# Patient Record
Sex: MALE | Race: WHITE | Employment: OTHER | URBAN - METROPOLITAN AREA
[De-identification: names, ages, dates, MRNs, and addresses within clinical notes are randomized per-mention and may not be internally consistent; named-entity substitution may affect disease eponyms.]

---

## 2017-03-29 ENCOUNTER — OFFICE VISIT (OUTPATIENT)
Dept: HEMATOLOGY | Age: 57
End: 2017-03-29

## 2017-03-29 VITALS
HEART RATE: 79 BPM | HEIGHT: 77 IN | BODY MASS INDEX: 37.19 KG/M2 | TEMPERATURE: 99.5 F | SYSTOLIC BLOOD PRESSURE: 147 MMHG | RESPIRATION RATE: 18 BRPM | WEIGHT: 315 LBS | OXYGEN SATURATION: 99 % | DIASTOLIC BLOOD PRESSURE: 83 MMHG

## 2017-03-29 DIAGNOSIS — K75.81 NASH (NONALCOHOLIC STEATOHEPATITIS): Primary | ICD-10-CM

## 2017-03-29 NOTE — PROGRESS NOTES
93 Cecily Altman MD, KAMI Israel PA-C Wanita Parisian, MD, MD Le Sen NP Roanne Bar, NP        [address], [address] [facility], [address]     [phone]     FAX: 651 [facility], [address] [place] [address], [address] [facility] [address]     [phone]     FAX: [phone]       Patient Care Team:  Liv Lopez MD as PCP - General (Internal Medicine)  Grecia Winslow MD (General Surgery)      Problem List  Date Reviewed: 12/31/2016          Codes Class Noted    H/O laparoscopic adjustable gastric banding ICD-10-CM: Z98.84  ICD-9-CM: V45.86  8/10/2016        H/O lumbosacral spine surgery ICD-10-CM: Z98.890  ICD-9-CM: V15.29  8/10/2016        S/P appendectomy ICD-10-CM: Z90.49  ICD-9-CM: V45.89  8/10/2016        SHAW (nonalcoholic steatohepatitis) ICD-10-CM: K75.81  ICD-9-CM: 571.8  11/7/2013        History of atrial fibrillation ICD-10-CM: Z86.79  ICD-9-CM: V12.59  Unknown    Overview Signed 8/5/2013  9:52 AM by NORMAN Cobian     cardioverted / no recurrance             Depression ICD-10-CM: F32.9  ICD-9-CM: 311  Unknown        Atrial fibrillation ([facility] ICD-10-CM: I48.91  ICD-9-CM: 427.31  11/19/2012    Overview Signed 8/10/2016 12:06 PM by Shaunna Wright MD     In [address],C with medication             Morbid obesity ([facility] ICD-10-CM: E66.01  ICD-9-CM: 278.01  11/19/2012        High triglycerides ICD-10-CM: E78.1  ICD-9-CM: 272.1  8/1/2012        Sleep apnea ICD-10-CM: G47.30  ICD-9-CM: 780.57  8/1/2012    Overview Signed 8/1/2012 11:21 AM by Nandini Benavides     c-pap machine               Chronic back pain ICD-10-CM: M54.9, G89.29  ICD-9-CM: 724.5, 338.29  8/1/2012        Hypertension ICD-10-CM: Z31  ICD-9-CM: 401.9  Unknown              Romario Mckinley returns to the [facility] for management of fatty liver. The active problem list, all pertinent past medical history, medications,   liver histology, radiologic findings and laboratory findings related to the liver disorder were reviewed with the patient. The patient is a 56 y.o.  male who was found to have fatty liver disease on liver biopsy at the time of gastric banding in 10/2013. The baseline weight was 360 pounds and this declined to 320 pounds. He then gained to his current weight. Serologic evaluation for causes of chronic liver disease was negative. Imaging of the liver was ordered at the last office visit but e never scheduled this. A liver biopsy was performed in 10/2013 at the time of obesity surgery. This demonstrated SHAW with bridging fibrosis. The most recent laboratory studies indicate that the liver transaminases are elevated, ALP is normal, tests of hepatic synthetic and metabolic function are normal, and the platelet count is normal.    The patient has no symptoms which could be attributed to the liver disorder. The patient completes all daily activities without any functional limitations. The patient has not experienced fatigue, pain in the right side over the liver, problems concentrating, swelling of the abdomen, swelling of the lower extremities, hematemesis, hematochezia. ALLERGIES  No Known Allergies    MEDICATIONS  Current Outpatient Prescriptions   Medication Sig    METFORMIN HCL (METFORMIN PO) Take  by mouth.  TESTOSTERONE (FORTESTA TD) by TransDERmal route.  DOFETILIDE (TIKOSYN PO) Take  by mouth.  multivitamin (ONE A DAY) tablet Take 1 Tab by mouth daily.  cetirizine (ZYRTEC) 10 mg tablet Take  by mouth daily.  buPROPion SR (WELLBUTRIN SR) 150 mg SR tablet Take 300 mg by mouth two (2) times a day. 300 XR    zolpidem (AMBIEN) 10 mg tablet Take 10 mg by mouth nightly as needed.     venlafaxine 225 mg TR24 Take 225 mg by mouth daily. No current facility-administered medications for this visit. SYSTEM REVIEW NOT RELATED TO LIVER DISEASE OR REVIEWED ABOVE:  Constitution systems: Negative for fever, chills, weight gain, weight loss. Eyes: Negative for visual changes. ENT: Negative for sore throat, painful swallowing. Respiratory: Negative for cough, hemoptysis, SOB. Cardiology: Negative for chest pain, palpitations. GI:  Negative for constipation or diarrhea. : Negative for urinary frequency, dysuria, hematuria, nocturia. Skin: Negative for rash. Hematology: Negative for easy bruising, blood clots. Musculo-skelatal: Negative for back pain, muscle pain, weakness. Neurologic: Negative for headaches, dizziness, vertigo, memory problems not related to HE. Psychology: Negative for anxiety, depression. FAMILY HISTORY:  The father  of MI. The mother is alive and healthy. There is no family history of liver disease. SOCIAL HISTORY:  The patient is . The patient has 3 children,   The patient has never used tobacco products. The patient had consumed 5-6 alcoholic beverages per day. Sicne my initial appointment with me in 2016 he now only consumes 0-2 alcoholic beverages per day. The patient used to work as a . The patient retired in . PHYSICAL EXAMINATION:  Visit Vitals    /83 (BP 1 Location: Left arm, BP Patient Position: Sitting)    Pulse 79    Temp 99.5 °F (37.5 °C) (Tympanic)    Resp 18    Ht 6' 5\" (1.956 m)    Wt 331 lb 3.2 oz (150.2 kg)    SpO2 99%    BMI 39.27 kg/m2     General: No acute distress. Eyes: Sclera anicteric. ENT: No oral lesions. Thyroid normal.  Nodes: No adenopathy. Skin: No spider angiomata. No jaundice. No palmar erythema. Respiratory: Lungs clear to auscultation. Cardiovascular: Regular heart rate. No murmurs. No JVD. Abdomen: Soft non-tender. Liver size normal to percussion/palpation.   Spleen not palpable. No obvious ascites. Extremities: No edema. No muscle wasting. No gross arthritic changes. Neurologic: Alert and oriented. Cranial nerves grossly intact. No asterixis. LABORATORY STUDIES:  [facility] Ref Rng 8/10/2016 9/30/2013   WBC 4.6 - 13.2 K/uL 6.0 5.5   ANC 1.8 - 8.0 K/UL 2.6 2.2   HGB 13.0 - 16.0 g/dL 13.6 16.6 (H)    - 420 K/uL 241 173   INR 0.8 - 1.2   1.2    AST 15 - 37 U/L 91 (H) 96 (H)   ALT 16 - 61 U/L 170 (H) 186 (H)   Alk Phos 45 - 117 U/L 69 92   Bili, Total 0.2 - 1.0 MG/DL 1.0 1.3 (H)   Bili, Direct 0.0 - 0.2 MG/DL 0.2    Albumin 3.4 - 5.0 g/dL 4.1 3.8   BUN 7.0 - 18 MG/DL 11 14   Creat 0.6 - 1.3 MG/DL 1.07 1.12   Na 136 - 145 mmol/L 140 141   K 3.5 - 5.5 mmol/L 4.1 4.1   Cl 100 - 108 mmol/L 103 105   CO2 21 - 32 mmol/L 28 28   Glucose 74 - 99 mg/dL 97 108 (H)     SEROLOGIES:  Serologies Latest Ref Rng 8/10/2016   Hep A Ab, Total NEGATIVE   NEGATIVE   Hep B Surface Ag <1.00 Index <0.10   Hep B Surface Ag Interp NEG   NEGATIVE   Hep B Core Ab, Total NEGATIVE   NEGATIVE   Hep B Surface Ab >10.0 mIU/mL 223.44   Hep B Surface Ab Interp POS   POSITIVE   Hep C Ab 0.0 - 0.9 s/co ratio <0.1   Ferritin 8 - 388 NG/ML 17   Iron % Saturation % 8   C-ANCA Neg:<1:20 titer <1:20   P-ANCA Neg:<1:20 titer <1:20   ANCA Neg:<1:20 titer <1:20   ASMCA 0 - 19 Units 15   M2 Ab 0.0 - 20.0 Units 9.0   Alpha-1 antitrypsin level 90 - 200 mg/dL 140     LIVER HISTOLOGY:  10/2013. SHAW. 40% macro and micovesicular steatosis. Moderate ballooning. Moderate inflammation. Bridging fibrosis. Biopsy slides not reviewed by MLS. ENDOSCOPIC PROCEDURES:  Not available or performed    RADIOLOGY:  Not available or performed    OTHER TESTING:  Not available or performed    ASSESSMENT AND PLAN:  Steatohepatitis with bridging fibrosis by liver biopsy in 2013 at the time of obesity surgery.       This may have been caused by a combination of alcohol and non-alcoholic fatty liver at that time.  It is uncler how much alcohol he was consuming at the time of the biopsy which was 3 years before I saw him. When I did see him initially last year he was consuming too much alcohol. Since then he has reduced alcohol to a \"normal\" amount. He has not consumed excessive amounts of alcohol for more than 6 months. Some weight loss following the adjustable gastric banding procedure in 2013. The weight has since increased. Liver transaminases are elevated. Alkaline phosphate is normal.  Liver function is normal.  The platelet count is normal.      Will perform imaging of the liver with ultrasound. The need to perform an assessment of liver fibrosis was discussed with the patient. The Fibroscan can assess liver fibrosis and determine if a patient has advanced fibrosis or cirrhosis without the need for liver biopsy. The Fibroscan is currently available at liver [place]. This will be scheduled. Will retrieve the liver biopsy slides from pathology for my personal review. The patient remains severely obese despite the adjustable gastric banding. His weight has increased and since remained stable the brittany a few months after the procedure which was now 3 yeas ago. The patient was counseled regarding diet and exercise to achieve weight loss. The best diet for patients with fatty liver is one very low in carbohydrates and enriched with protein such as an Atkins program.      The patient may be able to be a candidate for enrollment in a clinical trial for treatment of SHAW despite the gastric banding. The patient was counseled regarding alcohol consumption and that this could contribute to fatty liver disease. Vaccination for viral hepatitis A is recommended since the patient has no serologic evidence of previous exposure or vaccination with immunity. Vaccination for viral hepatitis B is not needed.   The patient has serologic evidence of prior exposure or vaccination with immunity. All of the above issues were discussed with the patient. All questions were answered. The patient expressed a clear understanding of the above. [facility] in 4 months. We will contact the patient soner if he is able to enroll into a clinical trial for treatment of SHAW.     Diogenes Pedraza MD  [facility] [place] [address], [address] [place] [address] [facility], [address] [place] [address] Street - [address]  [phone]

## 2017-03-29 NOTE — MR AVS SNAPSHOT
Visit Information Date & Time Provider Department Dept. Phone Encounter #  
 3/29/2017 11:00 AM Ramón Tavarez MD [facility] [address] [id number] Upcoming Health Maintenance Date Due DTaP/Tdap/Td series (1 - Tdap) 2/21/1983 FOBT Q 1 YEAR AGE 50-75 2/21/2012 INFLUENZA AGE 9 TO ADULT 8/1/2016 Allergies as of 3/29/2017  Review Complete On: 3/29/2017 By: Hernandez Yarbrough No Known Allergies Current Immunizations  Never Reviewed No immunizations on file. Not reviewed this visit Vitals BP Pulse Temp Resp Height(growth percentile) 147/83 (BP 1 Location: Left arm, BP Patient Position: Sitting) 79 99.5 °F (37.5 °C) (Tympanic) 18 6' 5\" (1.956 m) Weight(growth percentile) SpO2 BMI Smoking Status 331 lb 3.2 oz (150.2 kg) 99% 39.27 kg/m2 Never Smoker BMI and BSA Data Body Mass Index Body Surface Area  
 39.27 kg/m 2 2.86 m 2 Preferred Pharmacy Pharmacy Name Phone [facility] [address] [place] [phone] Your Updated Medication List  
  
   
This list is accurate as of: 3/29/17 11:51 AM.  Always use your most recent med list.  
  
  
  
  
 AMBIEN 10 mg tablet Generic drug:  zolpidem Take 10 mg by mouth nightly as needed. buPROPion  mg SR tablet Commonly known as:  Maria Elena Cera Take 300 mg by mouth two (2) times a day. 300 XR FORTESTA TD  
by TransDERmal route. METFORMIN PO Take  by mouth.  
  
 multivitamin tablet Commonly known as:  ONE A DAY Take 1 Tab by mouth daily. TIKOSYN PO Take  by mouth. venlafaxine 225 mg Tr24 Take 225 mg by mouth daily. ZyrTEC 10 mg tablet Generic drug:  cetirizine Take  by mouth daily. Introducing [facility]!    
 Awilda Moore introduces [employer] patient portal. Now you can access parts of your medical record, email your doctor's office, and request medication refills online. 1. In your internet browser, go to [email or web address] 2. Click on the First Time User? Click Here link in the Sign In box. You will see the New Member Sign Up page. 3. Enter your "[employer] Access Code exactly as it appears below. You will not need to use this code after youve completed the sign-up process. If you do not sign up before the expiration date, you must request a new code. · "[employer] Access Code: [email or web address] Expires: 6/27/2017 11:51 AM 
 
4. Enter the last four digits of your Social Security Number (xxxx) and Date of Birth (mm/dd/yyyy) as indicated and click Submit. You will be taken to the next sign-up page. 5. Create a "[employer] ID. This will be your "[employer] login ID and cannot be changed, so think of one that is secure and easy to remember. 6. Create a "[employer] password. You can change your password at any time. 7. Enter your Password Reset Question and Answer. This can be used at a later time if you forget your password. 8. Enter your e-mail address. You will receive e-mail notification when new information is available in [address] 9. Click Sign Up. You can now view and download portions of your medical record. 10. Click the Download Summary menu link to download a portable copy of your medical information. If you have questions, please visit the Frequently Asked Questions section of the "[employer] website. Remember, "[employer] is NOT to be used for urgent needs. For medical emergencies, dial 911. Now available from your iPhone and Android! Please provide this summary of care documentation to your next provider. Your primary care clinician is listed as [address] [place] [address] Street. If you have any questions after today's visit, please call [phone].

## 2017-06-07 ENCOUNTER — APPOINTMENT (OUTPATIENT)
Dept: GENERAL RADIOLOGY | Age: 57
End: 2017-06-07
Attending: SPECIALIST
Payer: OTHER GOVERNMENT

## 2017-06-07 ENCOUNTER — HOSPITAL ENCOUNTER (OUTPATIENT)
Age: 57
Setting detail: OUTPATIENT SURGERY
Discharge: HOME | End: 2017-06-07
Attending: SPECIALIST | Admitting: SPECIALIST
Payer: OTHER GOVERNMENT

## 2017-06-07 VITALS
BODY MASS INDEX: 37.19 KG/M2 | HEIGHT: 77 IN | DIASTOLIC BLOOD PRESSURE: 92 MMHG | WEIGHT: 315 LBS | TEMPERATURE: 98.6 F | OXYGEN SATURATION: 96 % | RESPIRATION RATE: 18 BRPM | SYSTOLIC BLOOD PRESSURE: 154 MMHG | HEART RATE: 71 BPM

## 2017-06-07 DIAGNOSIS — Z46.51 FITTING AND ADJUSTMENT OF GASTRIC LAP BAND: ICD-10-CM

## 2017-06-07 PROCEDURE — 74011000255 HC RX REV CODE- 255: Performed by: SPECIALIST

## 2017-06-07 PROCEDURE — 76000 FLUOROSCOPY <1 HR PHYS/QHP: CPT

## 2017-06-07 PROCEDURE — 74011000250 HC RX REV CODE- 250: Performed by: SPECIALIST

## 2017-06-07 PROCEDURE — 43999 UNLISTED PROCEDURE STOMACH: CPT | Performed by: SPECIALIST

## 2017-06-07 RX ORDER — LIDOCAINE HYDROCHLORIDE 10 MG/ML
INJECTION INFILTRATION; PERINEURAL AS NEEDED
Status: DISCONTINUED | OUTPATIENT
Start: 2017-06-07 | End: 2017-06-07 | Stop reason: HOSPADM

## 2017-06-07 NOTE — IP AVS SNAPSHOT
Tessa Wilson 
 
 
 [address] [address] 
[phone] Patient: Aracelis De Leon MRN: [id number] WYO:0[birth year]86[birth year]93 You are allergic to the following No active allergies Recent Documentation Height Weight BMI Smoking Status 1.956 m 154 kg 40.26 kg/m2 Never Smoker Emergency Contacts Name Discharge Info Relation Home Work Mobile Neftaly Carrasco  Spouse [3] [phone] Amanda Carrasco  Spouse [3] [phone] About your hospitalization You were admitted on:  June 7, 2017 You last received care in the:  [facility] ENDOSCOPY You were discharged on:  June 7, 2017 Unit phone number:  [phone] Why you were hospitalized Your primary diagnosis was:  Not on File Providers Seen During Your Hospitalizations Provider Role Specialty Primary office phone Flori Dallas MD Attending Provider General Surgery [phone] Your Primary Care Physician (PCP) Primary Care Physician Office Phone Office Fax Isamar Moffett [phone] Follow-up Information None Your Appointments Wednesday June 07, 2017 GASTRIC BAND ADJUSTMENT with Flori Dallas MD  
[facility] ENDOSCOPY [facility] [place]) [address] [address]  
[phone] Current Discharge Medication List  
  
ASK your doctor about these medications Dose & Instructions Dispensing Information Comments Morning Noon Evening Bedtime AMBIEN 10 mg tablet Generic drug:  zolpidem Your last dose was: Your next dose is:    
   
   
 Dose:  10 mg Take 10 mg by mouth nightly as needed. Refills:  0  
     
   
   
   
  
 buPROPion  mg SR tablet Commonly known as:  Earma Landau Your last dose was:     
   
Your next dose is:    
   
   
 Dose:  300 mg  
 Take 300 mg by mouth two (2) times a day. 300 XR Refills:  0 FORTESTA TD Your last dose was: Your next dose is:    
   
   
 by TransDERmal route. Refills:  0 METFORMIN PO Your last dose was: Your next dose is: Take  by mouth. Refills:  0  
     
   
   
   
  
 multivitamin tablet Commonly known as:  ONE A DAY Your last dose was: Your next dose is:    
   
   
 Dose:  1 Tab Take 1 Tab by mouth daily. Refills:  0 TIKOSYN PO Your last dose was: Your next dose is: Take  by mouth. Refills:  0  
     
   
   
   
  
 venlafaxine 225 mg Tr24 Your last dose was: Your next dose is:    
   
   
 Dose:  225 mg Take 225 mg by mouth daily. Refills:  0 ZyrTEC 10 mg tablet Generic drug:  cetirizine Your last dose was: Your next dose is: Take  by mouth daily. Refills:  0 Discharge Instructions Verbal and written post adjustment / UGI instructions given. Patient acknowledges understanding. Discussed diet, activities, and s/s that should be reported. Encouraged to call to schedule next appointment and to call with any questions or concerns. Discharge Orders None Introducing [facility]! Catalina Giraldo introduces [employer] patient portal. Now you can access parts of your medical record, email your doctor's office, and request medication refills online. 1. In your internet browser, go to [email or web address] 2. Click on the First Time User? Click Here link in the Sign In box. You will see the New Member Sign Up page. 3. Enter your [employer] Access Code exactly as it appears below. You will not need to use this code after youve completed the sign-up process.  If you do not sign up before the expiration date, you must request a new code. · [employer] Access Code: [email or web address] Expires: 6/27/2017 11:51 AM 
 
4. Enter the last four digits of your Social Security Number (xxxx) and Date of Birth (mm/dd/yyyy) as indicated and click Submit. You will be taken to the next sign-up page. 5. Create a [employer] ID. This will be your [employer] login ID and cannot be changed, so think of one that is secure and easy to remember. 6. Create a [employer] password. You can change your password at any time. 7. Enter your Password Reset Question and Answer. This can be used at a later time if you forget your password. 8. Enter your e-mail address. You will receive e-mail notification when new information is available in [address] 9. Click Sign Up. You can now view and download portions of your medical record. 10. Click the Download Summary menu link to download a portable copy of your medical information. If you have questions, please visit the Frequently Asked Questions section of the [employer] website. Remember, [employer] is NOT to be used for urgent needs. For medical emergencies, dial 911. Now available from your iPhone and Android! General Information Please provide this summary of care documentation to your next provider. Patient Signature:  ____________________________________________________________ Date:  ____________________________________________________________  
  
Nadine Pedraza Provider Signature:  ____________________________________________________________ Date:  ____________________________________________________________

## 2017-06-07 NOTE — IP AVS SNAPSHOT
Current Discharge Medication List  
  
ASK your doctor about these medications Dose & Instructions Dispensing Information Comments Morning Noon Evening Bedtime AMBIEN 10 mg tablet Generic drug:  zolpidem Your last dose was: Your next dose is:    
   
   
 Dose:  10 mg Take 10 mg by mouth nightly as needed. Refills:  0  
     
   
   
   
  
 buPROPion  mg SR tablet Commonly known as:   Asp Your last dose was: Your next dose is:    
   
   
 Dose:  300 mg Take 300 mg by mouth two (2) times a day. 300 XR Refills:  0 FORTESTA TD Your last dose was: Your next dose is:    
   
   
 by TransDERmal route. Refills:  0 METFORMIN PO Your last dose was: Your next dose is: Take  by mouth. Refills:  0  
     
   
   
   
  
 multivitamin tablet Commonly known as:  ONE A DAY Your last dose was: Your next dose is:    
   
   
 Dose:  1 Tab Take 1 Tab by mouth daily. Refills:  0 TIKOSYN PO Your last dose was: Your next dose is: Take  by mouth. Refills:  0  
     
   
   
   
  
 venlafaxine 225 mg Tr24 Your last dose was: Your next dose is:    
   
   
 Dose:  225 mg Take 225 mg by mouth daily. Refills:  0 ZyrTEC 10 mg tablet Generic drug:  cetirizine Your last dose was: Your next dose is: Take  by mouth daily. Refills:  0

## 2017-06-07 NOTE — PROCEDURES
Lap Band Encounter (fluroscopy clinic)    Blair Franco is gastric banding patient who had his procedure on 09/30/13. his weight today is 154 kg (339 lb 8 oz), which correlates to  % EBW loss. he is here today for Lap Band Adjustment / Fill with Fluoroscopy Guidance. he notes the following issues related to the banding procedure; -  Here lost to F/U X 3.25 years with dysphagia and reflux issues.       Surgery related complications; NA    Visit Vitals    BP (!) 154/92    Pulse 71    Temp 98.6 °F (37 °C)    Resp 18    Ht 6' 5\" (1.956 m)    Wt 154 kg (339 lb 8 oz)    SpO2 96%    BMI 40.26 kg/m2       Past Medical History:   Diagnosis Date    Arrhythmia     h/o afib    Depression     Essential hypertension, benign 8/1/2012    High triglycerides 8/1/2012    History of atrial fibrillation     cardioverted / no recurrance    Hypercholesterolemia     PT denies this at triage 11/19/12    Hypertension 2012    Morbid [facility] ([facility]     Nausea & vomiting     Other ill-defined conditions(799.89)     concussions during football games through [employer]    Other ill-defined conditions(799.89)     numbness down right leg    Psychiatric disorder     depression/anxiety    Sleep apnea     instructed to bring CPAP day of Surgery; rate 12     Past Surgical History:   Procedure Laterality Date    CARDIAC CATHETERIZATION  1/2013    HX APPENDECTOMY      HX HEENT      UPP    HX ORTHOPAEDIC      L1-S5 diskectomy    LAP, PLACE ADJUST GASTR BAND       Current Facility-Administered Medications   Medication Dose Route Frequency Provider Last Rate Last Dose    barium sulfate (EZ PAQUE) 96% (w/w) contrast suspension    PRN Linda Reyna MD   30 mL at 06/07/17 1351    lidocaine (XYLOCAINE) 10 mg/mL (1 %) injection    PRMAMTA Reyna MD   1.5 mL at 06/07/17 1351          Review of Symptoms:     General - No history or complaints of unexpected fever or chills  Cardiac - No history or complaints of chest pain, palpitations, or shortness of breath  Pulmonary - No history or complaints of shortness of breath or productive cough  Gastrointestinal - as noted above        Physical Exam:    General:  alert, cooperative, no distress, appears stated age   Abdomen:   abdomen is soft without significant tenderness, masses, organomegaly or guarding; port in place   Incisions: healing well, no significant drainage       Assessment:     1. History of Morbid obesity, status post gastric banding, given the fluro findings of esophageal dilation and moderate / severe reflux we will proceed with the following adjustment.       Plan:     Previous Fill Volume: 8.7 ml   Removed: 1 ml    Total fill volume after today's adjustment: 7.7  Added:           plan for possible band to sleeve conversion due to reflux       Follow-up in PRN for possible conversion to sleeve

## 2017-06-26 ENCOUNTER — CLINICAL SUPPORT (OUTPATIENT)
Dept: SURGERY | Age: 57
End: 2017-06-26

## 2017-06-26 VITALS — WEIGHT: 315 LBS | HEIGHT: 77 IN | BODY MASS INDEX: 37.19 KG/M2

## 2017-06-26 DIAGNOSIS — E66.01 MORBID OBESITY WITH BMI OF 40.0-44.9, ADULT (HCC): Primary | ICD-10-CM

## 2017-06-26 NOTE — PROGRESS NOTES
Medical Weight Loss Multi-Disciplinary Program    Name: Cyril Bryant   : 1960    Session# 1  Date: 2017     Height: 6' 5\" (195.6 cm)    Weight: 156.5 kg (345 lb) lbs. Body mass index is 40.91 kg/(m^2). Dietary Instructions    Reviewed intake  Understanding low carbohydrates, low sugar, higher protein meals  Understanding proper portions  Instruction given for personal dietary changes  Comments: Pt given brief pre/post-op diet ed and diet hx reviewed. Physical Activity/Exercise    Discussed Perceived Compliance  Reasonable Goals Set  Motivation  Comments: Pt is active in daily life but has no formal routine. Goal to walk daily in the morning- about 7 am- for about 30 minutes starting out- in his neighborhood. Do more yardwork. Behavior Modification    Positive attitude  Comments: Pt is working on the following goals:    1. Walk daily in the morning- about 7 am- for about 30 minutes starting out- in his neighborhood. Do more yardwork. 2. Stop buying less healthy food that you like. Stock up on healthy protein snacks- use shopping list to help with this. 3. Continue all vitamins prior to surgery. Stop krill oil 10 days prior and resume 1 month after. Candidate for surgery (per RD): pending    Dietitian: Klaudia Rodríguez RD     Cyril Bryant is a 56 y.o. male who present for a pre-op evaluation.     Visit Vitals    Ht 6' 5\" (1.956 m)    Wt 156.5 kg (345 lb)    BMI 40.91 kg/m2     Past Medical History:   Diagnosis Date    Arrhythmia     h/o afib    Depression     Essential hypertension, benign 2012    High triglycerides 2012    History of atrial fibrillation     cardioverted / no recurrance    Hypercholesterolemia     PT denies this at triage 12    Hypertension 2012    Morbid obesity ([facility] [address]     Nausea & vomiting     Other ill-defined conditions(799.89)     concussions during football games through college    Other ill-defined conditions(799.89) numbness down right leg    Psychiatric disorder     depression/anxiety    Sleep apnea     instructed to bring CPAP day of Surgery; rate 12      Procedure:  Revision gastric band to gastric sleeve procedure      Reasons for Surgery:  BMI > 40 with one or more medically significant comorbidities    Summary:  Pt given brief pre/post-op diet ed and diet hx reviewed. Pt set several goals. See below. Current Vitamins: krill oil, multivitamin, cinnamon, chromium, Vitamin D, electrolyte supplement including potassium     What have you done in the past to try to lose weight? Tons of exercise, diet- low carbohydrate, decreased calories, hypnosis, band    Why didn't you lose weight or keep the weight off?: Could not keep up the changes, hit a plateau with the band     Why do you think having weight loss surgery will make it possible for you to lose weight and keep it off? Because of the metabolic change with sleeve, hunger not changed with the band,       Patient Education and Materials Provided:  Supplement [facility], B Vitamin Information, MVI Recommendations, Calcium Citrate Information, Bariatric Supplement Companies, Protein Supplement Information, Fluid Requirements, No Caffeine or Carbonation, No Alcohol for One Year Post Op, 3 Balanced Meals a Day, Food Group Guide, Exercising and Addressed Current Habits / Changes to make    Nutritional Hx: What is the number of meals you eat per day? 3    Do you eat between meals / snack? yes  Typical snack: baked goods, salami, cheese, nuts, peanuts, cashews, no chips, pork rinds     How fast do you eat your meals? Slowly than used too, but still eats quick     How many sodas/sugared beverages do you drink per day? none    How many caffeinated drinks do you have per day? Tea for a while- but none now because does not like diet tea     How much water do you drink per day? 64 ounces+     How often do you eat fast food?  Chic meena a daily- scrambled eggs, sausage, and cheese How often do you consume alcohol? 2-3 drinks per day     Diet History:  Breakfast  What are you eating and how much? [place] turnover- at 6:30 am, Cheese and mushroom omelette, 3 slices of juares   When? 10 am (woke up at 6 am)   Where? iii   Snacks  What are you eating and how much? i   When? ii   Where? iii   Hydration  What are you eating and how much? i   When? ii   Where? ii   Lunch  What are you eating and how much? Snacks throughout the afternoon- peanuts, pork rinds, a couple baked goods like strudle strips at [employer]    When? ii   Where? iii   Snacks  What are you eating and how much? i   When? ii   Where? iii   Hydration  What are you eating and how much? Brooks water and diet Pepsi    When? ii   Where? iii   Dinner  What are you eating and how much? 6 oz of smoked tenderloin and a cup and a half of juares, green, beans, and onions    When? 6 pm   Where? iii   Snacks  What are you eating and how much? Macadamia white chocolate cookies- large cookie   When? 8 pm   Where? iii   Hydration  What are you eating and how much? Rum and diet coke    When? ii   Where? iii     Exercise:  Do you currently have an exercise routine? no, active in daily life but no routine     Goals:   1. Walk daily in the morning- about 7 am- for about 30 minutes starting out- in his neighborhood. Do more yardwork. 2. Stop buying less healthy food that you like. Stock up on healthy protein snacks- use shopping list to help with this. 3. Continue all vitamins prior to surgery. Stop krill oil 10 days prior and resume 1 month after.

## 2017-06-26 NOTE — LETTER
NOTIFICATION OF RETURN TO WORK / SCHOOL 
 
6/26/2017 Mr. Saloni Garza [place] [address] [address] [facility] [address] To Whom It May Concern: 
 
Saloni Garza was seen in our office today 06/26/2017. He will return to {work/school:20096} on 06/27/2017 with no restrictions. Patient also has a future apt schedule for 07/26/2017 at 1:00PM. If there are questions or concerns please contact our office.  
 
 
 
Sincerely, 
 
 
TSS NUTRI VISIT PEN

## 2017-06-26 NOTE — PATIENT INSTRUCTIONS
Goals: 1. Walk daily in the morning- about 7 am- for about 30 minutes starting out- in his neighborhood. Do more yardwork. 2. Stop buying less healthy food that you like. Stock up on healthy protein snacks- use shopping list to help with this. 3. Continue all vitamins prior to surgery. Stop krill oil 10 days prior and resume 1 month after.

## 2017-06-26 NOTE — MR AVS SNAPSHOT
Visit Information Date & Time Provider Department Dept. Phone Encounter #  
 6/26/2017  1:00 PM [facility] Lamonte Leyva [phone] [id number] Upcoming Health Maintenance Date Due DTaP/Tdap/Td series (1 - Tdap) 2/21/1983 FOBT Q 1 YEAR AGE 50-75 2/21/2012 INFLUENZA AGE 9 TO ADULT 8/1/2017 Allergies as of 6/26/2017  Review Complete On: 6/7/2017 By: Marcia Herrera No Known Allergies Current Immunizations  Never Reviewed No immunizations on file. Not reviewed this visit Vitals Height(growth percentile) Weight(growth percentile) BMI Smoking Status 6' 5\" (1.956 m) 345 lb (156.5 kg) 40.91 kg/m2 Never Smoker BMI and BSA Data Body Mass Index Body Surface Area 40.91 kg/m 2 2.92 m 2 Preferred Pharmacy Pharmacy Name Phone [facility] [address] [place] [phone] Your Updated Medication List  
  
   
This list is accurate as of: 6/26/17  1:27 PM.  Always use your most recent med list.  
  
  
  
  
 AMBIEN 10 mg tablet Generic drug:  zolpidem Take 10 mg by mouth nightly as needed. buPROPion  mg SR tablet Commonly known as:  [place] Manuel Take 300 mg by mouth two (2) times a day. 300 XR FORTESTA TD  
by TransDERmal route. METFORMIN PO Take  by mouth.  
  
 multivitamin tablet Commonly known as:  ONE A DAY Take 1 Tab by mouth daily. TIKOSYN PO Take  by mouth. venlafaxine 225 mg Tr24 Take 225 mg by mouth daily. ZyrTEC 10 mg tablet Generic drug:  cetirizine Take  by mouth daily. Patient Instructions Goals:  
1. Walk daily in the morning- about 7 am- for about 30 minutes starting out- in his neighborhood. Do more yardwork. 2. Stop buying less healthy food that you like. Stock up on healthy protein snacks- use shopping list to help with this. 3. Continue all vitamins prior to surgery. Stop krill oil 10 days prior and resume 1 month after. Introducing [facility]! Tyler Swanson introduces [employer] patient portal. Now you can access parts of your medical record, email your doctor's office, and request medication refills online. 1. In your internet browser, go to [email or web address] 2. Click on the First Time User? Click Here link in the Sign In box. You will see the New Member Sign Up page. 3. Enter your [employer] Access Code exactly as it appears below. You will not need to use this code after youve completed the sign-up process. If you do not sign up before the expiration date, you must request a new code. · [employer] Access Code: [email or web address] Expires: 6/27/2017 11:51 AM 
 
4. Enter the last four digits of your Social Security Number (xxxx) and Date of Birth (mm/dd/yyyy) as indicated and click Submit. You will be taken to the next sign-up page. 5. Create a [employer] ID. This will be your [employer] login ID and cannot be changed, so think of one that is secure and easy to remember. 6. Create a [employer] password. You can change your password at any time. 7. Enter your Password Reset Question and Answer. This can be used at a later time if you forget your password. 8. Enter your e-mail address. You will receive e-mail notification when new information is available in [address]. 9. Click Sign Up. You can now view and download portions of your medical record. 10. Click the Download Summary menu link to download a portable copy of your medical information. If you have questions, please visit the Frequently Asked Questions section of the [employer] website. Remember, [employer] is NOT to be used for urgent needs. For medical emergencies, dial 911. Now available from your iPhone and Android! Please provide this summary of care documentation to your next provider. Your primary care clinician is listed as [address] [place] [address]. If you have any questions after today's visit, please call [phone].

## 2017-07-27 ENCOUNTER — OFFICE VISIT (OUTPATIENT)
Dept: SURGERY | Age: 57
End: 2017-07-27

## 2017-07-27 VITALS — BODY MASS INDEX: 37.19 KG/M2 | WEIGHT: 315 LBS | HEIGHT: 77 IN

## 2017-07-27 DIAGNOSIS — E66.01 MORBID OBESITY WITH BMI OF 40.0-44.9, ADULT (HCC): Primary | ICD-10-CM

## 2017-07-27 NOTE — PROGRESS NOTES
Medical Weight Loss Multi-Disciplinary Program    Name: Treasure Stewatr   : 1960    Session# 2  Date: 2017     Height: 6' 5\" (195.6 cm)    Weight: (!) 159.7 kg (352 lb) lbs. Body mass index is 41.74 kg/(m^2). Pounds Gained: 7  Dietary Instructions    Reviewed intake  Understanding low carbohydrates, low sugar, higher protein meals  Understanding proper portions  Instruction given for personal dietary changes  Discussed perceived compliance  Comments: Diet hx reviewed and personal dietary changes discussed. Pt received a healthy meal planning diet ed. Physical Activity/Exercise    Discussed Perceived Compliance  Reasonable Goals Set  Motivation  Comments: Pt is walking for 30 minutes, 3 times per week. Plans to increase activity by doing more yard work. He is considering a [employer] membership. Behavior Modification    Achieving/Rewarding goals met  Positive attitude  Discussed perceived compliance  Comments: Pt has been exercising and plans to increase. He has decreased snacking, decreased portions (using a smaller plate), and is drinking Premier protein shake 3 days per week.  He continues to work to decrease portions, choose protein snacks only up to twice daily, and make better dessert choices such as portioned dark chocolate covered bananas and Halotop ice cream.     Candidate for surgery (per RD): pending    Dietitian: Donya Yanez RD

## 2017-08-17 ENCOUNTER — CLINICAL SUPPORT (OUTPATIENT)
Dept: SURGERY | Age: 57
End: 2017-08-17

## 2017-08-17 VITALS — WEIGHT: 315 LBS | HEIGHT: 77 IN | BODY MASS INDEX: 37.19 KG/M2

## 2017-08-17 DIAGNOSIS — E66.01 MORBID OBESITY WITH BMI OF 40.0-44.9, ADULT (HCC): Primary | ICD-10-CM

## 2017-08-17 NOTE — PROGRESS NOTES
Medical Weight Loss Multi-Disciplinary Program    Name: Saranya Chadwick   : 1960    Session# 3  Date: 2017     Height: 6' 5\" (195.6 cm)    Weight: (!) 161 kg (355 lb) lbs. Body mass index is 42.1 kg/(m^2). Pounds Gained: 3    Dietary Instructions    Reviewed intake  Understanding low carbohydrates, low sugar, higher protein meals  Understanding proper portions  Instruction given for personal dietary changes  Discussed perceived compliance  Comments: Diet hx reviewed and personal dietary changes discussed. Physical Activity/Exercise    Discussed Perceived Compliance  Reasonable Goals Set  Motivation  Comments: Pt has continued walking for 30 minutes 3 times per week. He intends to increase activity by exercising at the [facility] between 1-3 pm, doing elliptical and weights, for a total of an hour. 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 Pt agreed. Behavior Modification    Achieving/Rewarding goals met  Positive attitude  Discussed perceived compliance  Comments: Pt is exercising and plans to increase. He has been following a ketogenic diet for about a week. Goal to continue this eating pattern. Also to continue no alcohol intake.      Candidate for surgery (per RD): YES    Dietitian: Jose Yadav RD

## 2017-09-08 DIAGNOSIS — Z01.812 BLOOD TESTS PRIOR TO TREATMENT OR PROCEDURE: ICD-10-CM

## 2017-09-08 DIAGNOSIS — I10 ESSENTIAL HYPERTENSION: Primary | ICD-10-CM

## 2017-09-08 DIAGNOSIS — K21.9 GASTROESOPHAGEAL REFLUX DISEASE WITHOUT ESOPHAGITIS: ICD-10-CM

## 2017-09-08 DIAGNOSIS — Z98.84 BARIATRIC SURGERY STATUS: ICD-10-CM

## 2017-09-08 DIAGNOSIS — K31.0 ACUTE DILATATION OF STOMACH: ICD-10-CM

## 2017-09-18 ENCOUNTER — HOSPITAL ENCOUNTER (OUTPATIENT)
Dept: PREADMISSION TESTING | Age: 57
Discharge: HOME | End: 2017-09-18
Payer: OTHER GOVERNMENT

## 2017-09-18 DIAGNOSIS — Z98.84 BARIATRIC SURGERY STATUS: ICD-10-CM

## 2017-09-18 DIAGNOSIS — I10 ESSENTIAL HYPERTENSION: ICD-10-CM

## 2017-09-18 DIAGNOSIS — K21.9 GASTROESOPHAGEAL REFLUX DISEASE WITHOUT ESOPHAGITIS: ICD-10-CM

## 2017-09-18 DIAGNOSIS — Z01.812 BLOOD TESTS PRIOR TO TREATMENT OR PROCEDURE: ICD-10-CM

## 2017-09-18 DIAGNOSIS — K31.0 ACUTE DILATATION OF STOMACH: ICD-10-CM

## 2017-09-18 LAB
ALBUMIN SERPL-MCNC: 3.9 G/DL (ref 3.4–5)
ALBUMIN/GLOB SERPL: 1.1 {RATIO} (ref 0.8–1.7)
ALP SERPL-CCNC: 68 U/L (ref 45–117)
ALT SERPL-CCNC: 105 U/L (ref 16–61)
ANION GAP SERPL CALC-SCNC: 9 MMOL/L (ref 3–18)
AST SERPL-CCNC: 50 U/L (ref 15–37)
ATRIAL RATE: 61 BPM
BASOPHILS # BLD: 0 K/UL (ref 0–0.06)
BASOPHILS NFR BLD: 0 % (ref 0–2)
BILIRUB SERPL-MCNC: 1.8 MG/DL (ref 0.2–1)
BUN SERPL-MCNC: 14 MG/DL (ref 7–18)
BUN/CREAT SERPL: 15 (ref 12–20)
CALCIUM SERPL-MCNC: 9.3 MG/DL (ref 8.5–10.1)
CALCULATED P AXIS, ECG09: 59 DEGREES
CALCULATED R AXIS, ECG10: 57 DEGREES
CALCULATED T AXIS, ECG11: -12 DEGREES
CHLORIDE SERPL-SCNC: 105 MMOL/L (ref 100–108)
CO2 SERPL-SCNC: 26 MMOL/L (ref 21–32)
CREAT SERPL-MCNC: 0.95 MG/DL (ref 0.6–1.3)
DIAGNOSIS, 93000: NORMAL
DIFFERENTIAL METHOD BLD: ABNORMAL
EOSINOPHIL # BLD: 0.2 K/UL (ref 0–0.4)
EOSINOPHIL NFR BLD: 4 % (ref 0–5)
ERYTHROCYTE [DISTWIDTH] IN BLOOD BY AUTOMATED COUNT: 14.7 % (ref 11.6–14.5)
GLOBULIN SER CALC-MCNC: 3.4 G/DL (ref 2–4)
GLUCOSE SERPL-MCNC: 130 MG/DL (ref 74–99)
HCT VFR BLD AUTO: 44.8 % (ref 36–48)
HGB BLD-MCNC: 15.8 G/DL (ref 13–16)
LYMPHOCYTES # BLD: 1.6 K/UL (ref 0.9–3.6)
LYMPHOCYTES NFR BLD: 34 % (ref 21–52)
MCH RBC QN AUTO: 31.4 PG (ref 24–34)
MCHC RBC AUTO-ENTMCNC: 35.3 G/DL (ref 31–37)
MCV RBC AUTO: 89.1 FL (ref 74–97)
MONOCYTES # BLD: 0.5 K/UL (ref 0.05–1.2)
MONOCYTES NFR BLD: 10 % (ref 3–10)
NEUTS SEG # BLD: 2.4 K/UL (ref 1.8–8)
NEUTS SEG NFR BLD: 52 % (ref 40–73)
P-R INTERVAL, ECG05: 176 MS
PLATELET # BLD AUTO: 157 K/UL (ref 135–420)
PMV BLD AUTO: 10.2 FL (ref 9.2–11.8)
POTASSIUM SERPL-SCNC: 4 MMOL/L (ref 3.5–5.5)
PROT SERPL-MCNC: 7.3 G/DL (ref 6.4–8.2)
Q-T INTERVAL, ECG07: 416 MS
QRS DURATION, ECG06: 86 MS
QTC CALCULATION (BEZET), ECG08: 418 MS
RBC # BLD AUTO: 5.03 M/UL (ref 4.7–5.5)
SODIUM SERPL-SCNC: 140 MMOL/L (ref 136–145)
VENTRICULAR RATE, ECG03: 61 BPM
WBC # BLD AUTO: 4.6 K/UL (ref 4.6–13.2)

## 2017-09-18 PROCEDURE — 85025 COMPLETE CBC W/AUTO DIFF WBC: CPT | Performed by: SPECIALIST

## 2017-09-18 PROCEDURE — 36415 COLL VENOUS BLD VENIPUNCTURE: CPT | Performed by: SPECIALIST

## 2017-09-18 PROCEDURE — 80053 COMPREHEN METABOLIC PANEL: CPT | Performed by: SPECIALIST

## 2017-09-18 PROCEDURE — 93005 ELECTROCARDIOGRAM TRACING: CPT

## 2017-09-27 ENCOUNTER — ANESTHESIA EVENT (OUTPATIENT)
Dept: SURGERY | Age: 57
End: 2017-09-27
Payer: OTHER GOVERNMENT

## 2017-09-28 ENCOUNTER — HOSPITAL ENCOUNTER (OUTPATIENT)
Age: 57
Setting detail: OUTPATIENT SURGERY
Discharge: HOME | End: 2017-09-28
Attending: SPECIALIST | Admitting: SPECIALIST
Payer: OTHER GOVERNMENT

## 2017-09-28 ENCOUNTER — ANESTHESIA (OUTPATIENT)
Dept: SURGERY | Age: 57
End: 2017-09-28
Payer: OTHER GOVERNMENT

## 2017-09-28 VITALS
OXYGEN SATURATION: 98 % | BODY MASS INDEX: 37.19 KG/M2 | RESPIRATION RATE: 16 BRPM | HEART RATE: 68 BPM | DIASTOLIC BLOOD PRESSURE: 90 MMHG | SYSTOLIC BLOOD PRESSURE: 139 MMHG | WEIGHT: 315 LBS | TEMPERATURE: 98.1 F | HEIGHT: 77 IN

## 2017-09-28 PROCEDURE — 77030008603 HC TRCR ENDOSC EPATH J&J -C: Performed by: SPECIALIST

## 2017-09-28 PROCEDURE — 77030032490 HC SLV COMPR SCD KNE COVD -B: Performed by: SPECIALIST

## 2017-09-28 PROCEDURE — 76210000006 HC OR PH I REC 0.5 TO 1 HR: Performed by: SPECIALIST

## 2017-09-28 PROCEDURE — 77030014008 HC SPNG HEMSTAT J&J -C: Performed by: SPECIALIST

## 2017-09-28 PROCEDURE — 76060000033 HC ANESTHESIA 1 TO 1.5 HR: Performed by: SPECIALIST

## 2017-09-28 PROCEDURE — 76010000149 HC OR TIME 1 TO 1.5 HR: Performed by: SPECIALIST

## 2017-09-28 PROCEDURE — 77030018836 HC SOL IRR NACL ICUM -A: Performed by: SPECIALIST

## 2017-09-28 PROCEDURE — 77030016151 HC PROTCTR LNS DFOG COVD -B: Performed by: SPECIALIST

## 2017-09-28 PROCEDURE — 74011000250 HC RX REV CODE- 250: Performed by: SPECIALIST

## 2017-09-28 PROCEDURE — 88300 SURGICAL PATH GROSS: CPT | Performed by: SPECIALIST

## 2017-09-28 PROCEDURE — 77030009403 HC ELECTRD ENDO MEGA -B: Performed by: SPECIALIST

## 2017-09-28 PROCEDURE — 76210000020 HC REC RM PH II FIRST 0.5 HR: Performed by: SPECIALIST

## 2017-09-28 PROCEDURE — 77030002935 HC SUT MCRYL J&J -C: Performed by: SPECIALIST

## 2017-09-28 PROCEDURE — 77030008683 HC TU ET CUF COVD -A: Performed by: NURSE ANESTHETIST, CERTIFIED REGISTERED

## 2017-09-28 PROCEDURE — 77030010507 HC ADH SKN DERMBND J&J -B: Performed by: SPECIALIST

## 2017-09-28 PROCEDURE — 74011250636 HC RX REV CODE- 250/636: Performed by: SPECIALIST

## 2017-09-28 PROCEDURE — 74011250636 HC RX REV CODE- 250/636

## 2017-09-28 PROCEDURE — 77030008477 HC STYL SATN SLP COVD -A: Performed by: NURSE ANESTHETIST, CERTIFIED REGISTERED

## 2017-09-28 PROCEDURE — 77030011640 HC PAD GRND REM COVD -A: Performed by: SPECIALIST

## 2017-09-28 PROCEDURE — 77030020782 HC GWN BAIR PAWS FLX 3M -B: Performed by: SPECIALIST

## 2017-09-28 PROCEDURE — 74011000250 HC RX REV CODE- 250

## 2017-09-28 PROCEDURE — 77030006643: Performed by: NURSE ANESTHETIST, CERTIFIED REGISTERED

## 2017-09-28 RX ORDER — MAGNESIUM SULFATE 100 %
4 CRYSTALS MISCELLANEOUS AS NEEDED
Status: DISCONTINUED | OUTPATIENT
Start: 2017-09-28 | End: 2017-09-28 | Stop reason: HOSPADM

## 2017-09-28 RX ORDER — DEXTROSE 50 % IN WATER (D50W) INTRAVENOUS SYRINGE
25-50 AS NEEDED
Status: DISCONTINUED | OUTPATIENT
Start: 2017-09-28 | End: 2017-09-28 | Stop reason: HOSPADM

## 2017-09-28 RX ORDER — ACETAMINOPHEN 10 MG/ML
1000 INJECTION, SOLUTION INTRAVENOUS ONCE
Status: COMPLETED | OUTPATIENT
Start: 2017-09-28 | End: 2017-09-28

## 2017-09-28 RX ORDER — ENOXAPARIN SODIUM 100 MG/ML
40 INJECTION SUBCUTANEOUS ONCE
Status: COMPLETED | OUTPATIENT
Start: 2017-09-28 | End: 2017-09-28

## 2017-09-28 RX ORDER — OXYCODONE AND ACETAMINOPHEN 5; 325 MG/1; MG/1
1 TABLET ORAL
Qty: 30 TAB | Refills: 0 | Status: SHIPPED | OUTPATIENT
Start: 2017-09-28 | End: 2017-10-12

## 2017-09-28 RX ORDER — SODIUM CHLORIDE, SODIUM LACTATE, POTASSIUM CHLORIDE, CALCIUM CHLORIDE 600; 310; 30; 20 MG/100ML; MG/100ML; MG/100ML; MG/100ML
125 INJECTION, SOLUTION INTRAVENOUS CONTINUOUS
Status: DISCONTINUED | OUTPATIENT
Start: 2017-09-28 | End: 2017-09-28 | Stop reason: HOSPADM

## 2017-09-28 RX ORDER — FLUMAZENIL 0.1 MG/ML
0.2 INJECTION INTRAVENOUS
Status: DISCONTINUED | OUTPATIENT
Start: 2017-09-28 | End: 2017-09-28 | Stop reason: HOSPADM

## 2017-09-28 RX ORDER — AMOXICILLIN AND CLAVULANATE POTASSIUM 875; 125 MG/1; MG/1
1 TABLET, FILM COATED ORAL 2 TIMES DAILY
Qty: 20 TAB | Refills: 0 | Status: SHIPPED | OUTPATIENT
Start: 2017-09-28 | End: 2017-10-08

## 2017-09-28 RX ORDER — BUPIVACAINE HYDROCHLORIDE AND EPINEPHRINE 5; 5 MG/ML; UG/ML
INJECTION, SOLUTION EPIDURAL; INTRACAUDAL; PERINEURAL AS NEEDED
Status: DISCONTINUED | OUTPATIENT
Start: 2017-09-28 | End: 2017-09-28 | Stop reason: HOSPADM

## 2017-09-28 RX ORDER — HYDROMORPHONE HYDROCHLORIDE 2 MG/ML
0.5 INJECTION, SOLUTION INTRAMUSCULAR; INTRAVENOUS; SUBCUTANEOUS
Status: DISCONTINUED | OUTPATIENT
Start: 2017-09-28 | End: 2017-09-28 | Stop reason: HOSPADM

## 2017-09-28 RX ORDER — FENTANYL CITRATE 50 UG/ML
INJECTION, SOLUTION INTRAMUSCULAR; INTRAVENOUS AS NEEDED
Status: DISCONTINUED | OUTPATIENT
Start: 2017-09-28 | End: 2017-09-28 | Stop reason: HOSPADM

## 2017-09-28 RX ORDER — ROCURONIUM BROMIDE 10 MG/ML
INJECTION, SOLUTION INTRAVENOUS AS NEEDED
Status: DISCONTINUED | OUTPATIENT
Start: 2017-09-28 | End: 2017-09-28 | Stop reason: HOSPADM

## 2017-09-28 RX ORDER — SODIUM CHLORIDE, SODIUM LACTATE, POTASSIUM CHLORIDE, CALCIUM CHLORIDE 600; 310; 30; 20 MG/100ML; MG/100ML; MG/100ML; MG/100ML
1000 INJECTION, SOLUTION INTRAVENOUS CONTINUOUS
Status: DISCONTINUED | OUTPATIENT
Start: 2017-09-28 | End: 2017-09-28 | Stop reason: HOSPADM

## 2017-09-28 RX ORDER — MIDAZOLAM HYDROCHLORIDE 1 MG/ML
INJECTION, SOLUTION INTRAMUSCULAR; INTRAVENOUS AS NEEDED
Status: DISCONTINUED | OUTPATIENT
Start: 2017-09-28 | End: 2017-09-28 | Stop reason: HOSPADM

## 2017-09-28 RX ORDER — PROPOFOL 10 MG/ML
INJECTION, EMULSION INTRAVENOUS AS NEEDED
Status: DISCONTINUED | OUTPATIENT
Start: 2017-09-28 | End: 2017-09-28 | Stop reason: HOSPADM

## 2017-09-28 RX ORDER — NALOXONE HYDROCHLORIDE 0.4 MG/ML
0.1 INJECTION, SOLUTION INTRAMUSCULAR; INTRAVENOUS; SUBCUTANEOUS AS NEEDED
Status: DISCONTINUED | OUTPATIENT
Start: 2017-09-28 | End: 2017-09-28 | Stop reason: HOSPADM

## 2017-09-28 RX ORDER — SODIUM CHLORIDE 0.9 % (FLUSH) 0.9 %
5-10 SYRINGE (ML) INJECTION AS NEEDED
Status: DISCONTINUED | OUTPATIENT
Start: 2017-09-28 | End: 2017-09-28 | Stop reason: HOSPADM

## 2017-09-28 RX ORDER — INSULIN LISPRO 100 [IU]/ML
INJECTION, SOLUTION INTRAVENOUS; SUBCUTANEOUS ONCE
Status: DISCONTINUED | OUTPATIENT
Start: 2017-09-28 | End: 2017-09-28 | Stop reason: HOSPADM

## 2017-09-28 RX ORDER — LIDOCAINE HYDROCHLORIDE 20 MG/ML
INJECTION, SOLUTION EPIDURAL; INFILTRATION; INTRACAUDAL; PERINEURAL AS NEEDED
Status: DISCONTINUED | OUTPATIENT
Start: 2017-09-28 | End: 2017-09-28 | Stop reason: HOSPADM

## 2017-09-28 RX ORDER — ONDANSETRON 2 MG/ML
INJECTION INTRAMUSCULAR; INTRAVENOUS AS NEEDED
Status: DISCONTINUED | OUTPATIENT
Start: 2017-09-28 | End: 2017-09-28 | Stop reason: HOSPADM

## 2017-09-28 RX ADMIN — LIDOCAINE HYDROCHLORIDE 100 MG: 20 INJECTION, SOLUTION EPIDURAL; INFILTRATION; INTRACAUDAL; PERINEURAL at 14:40

## 2017-09-28 RX ADMIN — MIDAZOLAM HYDROCHLORIDE 2 MG: 1 INJECTION, SOLUTION INTRAMUSCULAR; INTRAVENOUS at 14:32

## 2017-09-28 RX ADMIN — FENTANYL CITRATE 50 MCG: 50 INJECTION, SOLUTION INTRAMUSCULAR; INTRAVENOUS at 15:18

## 2017-09-28 RX ADMIN — FENTANYL CITRATE 100 MCG: 50 INJECTION, SOLUTION INTRAMUSCULAR; INTRAVENOUS at 14:32

## 2017-09-28 RX ADMIN — ENOXAPARIN SODIUM 40 MG: 40 INJECTION SUBCUTANEOUS at 13:07

## 2017-09-28 RX ADMIN — FENTANYL CITRATE 50 MCG: 50 INJECTION, SOLUTION INTRAMUSCULAR; INTRAVENOUS at 15:27

## 2017-09-28 RX ADMIN — SODIUM CHLORIDE, SODIUM LACTATE, POTASSIUM CHLORIDE, AND CALCIUM CHLORIDE: 600; 310; 30; 20 INJECTION, SOLUTION INTRAVENOUS at 14:59

## 2017-09-28 RX ADMIN — ACETAMINOPHEN 1000 MG: 10 INJECTION, SOLUTION INTRAVENOUS at 14:53

## 2017-09-28 RX ADMIN — SODIUM CHLORIDE, SODIUM LACTATE, POTASSIUM CHLORIDE, AND CALCIUM CHLORIDE 125 ML/HR: 600; 310; 30; 20 INJECTION, SOLUTION INTRAVENOUS at 13:07

## 2017-09-28 RX ADMIN — ONDANSETRON 4 MG: 2 INJECTION INTRAMUSCULAR; INTRAVENOUS at 15:23

## 2017-09-28 RX ADMIN — ROCURONIUM BROMIDE 50 MG: 10 INJECTION, SOLUTION INTRAVENOUS at 14:40

## 2017-09-28 RX ADMIN — FENTANYL CITRATE 50 MCG: 50 INJECTION, SOLUTION INTRAMUSCULAR; INTRAVENOUS at 15:04

## 2017-09-28 RX ADMIN — PROPOFOL 200 MG: 10 INJECTION, EMULSION INTRAVENOUS at 14:40

## 2017-09-28 RX ADMIN — FENTANYL CITRATE 100 MCG: 50 INJECTION, SOLUTION INTRAMUSCULAR; INTRAVENOUS at 14:40

## 2017-09-28 RX ADMIN — ROCURONIUM BROMIDE 20 MG: 10 INJECTION, SOLUTION INTRAVENOUS at 15:06

## 2017-09-28 RX ADMIN — CEFAZOLIN 3 G: 1 INJECTION, POWDER, FOR SOLUTION INTRAMUSCULAR; INTRAVENOUS; PARENTERAL at 14:44

## 2017-09-28 NOTE — ANESTHESIA POSTPROCEDURE EVALUATION
Post-Anesthesia Evaluation & Assessment    Visit Vitals    /67    Pulse (!) 56    Temp 37 °C (98.6 °F)    Resp 16    Ht 6' 5\" (1.956 m)    Wt (!) 158.8 kg (350 lb 3 oz)    SpO2 98%    BMI 41.53 kg/m2       No untreated/active PONV    Post-operative hydration adequate. Adequate post-operative analgesia per PACU discharge criteria    Mental status & level of consciousness: alert and oriented x 3    Respiratory status: patent unassisted airway     No apparent anesthetic complications requiring additional post-anesthetic care    Patient has met all discharge requirements.             Noemy Hou, CRNA

## 2017-09-28 NOTE — ANESTHESIA PREPROCEDURE EVALUATION
Anesthetic History     PONV          Review of Systems / Medical History  Patient summary reviewed, nursing notes reviewed and pertinent labs reviewed    Pulmonary        Sleep apnea: CPAP           Neuro/Psych         Psychiatric history     Cardiovascular    Hypertension: well controlled        Dysrhythmias : atrial fibrillation      Exercise tolerance: >4 METS     GI/Hepatic/Renal           Liver disease     Endo/Other    Diabetes    Morbid obesity  Pertinent negatives: No hypothyroidism   Other Findings            Physical Exam    Airway  Mallampati: III  TM Distance: < 4 cm  Neck ROM: normal range of motion   Mouth opening: Normal     Cardiovascular    Rhythm: regular  Rate: normal         Dental  No notable dental hx       Pulmonary  Breath sounds clear to auscultation               Abdominal  GI exam deferred       Other Findings            Anesthetic Plan    ASA: 3  Anesthesia type: general          Induction: Intravenous  Anesthetic plan and risks discussed with: Patient

## 2017-09-28 NOTE — OP NOTES
[address] [place] [address]  OPERATIVE REPORT    Name:  Roshni Cain  MR#:  [id number]  :  1960  Account #:  [de-identified]  Date of Adm:  2017  Date of Surgery:  2017      PREOPERATIVE DIAGNOSIS: Chronic dysphagia and chronic  esophageal dilation, status post gastric band procedure. POSTOPERATIVE DIAGNOSIS: Chronic dysphagia and chronic  esophageal dilation, status post gastric band. PROCEDURES PERFORMED:  1. Laparoscopic lysis of adhesions. 2. Removal of lap band and port. SURGEON: Domingo Carrillo MD.    ASSISTANT: Davie Jones PA-C. ANESTHESIA: General endotracheal.    COMPLICATIONS: None. ESTIMATED BLOOD LOSS: Scant. SPECIMENS REMOVED: Lap-Band and port specimen. STATEMENT OF MEDICAL NECESSITY: The patient is a 82-year-old  male patient of mine who had a gastric band placed 4 years ago or  more. He initially did somewhat well with weight loss, but was lost to  followup and when he presented, he had significant dysphagia  symptoms. We evaluated him and he had a significant esophageal  dilation and chronic dysphagia. We removed the fluid from his band  and then the symptoms persisted such that at this time period he  would like to have the lap band removed. He is considering a more  aggressive procedure due to his progressive health issues and the  diagnosis of diabetes. DESCRIPTION OF PROCEDURE: The patient was brought to the  operating room, placed on the table in supine position. General  anesthesia was administered without any difficulty. His abdomen was  then prepped and draped in the usual sterile fashion. Using a 15 blade,  a 1 cm incision was made just left of midline about 4 fingerbreadths  below the costal margin. The Visi-Port was then used to gain access to  the peritoneal cavity, which was then insufflated.  The port was  exposed in the right upper quadrant region and the port itself was freed  up off of the fascia, which was somewhat difficult and it was removed  from the operative field. The remainder of trocars were then placed  and I began to dissect along the Lap-Band tubing. I dissected all the  way to the level of the subhepatic space. I then entered the capsule of  the band where I unbuckled the band itself. I then removed the band  from its location and removed it from the operative field in total. At this  time period I  Took down the additional adhesions along the plication. I was  able to release one of the plication sutures, but there was dense  adhesion right at the diaphragm level, and I elected not to pursue this  any further as I felt like if he pursues a sleeve gastrectomy or gastric  bypass at a later date we could dissect further in that area and remove  the distal suture if one was present. All areas were hemostatic. I did  place Surgicel in the upper abdomen. I then checked all areas for  hemostasis and all trocars were then removed under direct  visualization. All skin incisions were then closed using 4-0 subcuticular  Monocryl and Steri-Strips. Sterile dressings were applied. The patient  tolerated the procedure well.         Kathy Senior MD    AT / Raghu  D:  09/28/2017   16:17  T:  09/28/2017   16:52  Job #:  [id number]

## 2017-09-28 NOTE — BRIEF OP NOTE
BRIEF OPERATIVE NOTE    Date of Procedure: 9/28/2017   Preoperative Diagnosis: ESOPHAGEAL DILATION,REFLUX,STATUS POST BARIATRIC SURGERY/COMPLICATION  Postoperative Diagnosis: ESOPHAGEAL DILATION,REFLUX,STATUS POST BARIATRIC SURGERY/COMPLICATION    Procedure(s):  LAPAROSCOPIC LYSIS OF ADHESIONS  LAPAROSCOPIC ADJUSTABLE BAND AND PORT REMOVAL  Surgeon(s) and Role:     * Stephen Ortiz MD - Primary         Assistant Staff:  Physician Assistant: NORMAN Aguilar    Surgical Staff:  Circ-1: Vu Hou RN  Circ-Relief: Semaj Osborne RN  Physician Assistant: NORMAN Aguilar  Scrub Tech-1: Awilda Zaragoza  Scrub Tech-2: Mark Wong  Scrub RN-1: Seymour Frausto RN  Event Time In   Incision Start 1459   Incision Close      Anesthesia: General   Estimated Blood Loss: scant  Specimens:   ID Type Source Tests Collected by Time Destination   1 : LAP BAND AND PORT Fresh Other                  Stephen Ortiz MD 9/28/2017 1505 Pathology      Findings: chronic dilated pouch   Complications: none  Implants: * No implants in log *

## 2017-09-28 NOTE — IP AVS SNAPSHOT
Dinorah Munoz 
 
 
 [address] [place] [address] Patient: Cristy Cronin MRN: [id number] NJP:[id number] You are allergic to the following No active allergies Recent Documentation Height Weight BMI Smoking Status 1.956 m (!) 158.8 kg 41.53 kg/m2 Never Smoker Emergency Contacts Name Discharge Info Relation Home Work Mobile Carrasco,Joleen DISCHARGE CAREGIVER [3] Daughter [21]   [phone] CarrascoNeftaly rasmussen DISCHARGE CAREGIVER [3] Spouse [3] [phone] About your hospitalization You were admitted on:  September 28, 2017 You last received care in the:  [facility] PHASE 2 RECOVERY You were discharged on:  September 28, 2017 Unit phone number:    
  
Why you were hospitalized Your primary diagnosis was:  Not on File Providers Seen During Your Hospitalizations Provider Role Specialty Primary office phone Darling Kurtz MD Attending Provider General Surgery [phone] Your Primary Care Physician (PCP) Primary Care Physician Office Phone Office Fax Luci Vergara [phone] Follow-up Information Follow up With Details Comments Contact Info Emanuel Kahn, [address] 
[phone] Darling Kurtz MD Schedule an appointment as soon as possible for a visit in 2 week(s)  [address] [place] [address] [address] [place] [address] 
[phone] Your Appointments Thursday October 12, 2017 10:00 AM EDT  
POST OP with [facility] ([facility])  
 [facility] [address] [place] [address]  
[phone] Current Discharge Medication List  
  
START taking these medications Dose & Instructions Dispensing Information Comments Morning Noon Evening Bedtime amoxicillin-clavulanate 875-125 mg per tablet Commonly known as:  AUGMENTIN Your last dose was: Your next dose is:    
   
   
 Dose:  1 Tab Take 1 Tab by mouth two (2) times a day for 10 days. Quantity:  20 Tab Refills:  0  
     
   
   
   
  
 oxyCODONE-acetaminophen 5-325 mg per tablet Commonly known as:  PERCOCET Your last dose was: Your next dose is:    
   
   
 Dose:  1 Tab Take 1 Tab by mouth every four (4) hours as needed for Pain. Max Daily Amount: 6 Tabs. Quantity:  30 Tab Refills:  0 CONTINUE these medications which have NOT CHANGED Dose & Instructions Dispensing Information Comments Morning Noon Evening Bedtime ABILIFY 5 mg tablet Generic drug:  ARIPiprazole Your last dose was: Your next dose is:    
   
   
 Dose:  5 mg Take 5 mg by mouth daily. Refills:  0  
     
   
   
   
  
 buPROPion  mg SR tablet Commonly known as:  Betha Lamer Your last dose was: Your next dose is:    
   
   
 Dose:  300 mg Take 300 mg by mouth daily. 300 XR Refills:  0  
     
   
   
   
  
 cholecalciferol 1,000 unit Cap Commonly known as:  VITAMIN D3 Your last dose was: Your next dose is:    
   
   
 Dose:  1000 Units Take 1,000 Units by mouth daily. Refills:  0 FORTESTA TD Your last dose was: Your next dose is:    
   
   
 by TransDERmal route. Refills:  0 KLOR-CON M20 20 mEq tablet Generic drug:  potassium chloride Your last dose was: Your next dose is:    
   
   
 Dose:  20 mEq Take 20 mEq by mouth daily. Refills:  0 KRILL OIL (OMEGA 3 AND 6) PO Your last dose was: Your next dose is: Take  by mouth daily. Refills:  0  
     
   
   
   
  
 multivitamin tablet Commonly known as:  ONE A DAY Your last dose was: Your next dose is:    
   
   
 Dose:  1 Tab Take 1 Tab by mouth daily. Refills:  0  
     
   
   
   
  
 temazepam 30 mg capsule Commonly known as:  RESTORIL Your last dose was: Your next dose is:    
   
   
 Dose:  30 mg Take 30 mg by mouth nightly as needed for Sleep. Refills:  0 TIKOSYN PO Your last dose was: Your next dose is:    
   
   
 Dose:  0.5 mg Take 0.5 mg by mouth two (2) times a day. A-Fib Refills:  0 ZyrTEC 10 mg tablet Generic drug:  cetirizine Your last dose was: Your next dose is:    
   
   
 Dose:  10 mg Take 10 mg by mouth daily. Refills:  0 Where to Get Your Medications Information on where to get these meds will be given to you by the nurse or doctor. ! Ask your nurse or doctor about these medications  
  amoxicillin-clavulanate 875-125 mg per tablet  
 oxyCODONE-acetaminophen 5-325 mg per tablet Discharge Instructions DISCHARGE SUMMARY from Nurse The following personal items are in your possession at time of discharge: 
 
Dental Appliances: None Home Medications: None Jewelry: None Clothing: Shorts, Shirt, Footwear, Undergarments (to daughter) Other Valuables: Cell Phone, Nicole Lou (with daughter) PATIENT INSTRUCTIONS: 
 
After general anesthesia or intravenous sedation, for 24 hours or while taking prescription Narcotics: · Limit your activities · Do not drive and operate hazardous machinery · Do not make important personal or business decisions · Do  not drink alcoholic beverages · If you have not urinated within 8 hours after discharge, please contact your surgeon on call. Report the following to your surgeon: 
· Excessive pain, swelling, redness or odor of or around the surgical area · Temperature over 100.5 · Nausea and vomiting lasting longer than 4 hours or if unable to take medications · Any signs of decreased circulation or nerve impairment to extremity: change in color, persistent  numbness, tingling, coldness or increase pain · Any questions What to do at Home: 
Recommended activity: Activity as tolerated and Ambulate in house, No heavy lifting or strenuous activity for one week. If you experience any of the following symptoms fever greater than 101.0, chills, nausea or vomiting lasting longer than 4-hours, drainage from incision sites, pain not relieved with medication, please follow up with Dr. Mcdaniel Friend. Regular diet as tolerated. Increase fluid intake at home. Dressing may be removed in 48 hours. You may shower in 48-hours, but do not submerge in water; no pools, hot tubs or baths. If taking narcotic pain medication and no bowel movement in 2-days, take a stool softener. *  Please give a list of your current medications to your Primary Care Provider. *  Please update this list whenever your medications are discontinued, doses are 
    changed, or new medications (including over-the-counter products) are added. *  Please carry medication information at all times in case of emergency situations. These are general instructions for a healthy lifestyle: No smoking/ No tobacco products/ Avoid exposure to second hand smoke Surgeon General's Warning:  Quitting smoking now greatly reduces serious risk to your health. Obesity, smoking, and sedentary lifestyle greatly increases your risk for illness A healthy diet, regular physical exercise & weight monitoring are important for maintaining a healthy lifestyle You may be retaining fluid if you have a history of heart failure or if you experience any of the following symptoms:  Weight gain of 3 pounds or more overnight or 5 pounds in a week, increased swelling in our hands or feet or shortness of breath while lying flat in bed. Please call your doctor as soon as you notice any of these symptoms; do not wait until your next office visit. Recognize signs and symptoms of STROKE: 
 
F-face looks uneven A-arms unable to move or move unevenly S-speech slurred or non-existent T-time-call 911 as soon as signs and symptoms begin-DO NOT go Back to bed or wait to see if you get better-TIME IS BRAIN. Warning Signs of HEART ATTACK Call 911 if you have these symptoms: 
? Chest discomfort. Most heart attacks involve discomfort in the center of the chest that lasts more than a few minutes, or that goes away and comes back. It can feel like uncomfortable pressure, squeezing, fullness, or pain. ? Discomfort in other areas of the upper body. Symptoms can include pain or discomfort in one or both arms, the back, neck, jaw, or stomach. ? Shortness of breath with or without chest discomfort. ? Other signs may include breaking out in a cold sweat, nausea, or lightheadedness. Don't wait more than five minutes to call [address]! Fast action can save your life. Calling 911 is almost always the fastest way to get lifesaving treatment. Emergency Medical Services staff can begin treatment when they arrive  up to an hour sooner than if someone gets to the hospital by car. The discharge information has been reviewed with the patient and caregiver. The patient and caregiver verbalized understanding. Discharge medications reviewed with the patient and caregiver and appropriate educational materials and side effects teaching were provided. Patient armband removed and shredded. Discharge Orders None Introducing [facility]! [facility] introduces [employer] patient portal. Now you can access parts of your medical record, email your doctor's office, and request medication refills online.    
 
1. In your internet browser, go to [email or web address] 2. Click on the First Time User? Click Here link in the Sign In box. You will see the New Member Sign Up page. 3. Enter your [employer] Access Code exactly as it appears below. You will not need to use this code after youve completed the sign-up process. If you do not sign up before the expiration date, you must request a new code. · [employer] Access Code: [email or web address] Expires: 10/25/2017  9:32 AM 
 
4. Enter the last four digits of your Social Security Number (xxxx) and Date of Birth (mm/dd/yyyy) as indicated and click Submit. You will be taken to the next sign-up page. 5. Create a [employer] ID. This will be your [employer] login ID and cannot be changed, so think of one that is secure and easy to remember. 6. Create a [employer] password. You can change your password at any time. 7. Enter your Password Reset Question and Answer. This can be used at a later time if you forget your password. 8. Enter your e-mail address. You will receive e-mail notification when new information is available in [address] 9. Click Sign Up. You can now view and download portions of your medical record. 10. Click the Download Summary menu link to download a portable copy of your medical information. If you have questions, please visit the Frequently Asked Questions section of the [employer] website. Remember, [employer] is NOT to be used for urgent needs. For medical emergencies, dial 911. Now available from your iPhone and Android! General Information Please provide this summary of care documentation to your next provider. Patient Signature:  ____________________________________________________________ Date:  ____________________________________________________________  
  
John Ala Provider Signature:  ____________________________________________________________ Date:  ____________________________________________________________

## 2017-09-28 NOTE — H&P
Yulia Pearson is a prior laparoscopic adjustable gastric band surgery   patient who underwent their weight loss surgical procedure procedure approximately 4 years ago. Despite adequate weight loss initially, the patient eventually developed pouch issues with dilation and chronic dysphagia and  that resulted in a significant issues with weight maintenance and subsequent weight regain . Despite several efforts to correct the issue with non-surgical means he has been unable realize his initial success. Yulia Pearson is now considering a revision procedure to the laparoscopic sleeve gastrectomy. Patient Active Problem List    Diagnosis Date Noted    H/O laparoscopic adjustable gastric banding 08/10/2016    H/O lumbosacral spine surgery 08/10/2016    S/P appendectomy 08/10/2016    SHAW (nonalcoholic steatohepatitis) 11/07/2013    History of atrial fibrillation     Depression     Atrial fibrillation ([facility] 11/19/2012    Morbid obesity ([facility] 11/19/2012    High triglycerides 08/01/2012    Sleep apnea 08/01/2012    Chronic back pain 08/01/2012    Hypertension       Past Surgical History:   Procedure Laterality Date    CARDIAC CATHETERIZATION  1/2013    HX APPENDECTOMY      HX HEENT      UPP    HX LUMBAR DISKECTOMY      HX ORTHOPAEDIC      L1-S5 diskectomy    LAP, PLACE ADJUST GASTR BAND        Social History   Substance Use Topics    Smoking status: Never Smoker    Smokeless tobacco: Never Used    Alcohol use 3.0 oz/week     6 Cans of beer per week      Family History   Problem Relation Age of Onset    Hypertension Mother     Diabetes Mother     Heart Disease Father       Current Outpatient Prescriptions   Medication Sig Dispense Refill    ARIPiprazole (ABILIFY) 5 mg tablet Take 5 mg by mouth daily.  potassium chloride (KLOR-CON M20) 20 mEq tablet Take 20 mEq by mouth daily.  temazepam (RESTORIL) 30 mg capsule Take 30 mg by mouth nightly as needed for Sleep.       KRILL/OM3/DHA/EPA/OM6/LIP/ASTX (KRILL OIL, OMEGA 3 AND 6, PO) Take  by mouth daily.  cholecalciferol (VITAMIN D3) 1,000 unit cap Take 1,000 Units by mouth daily.  TESTOSTERONE (FORTESTA TD) by TransDERmal route.  DOFETILIDE (TIKOSYN PO) Take 0.5 mg by mouth two (2) times a day. A-Fib      multivitamin (ONE A DAY) tablet Take 1 Tab by mouth daily.  cetirizine (ZYRTEC) 10 mg tablet Take 10 mg by mouth daily.  buPROPion SR (WELLBUTRIN SR) 150 mg SR tablet Take 300 mg by mouth daily. 300 XR       No Known Allergies       Review of Systems:        General - No history or complaints of unexpected fever, chills, or weight loss  Head/Neck - No history or complaints of headache, diplopia, dysphagia, hearing loss  Cardiac - No history or complaints of chest pain, palpitations, murmur, or shortness of breath  Pulmonary - No history or complaints of shortness of breath, productive cough, hemoptysis  Gastrointestinal - No history or complaints of reflux,  abdominal pain, obstipation/constipation, blood per rectum  Genitourinary - No history or complaints of hematuria/dysuria, stress urinary incontinence symptoms, or renal lithiasis  Musculoskeletal - No history or complaints of joint pain or muscular weakness  Hematologic - No history or complaints of bleeding disorders, blood transfusions, sickle cell anemia  Neurologic - No history or complaints of  migraine headaches, seizure activity, syncopal episodes, TIA or stroke  Integumentary - No history or complaints of rashes, abnormal nevi, skin cancer    Objective: There were no vitals taken for this visit.     Physical Examination: General appearance - alert, well appearing, and in no distress  Mental status - alert, oriented to person, place, and time  Eyes - pupils equal and reactive, extraocular eye movements intact  Ears - bilateral TM's and external ear canals normal  Nose - normal and patent, no erythema, discharge or polyps  Mouth - mucous membranes moist, pharynx normal without lesions  Neck - supple, no significant adenopathy  Lymphatics - no palpable lymphadenopathy, no hepatosplenomegaly  Chest - clear to auscultation, no wheezes, rales or rhonchi, symmetric air entry  Heart - normal rate, regular rhythm, normal S1, S2, no murmurs, rubs, clicks or gallops  Abdomen - soft, nontender, nondistended, no masses or organomegaly  Back exam - full range of motion, no tenderness, palpable spasm or pain on motion  Neurological - alert, oriented, normal speech, no focal findings or movement disorder noted  Musculoskeletal - no joint tenderness, deformity or swelling  Extremities - peripheral pulses normal, no pedal edema, no clubbing or cyanosis  Skin - normal coloration and turgor, no rashes, no suspicious skin lesions noted    Labs:     Recent Results (from the past 1008 hour(s))   EKG, 12 LEAD, INITIAL    Collection Time: 09/18/17 11:23 AM   Result Value Ref Range    Ventricular Rate 61 BPM    Atrial Rate 61 BPM    P-R Interval 176 ms    QRS Duration 86 ms    Q-T Interval 416 ms    QTC Calculation (Bezet) 418 ms    Calculated P Axis 59 degrees    Calculated R Axis 57 degrees    Calculated T Axis -12 degrees    Diagnosis       Normal sinus rhythm  T wave abnormality, consider inferior ischemia  Abnormal ECG  When compared with ECG of 04-OCT-2013 10:52,  No significant change was found  Confirmed by Diana Branch MD. ([id number]) on 9/18/2017 11:38:13 PM     CBC WITH AUTOMATED DIFF    Collection Time: 09/18/17 11:27 AM   Result Value Ref Range    WBC 4.6 4.6 - 13.2 K/uL    RBC 5.03 4.70 - 5.50 M/uL    HGB 15.8 13.0 - 16.0 g/dL    HCT 44.8 36.0 - 48.0 %    MCV 89.1 74.0 - 97.0 FL    MCH 31.4 24.0 - 34.0 PG    MCHC 35.3 31.0 - 37.0 g/dL    RDW 14.7 (H) 11.6 - 14.5 %    PLATELET 143 528 - 211 K/uL    MPV 10.2 9.2 - 11.8 FL    NEUTROPHILS 52 40 - 73 %    LYMPHOCYTES 34 21 - 52 %    MONOCYTES 10 3 - 10 %    EOSINOPHILS 4 0 - 5 %    BASOPHILS 0 0 - 2 %    ABS. NEUTROPHILS 2.4 1.8 - 8.0 K/UL    ABS. LYMPHOCYTES 1.6 0.9 - 3.6 K/UL    ABS. MONOCYTES 0.5 0.05 - 1.2 K/UL    ABS. EOSINOPHILS 0.2 0.0 - 0.4 K/UL    ABS. BASOPHILS 0.0 0.0 - 0.06 K/UL    DF AUTOMATED     METABOLIC PANEL, COMPREHENSIVE    Collection Time: 09/18/17 11:27 AM   Result Value Ref Range    Sodium 140 136 - 145 mmol/L    Potassium 4.0 3.5 - 5.5 mmol/L    Chloride 105 100 - 108 mmol/L    CO2 26 21 - 32 mmol/L    Anion gap 9 3.0 - 18 mmol/L    Glucose 130 (H) 74 - 99 mg/dL    BUN 14 7.0 - 18 MG/DL    Creatinine 0.95 0.6 - 1.3 MG/DL    BUN/Creatinine ratio 15 12 - 20      GFR est AA >60 >60 ml/min/1.73m2    GFR est non-AA >60 >60 ml/min/1.73m2    Calcium 9.3 8.5 - 10.1 MG/DL    Bilirubin, total 1.8 (H) 0.2 - 1.0 MG/DL    ALT (SGPT) 105 (H) 16 - 61 U/L    AST (SGOT) 50 (H) 15 - 37 U/L    Alk. phosphatase 68 45 - 117 U/L    Protein, total 7.3 6.4 - 8.2 g/dL    Albumin 3.9 3.4 - 5.0 g/dL    Globulin 3.4 2.0 - 4.0 g/dL    A-G Ratio 1.1 0.8 - 1.7             Assessment:     Morbid obesity status post  laparoscopic adjustable gastric band surgery  now with request for revision to other procedure    Plan:     1. At this time period I have spent 45 minutes discussing her anatomy and the challenges with revision of her prior procedure . She also understands that weight loss surgery is not as effective the second time around and carries a much higher risk overall. We will obtain a repeat UGI study too assess the size of her pouch  to see if she is a surgical candidate. She agrees to proceed in this fashion. At this time period Lizette Tinoco wishes to proceed with gastric band removal due to chronic dysphagia related to pouch dilation. We will proceed with gastric band removal as described to the patient. he is also considering a conversion to the sleeve resection procedure. At this time it has been explained to the patient that any conversion procedure will be carried out in a two-stage fashion.   Elizabeth Kate Roopa Hatch understands the need for this two-stage approach due to significant scar tissue associated with the gastric band. he also understands the risks, benefits, and alternatives of the gastric banding removal procedure. The risks include but not limited to; death, DVT/PE, stomach perforation, felice-operative bleeding and felice-operative infection. The patient understands all of the above and wishes to proceed. he also understands the the second phase of any conversion would take place no sooner than 8-12 weeks post band removal.  The patient understands this time is needed to allow the natural stomach anatomy to return to normal which is essential to any conversion procedure.

## 2017-09-28 NOTE — DISCHARGE INSTRUCTIONS
DISCHARGE SUMMARY from Nurse    The following personal items are in your possession at time of discharge:    Dental Appliances: None        Home Medications: None  Jewelry: None  Clothing: Shorts, Shirt, Footwear, Undergarments (to daughter)  Other Valuables: Cell Phone, Jeramie Nageotte (with daughter)             PATIENT INSTRUCTIONS:    After general anesthesia or intravenous sedation, for 24 hours or while taking prescription Narcotics:  · Limit your activities  · Do not drive and operate hazardous machinery  · Do not make important personal or business decisions  · Do  not drink alcoholic beverages  · If you have not urinated within 8 hours after discharge, please contact your surgeon on call. Report the following to your surgeon:  · Excessive pain, swelling, redness or odor of or around the surgical area  · Temperature over 100.5  · Nausea and vomiting lasting longer than 4 hours or if unable to take medications  · Any signs of decreased circulation or nerve impairment to extremity: change in color, persistent  numbness, tingling, coldness or increase pain  · Any questions        What to do at Home:  Recommended activity: Activity as tolerated and Ambulate in house, No heavy lifting or strenuous activity for one week. If you experience any of the following symptoms fever greater than 101.0, chills, nausea or vomiting lasting longer than 4-hours, drainage from incision sites, pain not relieved with medication, please follow up with Dr. Doug Wallace. Regular diet as tolerated. Increase fluid intake at home. Dressing may be removed in 48 hours. You may shower in 48-hours, but do not submerge in water; no pools, hot tubs or baths. If taking narcotic pain medication and no bowel movement in 2-days, take a stool softener. *  Please give a list of your current medications to your Primary Care Provider.     *  Please update this list whenever your medications are discontinued, doses are      changed, or new medications (including over-the-counter products) are added. *  Please carry medication information at all times in case of emergency situations. These are general instructions for a healthy lifestyle:    No smoking/ No tobacco products/ Avoid exposure to second hand smoke    Surgeon General's Warning:  Quitting smoking now greatly reduces serious risk to your health. Obesity, smoking, and sedentary lifestyle greatly increases your risk for illness    A healthy diet, regular physical exercise & weight monitoring are important for maintaining a healthy lifestyle    You may be retaining fluid if you have a history of heart failure or if you experience any of the following symptoms:  Weight gain of 3 pounds or more overnight or 5 pounds in a week, increased swelling in our hands or feet or shortness of breath while lying flat in bed. Please call your doctor as soon as you notice any of these symptoms; do not wait until your next office visit. Recognize signs and symptoms of STROKE:    F-face looks uneven    A-arms unable to move or move unevenly    S-speech slurred or non-existent    T-time-call 911 as soon as signs and symptoms begin-DO NOT go       Back to bed or wait to see if you get better-TIME IS BRAIN. Warning Signs of HEART ATTACK     Call 911 if you have these symptoms:   Chest discomfort. Most heart attacks involve discomfort in the center of the chest that lasts more than a few minutes, or that goes away and comes back. It can feel like uncomfortable pressure, squeezing, fullness, or pain.  Discomfort in other areas of the upper body. Symptoms can include pain or discomfort in one or both arms, the back, neck, jaw, or stomach.  Shortness of breath with or without chest discomfort.  Other signs may include breaking out in a cold sweat, nausea, or lightheadedness. Don't wait more than five minutes to call 911 - MINUTES MATTER! Fast action can save your life.  Calling 911 is almost always the fastest way to get lifesaving treatment. Emergency Medical Services staff can begin treatment when they arrive -- up to an hour sooner than if someone gets to the hospital by car. The discharge information has been reviewed with the patient and caregiver. The patient and caregiver verbalized understanding. Discharge medications reviewed with the patient and caregiver and appropriate educational materials and side effects teaching were provided. Patient armband removed and shredded.

## 2017-10-12 ENCOUNTER — OFFICE VISIT (OUTPATIENT)
Dept: SURGERY | Age: 57
End: 2017-10-12

## 2017-10-12 VITALS
DIASTOLIC BLOOD PRESSURE: 64 MMHG | BODY MASS INDEX: 37.19 KG/M2 | WEIGHT: 315 LBS | SYSTOLIC BLOOD PRESSURE: 147 MMHG | HEART RATE: 83 BPM | OXYGEN SATURATION: 97 % | HEIGHT: 77 IN

## 2017-10-12 DIAGNOSIS — Z98.84 HISTORY OF REMOVAL OF LAPAROSCOPIC GASTRIC BANDING DEVICE: Primary | ICD-10-CM

## 2017-10-12 DIAGNOSIS — M25.511 ACUTE PAIN OF RIGHT SHOULDER: ICD-10-CM

## 2017-10-12 RX ORDER — CYCLOBENZAPRINE HCL 10 MG
5 TABLET ORAL
Qty: 9 TAB | Refills: 0 | Status: SHIPPED | OUTPATIENT
Start: 2017-10-12 | End: 2018-03-15

## 2017-10-12 NOTE — PROGRESS NOTES
Subjective:      Miguelangel Vigil is a 56 y.o. male presents for postop care 2 weeks status post laparoscopic lap band removal.  After surgery patient had pain in neck and bilateral shoulders, all of the pain has resolved except in his right shoulder. He states that he believes it is because of the position that he was placed in with his arms stretched out to the side during surgery. Pain is musculoskeletal pain in deltoid that is 8/10 pain with movement and if he is laying on his side. Pain is not well controlled in patient right shoulder. Appetite is good. Eating a regular diet without difficulty. Bowel movements are regular. Objective: There were no vitals taken for this visit. General:  alert, cooperative, no distress, appears stated age   Abdomen: soft, bowel sounds active, non-tender   Incision:   healing well, no drainage, no erythema, no hernia, no seroma, no swelling, no dehiscence, incision well approximated       Pathology:    Assessment:     Doing well postoperatively. Right shoulder pain. Plan:     - 3 days of Flexeril given to help patient with right shoulder pain and pt is to apply heat to the area. He was warned to not operate heavy machinery or drive while taking flexeril.  - Wound care discussed  - Pt is to increase activities as tolerated. - followup with dietician for pre-operative visits to prepare for revision surgery or if patient has questions, concerns or worsening of condition. If we are not available, patient is to go to the Emergency Department.

## 2017-10-12 NOTE — PATIENT INSTRUCTIONS
Patient Instructions      1. Flexeril is for right shoulder pain and is to be taken as needed. Also try adding heat to the area. Do not operate machinery or drive while taking Flexeril. 2. Make sure you do not gain weight between now and when you have your revision surgery. 3. Remember hydration goals - minimum of 64 ounces of liquids per day (dehydration is the number one reason for hospital readmission). 4. Continue to monitor carbohydrate and protein intake you need a minimum of  Grams of protein daily- remember to keep your total carbohydrates to 50 grams or less per day for best results. 5. Continue to work towards exercise goals - 60-90 minutes, 5 times a week minimum of deliberate, aerobic exercise is the ultimate goal with strength training 2 times each week. Refer to [employer] for  information. 6. Remember to take vitamins as directed in your handbook. 7. Attend support group the 2nd Thursday of each month. 8. Use Miralax if you become constipated. 9. Call us at [phone] or email us through [email or web address]" with questions,     concerns or worsening of condition, we have someone on call 24 hours a day. If you are unable to reach our office, you are to go to your Primary Care Physician or the Emergency Department. Supplement Resource Guide    Importance of Protein:   Maintains lean body mass, produces antibodies to fight off infections, heals wounds, minimizes hair loss, helps to give you energy, helps with satiety, and keeping you full between meals. Importance of Calcium:  Needed for healthy bones and teeth, normal blood clotting, and nervous system functioning, higher risk of osteoporosis and bone disease with non-compliance. Importance of Multivitamins: Many functions. Supply you with extra nutrients that you may be missing from food.   May lead to iron deficiency anemia, weakness, fatigue, and many other symptoms with non-compliance. Importance of B Vitamins:  Important for red blood cell formation, metabolism, energy, and helps to maintain a healthy nervous system. Protein Supplement  Find one you like now. Use immediately after surgery. Look for:  35-50g protein each day from your protein supplement once you reach the progression diet. 0-3 g fat per serving  0-3 g sugar per serving    Protein drinks should be split in separate dosages. Recommend: Lifelong  1 year + Calcium Supplement:     Start taking within a month after surgery. Look for: Calcium Citrate Plus D (1500 mg per day)  Recommend: Citracal     .            Avoid chocolate chewable calcium. Can use chewable bariatric or [employer] brand or similar chewable. The body cannot absorb more than 500-600 mg of calcium at a time. Take for Life Multi-vitamin Supplement:      Start immediately after surgery: any complete chewable, such as: [place]s Complete chewables. Avoid [place] sours or gummies. They lack iron and other important nutrients and also have added sugar. Continue with chewable vitamin or change to adult complete multivitamin one month after surgery. Menstruating women can take a prenatal vitamin. Make sure has at least 18 mg iron and 290-601 mcg folic acid   Vitamin R51, B Complex Vitamin, and Biotin  Start taking within a month after surgery. Vitamin B12:  1000 mcg of Vitamin B12 three times weekly    Must take sublingually (meaning you take it under your tongue) or in a liquid drop form for easy absorption. B Complex Vitamin: Take a pill or liquid drop form once daily. Biotin: This vitamin can help prevent hair loss.     Recommend 5mg   (5000 mcg) a day  Biotin is Optional

## 2017-11-28 ENCOUNTER — TELEPHONE (OUTPATIENT)
Dept: SURGERY | Age: 57
End: 2017-11-28

## 2017-12-06 DIAGNOSIS — K76.0 FATTY LIVER DISEASE, NONALCOHOLIC: Primary | ICD-10-CM

## 2017-12-16 ENCOUNTER — HOSPITAL ENCOUNTER (OUTPATIENT)
Dept: ULTRASOUND IMAGING | Age: 57
Discharge: HOME | End: 2017-12-16
Payer: OTHER GOVERNMENT

## 2017-12-16 DIAGNOSIS — K76.0 FATTY LIVER DISEASE, NONALCOHOLIC: ICD-10-CM

## 2017-12-16 PROCEDURE — 0346T US ABD LTD W ELASTOGRAPHY: CPT

## 2017-12-23 DIAGNOSIS — K76.0 FATTY LIVER: Primary | ICD-10-CM

## 2018-01-09 ENCOUNTER — HOSPITAL ENCOUNTER (OUTPATIENT)
Dept: MRI IMAGING | Age: 58
Discharge: HOME | End: 2018-01-09
Payer: OTHER GOVERNMENT

## 2018-01-09 DIAGNOSIS — K76.0 FATTY LIVER: ICD-10-CM

## 2018-01-09 PROCEDURE — 74011250636 HC RX REV CODE- 250/636: Performed by: NURSE PRACTITIONER

## 2018-01-09 PROCEDURE — A9577 INJ MULTIHANCE: HCPCS | Performed by: NURSE PRACTITIONER

## 2018-01-09 PROCEDURE — 74183 MRI ABD W/O CNTR FLWD CNTR: CPT

## 2018-01-09 RX ADMIN — GADOBENATE DIMEGLUMINE 10 ML: 529 INJECTION, SOLUTION INTRAVENOUS at 10:08

## 2018-01-09 RX ADMIN — GADOBENATE DIMEGLUMINE 20 ML: 529 INJECTION, SOLUTION INTRAVENOUS at 10:08

## 2018-01-11 NOTE — PROGRESS NOTES
Called the patient and relayed the results of the MRI as per KAMI Martinez. Pt verbalized understanding.

## 2018-01-11 NOTE — PROGRESS NOTES
Called patient to inform patient of Yaw Duff's findings. Patient didn't answer phone. Voicemail left stating to give a call to [phone] at their earliest convenience.

## 2018-03-01 DIAGNOSIS — G47.30 SLEEP APNEA, UNSPECIFIED TYPE: ICD-10-CM

## 2018-03-01 DIAGNOSIS — E66.01 MORBID OBESITY (HCC): ICD-10-CM

## 2018-03-01 DIAGNOSIS — I10 ESSENTIAL HYPERTENSION: Primary | ICD-10-CM

## 2018-03-01 DIAGNOSIS — Z01.812 BLOOD TESTS PRIOR TO TREATMENT OR PROCEDURE: ICD-10-CM

## 2018-03-23 RX ORDER — PHENOL/SODIUM PHENOLATE
20 AEROSOL, SPRAY (ML) MUCOUS MEMBRANE DAILY
Qty: 30 TAB | Refills: 1 | Status: SHIPPED | OUTPATIENT
Start: 2018-03-23 | End: 2018-08-09

## 2018-03-23 RX ORDER — OXYCODONE AND ACETAMINOPHEN 5; 325 MG/1; MG/1
1 TABLET ORAL
Qty: 30 TAB | Refills: 0 | Status: SHIPPED | OUTPATIENT
Start: 2018-03-23 | End: 2018-04-19 | Stop reason: ALTCHOICE

## 2018-03-26 ENCOUNTER — OFFICE VISIT (OUTPATIENT)
Dept: SURGERY | Age: 58
End: 2018-03-26

## 2018-03-26 ENCOUNTER — HOSPITAL ENCOUNTER (OUTPATIENT)
Dept: PREADMISSION TESTING | Age: 58
Discharge: HOME | End: 2018-03-26
Payer: OTHER GOVERNMENT

## 2018-03-26 VITALS
BODY MASS INDEX: 37.19 KG/M2 | RESPIRATION RATE: 16 BRPM | HEIGHT: 77 IN | WEIGHT: 315 LBS | SYSTOLIC BLOOD PRESSURE: 143 MMHG | DIASTOLIC BLOOD PRESSURE: 68 MMHG | HEART RATE: 70 BPM | OXYGEN SATURATION: 99 %

## 2018-03-26 DIAGNOSIS — I10 ESSENTIAL HYPERTENSION: ICD-10-CM

## 2018-03-26 DIAGNOSIS — E66.01 MORBID OBESITY (HCC): ICD-10-CM

## 2018-03-26 DIAGNOSIS — E66.01 MORBID OBESITY WITH BMI OF 40.0-44.9, ADULT (HCC): Primary | ICD-10-CM

## 2018-03-26 DIAGNOSIS — G89.18 POST-OP PAIN: Primary | ICD-10-CM

## 2018-03-26 DIAGNOSIS — Z01.812 BLOOD TESTS PRIOR TO TREATMENT OR PROCEDURE: ICD-10-CM

## 2018-03-26 DIAGNOSIS — G47.30 SLEEP APNEA, UNSPECIFIED TYPE: ICD-10-CM

## 2018-03-26 DIAGNOSIS — I48.91 ATRIAL FIBRILLATION, UNSPECIFIED TYPE (HCC): ICD-10-CM

## 2018-03-26 DIAGNOSIS — F32.A DEPRESSION, UNSPECIFIED DEPRESSION TYPE: ICD-10-CM

## 2018-03-26 DIAGNOSIS — K21.9 GASTROESOPHAGEAL REFLUX DISEASE WITHOUT ESOPHAGITIS: ICD-10-CM

## 2018-03-26 LAB
ABO + RH BLD: NORMAL
ALBUMIN SERPL-MCNC: 4.1 G/DL (ref 3.4–5)
ALBUMIN/GLOB SERPL: 1.1 {RATIO} (ref 0.8–1.7)
ALP SERPL-CCNC: 62 U/L (ref 45–117)
ALT SERPL-CCNC: 84 U/L (ref 16–61)
ANION GAP SERPL CALC-SCNC: 5 MMOL/L (ref 3–18)
AST SERPL-CCNC: 34 U/L (ref 15–37)
BASOPHILS # BLD: 0 K/UL (ref 0–0.06)
BASOPHILS NFR BLD: 1 % (ref 0–2)
BILIRUB SERPL-MCNC: 1.8 MG/DL (ref 0.2–1)
BLOOD GROUP ANTIBODIES SERPL: NORMAL
BUN SERPL-MCNC: 12 MG/DL (ref 7–18)
BUN/CREAT SERPL: 13 (ref 12–20)
CALCIUM SERPL-MCNC: 8.9 MG/DL (ref 8.5–10.1)
CHLORIDE SERPL-SCNC: 107 MMOL/L (ref 100–108)
CO2 SERPL-SCNC: 26 MMOL/L (ref 21–32)
CREAT SERPL-MCNC: 0.9 MG/DL (ref 0.6–1.3)
DIFFERENTIAL METHOD BLD: ABNORMAL
EOSINOPHIL # BLD: 0.2 K/UL (ref 0–0.4)
EOSINOPHIL NFR BLD: 2 % (ref 0–5)
ERYTHROCYTE [DISTWIDTH] IN BLOOD BY AUTOMATED COUNT: 13.3 % (ref 11.6–14.5)
GLOBULIN SER CALC-MCNC: 3.8 G/DL (ref 2–4)
GLUCOSE SERPL-MCNC: 74 MG/DL (ref 74–99)
HCT VFR BLD AUTO: 49.7 % (ref 36–48)
HGB BLD-MCNC: 17.5 G/DL (ref 13–16)
LYMPHOCYTES # BLD: 2.3 K/UL (ref 0.9–3.6)
LYMPHOCYTES NFR BLD: 36 % (ref 21–52)
MCH RBC QN AUTO: 32.8 PG (ref 24–34)
MCHC RBC AUTO-ENTMCNC: 35.2 G/DL (ref 31–37)
MCV RBC AUTO: 93.2 FL (ref 74–97)
MONOCYTES # BLD: 0.8 K/UL (ref 0.05–1.2)
MONOCYTES NFR BLD: 12 % (ref 3–10)
NEUTS SEG # BLD: 3.1 K/UL (ref 1.8–8)
NEUTS SEG NFR BLD: 49 % (ref 40–73)
PLATELET # BLD AUTO: 190 K/UL (ref 135–420)
PMV BLD AUTO: 10.7 FL (ref 9.2–11.8)
POTASSIUM SERPL-SCNC: 3.9 MMOL/L (ref 3.5–5.5)
PROT SERPL-MCNC: 7.9 G/DL (ref 6.4–8.2)
RBC # BLD AUTO: 5.33 M/UL (ref 4.7–5.5)
SODIUM SERPL-SCNC: 138 MMOL/L (ref 136–145)
SPECIMEN EXP DATE BLD: NORMAL
WBC # BLD AUTO: 6.3 K/UL (ref 4.6–13.2)

## 2018-03-26 PROCEDURE — 80053 COMPREHEN METABOLIC PANEL: CPT | Performed by: SPECIALIST

## 2018-03-26 PROCEDURE — 85025 COMPLETE CBC W/AUTO DIFF WBC: CPT | Performed by: SPECIALIST

## 2018-03-26 PROCEDURE — 86901 BLOOD TYPING SEROLOGIC RH(D): CPT | Performed by: SPECIALIST

## 2018-03-26 PROCEDURE — 36415 COLL VENOUS BLD VENIPUNCTURE: CPT | Performed by: SPECIALIST

## 2018-03-26 PROCEDURE — 93005 ELECTROCARDIOGRAM TRACING: CPT

## 2018-03-26 NOTE — PROGRESS NOTES
Appears to have a good understanding of the diet progression, food choices, and dietary/exercise habits for successful weight loss and nourishment after surgery. The class material included: post-op diet progression, including liquid, pureed, and low fat, low sugar food recommendations; proper food group choices, and encouraging dietary and exercise habits that lead to weight loss success.      Crys Rodriguez RD

## 2018-03-26 NOTE — PATIENT INSTRUCTIONS
Body Mass Index: Care Instructions  Your Care Instructions    Body mass index (BMI) can help you see if your weight is raising your risk for health problems. It uses a formula to compare how much you weigh with how tall you are. · A BMI lower than 18.5 is considered underweight. · A BMI between 18.5 and 24.9 is considered healthy. · A BMI between 25 and 29.9 is considered overweight. A BMI of 30 or higher is considered obese. If your BMI is in the normal range, it means that you have a lower risk for weight-related health problems. If your BMI is in the overweight or obese range, you may be at increased risk for weight-related health problems, such as high blood pressure, heart disease, stroke, arthritis or joint pain, and diabetes. If your BMI is in the underweight range, you may be at increased risk for health problems such as fatigue, lower protection (immunity) against illness, muscle loss, bone loss, hair loss, and hormone problems. BMI is just one measure of your risk for weight-related health problems. You may be at higher risk for health problems if you are not active, you eat an unhealthy diet, or you drink too much alcohol or use tobacco products. Follow-up care is a key part of your treatment and safety. Be sure to make and go to all appointments, and call your doctor if you are having problems. It's also a good idea to know your test results and keep a list of the medicines you take. How can you care for yourself at home? · Practice healthy eating habits. This includes eating plenty of fruits, vegetables, whole grains, lean protein, and low-fat dairy. · If your doctor recommends it, get more exercise. Walking is a good choice. Bit by bit, increase the amount you walk every day. Try for at least 30 minutes on most days of the week. · Do not smoke. Smoking can increase your risk for health problems. If you need help quitting, talk to your doctor about stop-smoking programs and medicines. These can increase your chances of quitting for good. · Limit alcohol to 2 drinks a day for men and 1 drink a day for women. Too much alcohol can cause health problems. If you have a BMI higher than 25  · Your doctor may do other tests to check your risk for weight-related health problems. This may include measuring the distance around your waist. A waist measurement of more than 40 inches in men or 35 inches in women can increase the risk of weight-related health problems. · Talk with your doctor about steps you can take to stay healthy or improve your health. You may need to make lifestyle changes to lose weight and stay healthy, such as changing your diet and getting regular exercise. If you have a BMI lower than 18.5  · Your doctor may do other tests to check your risk for health problems. · Talk with your doctor about steps you can take to stay healthy or improve your health. You may need to make lifestyle changes to gain or maintain weight and stay healthy, such as getting more healthy foods in your diet and doing exercises to build muscle. Where can you learn more? Go to http://royer-neil.info/. Enter S176 in the search box to learn more about \"Body Mass Index: Care Instructions. \"  Current as of: October 13, 2016  Content Version: 11.4  © 1230-4431 [employer]. Care instructions adapted under license by [employer] (which disclaims liability or warranty for this information). If you have questions about a medical condition or this instruction, always ask your healthcare professional. [address] any warranty or liability for your use of this information.

## 2018-03-26 NOTE — PROGRESS NOTES
Sleeve Gastrectomy - History and Physical    Subjective: The patient is a 66 y.o. obese male with a Body mass index is 41.86 kg/(m^2). .   he presents now to review their work up to date to see if they are a candidate for surgery and whether or not to proceed with the previously requested procedure. He is a prior gastric banding pt who had his band removed late 9/2017. Bariatric comorbidities continue to include:   Patient Active Problem List   Diagnosis Code    High triglycerides E78.1    Sleep apnea G47.30    Chronic back pain M54.9, G89.29    Hypertension I10    Atrial fibrillation (HCC) I48.91    Morbid obesity (HCC) E66.01    History of atrial fibrillation Z86.79    Depression F32.9    SHAW (nonalcoholic steatohepatitis) K75.81    H/O laparoscopic adjustable gastric banding Z98.84    H/O lumbosacral spine surgery Z98.890    S/P appendectomy Z90.49    History of removal of laparoscopic gastric banding device Z98.84    BMI 40.0-44.9, adult ([facility]) Z68.41       They have been generally well prior to this visit and have had no recent significant illnesses. The patient has had no gastrointestinal issues that would preclude them from proceeding with the surgery they have chosen. Thuy Landry has recently tried a preoperative weight loss program  in addition to seeing a bariatric nutritionist preoperatively. We have discussed on at least one other occasion about the various types of surgical weight loss procedures and they have considered these options after our initial consultation. We have once again discussed these procedures in detail and they have now decided on a surgical procedure. They present today to discuss this and confirm that their evaluation pre operatively is acceptable to continue with surgery. The patient desires laparoscopic sleeve gastrectomy for surgical weight loss. The patients goal weight is 220lb.  (this represents a BMI of 27)    These goals are consistent with expected outcomes of their desired operation. his Medical goals are resolution of these health issues. Patient Active Problem List    Diagnosis Date Noted    History of removal of laparoscopic gastric banding device 10/12/2017    BMI 40.0-44.9, adult ([facility] [address] 10/12/2017    H/O laparoscopic adjustable gastric banding 08/10/2016    H/O lumbosacral spine surgery 08/10/2016    S/P appendectomy 08/10/2016    SHAW (nonalcoholic steatohepatitis) 11/07/2013    History of atrial fibrillation     Depression     Atrial fibrillation ([facility] [address] 11/19/2012    Morbid obesity ([facility] [address] 11/19/2012    High triglycerides 08/01/2012    Sleep apnea 08/01/2012    Chronic back pain 08/01/2012    Hypertension      Past Surgical History:   Procedure Laterality Date    CARDIAC CATHETERIZATION  1/2013    HX APPENDECTOMY  1990    HX HEENT      UPPP    HX LUMBAR DISKECTOMY      HX ORTHOPAEDIC      L1-S5 diskectomy    HX SHOULDER ARTHROSCOPY Left 2017    LAP, PLACE ADJUST GASTR BAND      removed 2017      Social History   Substance Use Topics    Smoking status: Never Smoker    Smokeless tobacco: Never Used    Alcohol use 3.0 oz/week     6 Cans of beer per week      Family History   Problem Relation Age of Onset    Hypertension Mother     Diabetes Mother     Heart Disease Father       Current Outpatient Prescriptions   Medication Sig Dispense Refill    oxyCODONE-acetaminophen (PERCOCET) 5-325 mg per tablet Take 1 Tab by mouth every four (4) hours as needed for Pain. Max Daily Amount: 6 Tabs. 30 Tab 0    Omeprazole delayed release (PRILOSEC D/R) 20 mg tablet Take 1 Tab by mouth daily. OTC 30 Tab 1    METFORMIN HCL (METFORMIN PO) Take 500 mg by mouth two (2) times a day. Indications: Pre DM      ARIPiprazole (ABILIFY) 5 mg tablet Take 5 mg by mouth daily. Indications: anxiety and depression      potassium chloride (KLOR-CON M20) 20 mEq tablet Take 20 mEq by mouth daily.       temazepam (RESTORIL) 30 mg capsule Take 30 mg by mouth nightly as needed for Sleep.  KRILL/OM3/DHA/EPA/OM6/LIP/ASTX (KRILL OIL, OMEGA 3 AND 6, PO) Take  by mouth daily.  cholecalciferol (VITAMIN D3) 1,000 unit cap Take 1,000 Units by mouth daily.  TESTOSTERONE (FORTESTA TD) 4 Pump(s) by TransDERmal route daily. Indications: Low T      DOFETILIDE (TIKOSYN PO) Take 0.5 mg by mouth two (2) times a day. A-Fib      multivitamin (ONE A DAY) tablet Take 1 Tab by mouth daily.  cetirizine (ZYRTEC) 10 mg tablet Take 10 mg by mouth daily. Indications: Allergic Rhinitis      buPROPion SR (WELLBUTRIN SR) 150 mg SR tablet Take 300 mg by mouth daily.  300 XR  Indications: ANXIETY WITH DEPRESSION       No Known Allergies     Review of Systems:     General - No history or complaints of unexpected fever, chills, or weight loss  Head/Neck - No history or complaints of headache, diplopia, dysphagia, hearing loss  Cardiac - No history or complaints of chest pain, palpitations, murmur, or shortness of breath  Pulmonary - No history or complaints of shortness of breath, productive cough, hemoptysis  Gastrointestinal - No history or complaints of reflux,  abdominal pain, obstipation/constipation, blood per rectum  Genitourinary - No history or complaints of hematuria/dysuria, stress urinary incontinence symptoms, or renal lithiasis  Musculoskeletal - No history or complaints of joint pain or muscular weakness  Hematologic - No history or complaints of bleeding disorders, blood transfusions, sickle cell anemia  Neurologic - No history or complaints of  migraine headaches, seizure activity, syncopal episodes, TIA or stroke  Integumentary - No history or complaints of rashes, abnormal nevi, skin cancer    Objective:     Visit Vitals    /68 (BP 1 Location: Left arm, BP Patient Position: Sitting)    Pulse 70    Resp 16    Ht 6' 5\" (1.956 m)    Wt (!) 160.1 kg (353 lb)    SpO2 99%    BMI 41.86 kg/m2       Physical Examination: General appearance - alert, well appearing, and in no distress  Mental status - alert, oriented to person, place, and time  Eyes - pupils equal and reactive, extraocular eye movements intact  Ears - bilateral TM's and external ear canals normal  Nose - normal and patent, no erythema, discharge or polyps  Mouth - mucous membranes moist, pharynx normal without lesions  Neck - supple, no significant adenopathy  Lymphatics - no palpable lymphadenopathy, no hepatosplenomegaly  Chest - clear to auscultation, no wheezes, rales or rhonchi, symmetric air entry  Heart - normal rate, regular rhythm, normal S1, S2, no murmurs, rubs, clicks or gallops  Abdomen - soft, nontender, nondistended, no masses or organomegaly  Back exam - full range of motion, no tenderness, palpable spasm or pain on motion  Neurological - alert, oriented, normal speech, no focal findings or movement disorder noted  Musculoskeletal - no joint tenderness, deformity or swelling  Extremities - peripheral pulses normal, no pedal edema, no clubbing or cyanosis  Skin - normal coloration and turgor, no rashes, no suspicious skin lesions noted    Labs / Preoperative Evaluation:        Recent Results (from the past 1008 hour(s))   EKG, 12 LEAD, INITIAL    Collection Time: 03/26/18 12:04 PM   Result Value Ref Range    Ventricular Rate 56 BPM    Atrial Rate 56 BPM    P-R Interval 182 ms    QRS Duration 88 ms    Q-T Interval 420 ms    QTC Calculation (Bezet) 405 ms    Calculated P Axis 53 degrees    Calculated R Axis 58 degrees    Calculated T Axis 8 degrees    Diagnosis       Sinus bradycardia  Otherwise normal ECG  When compared with ECG of 18-SEP-2017 11:23,  No significant change was found     CBC WITH AUTOMATED DIFF    Collection Time: 03/26/18 12:16 PM   Result Value Ref Range    WBC 6.3 4.6 - 13.2 K/uL    RBC 5.33 4.70 - 5.50 M/uL    HGB 17.5 (H) 13.0 - 16.0 g/dL    HCT 49.7 (H) 36.0 - 48.0 %    MCV 93.2 74.0 - 97.0 FL    MCH 32.8 24.0 - 34.0 PG    MCHC 35.2 31.0 - 37.0 g/dL RDW 13.3 11.6 - 14.5 %    PLATELET 412 889 - 423 K/uL    MPV 10.7 9.2 - 11.8 FL    NEUTROPHILS 49 40 - 73 %    LYMPHOCYTES 36 21 - 52 %    MONOCYTES 12 (H) 3 - 10 %    EOSINOPHILS 2 0 - 5 %    BASOPHILS 1 0 - 2 %    ABS. NEUTROPHILS 3.1 1.8 - 8.0 K/UL    ABS. LYMPHOCYTES 2.3 0.9 - 3.6 K/UL    ABS. MONOCYTES 0.8 0.05 - 1.2 K/UL    ABS. EOSINOPHILS 0.2 0.0 - 0.4 K/UL    ABS. BASOPHILS 0.0 0.0 - 0.06 K/UL    DF AUTOMATED         Assessment:     Morbid obesity with comorbidity    Plan:     laparoscopic sleeve gastrectomy    This is a 66 y.o. male with a BMI of Body mass index is 41.86 kg/(m^2). and the weight-related co-morbidties as noted above. Ajay Stokes meets the NIH criteria for bariatric surgery based upon the BMI of Body mass index is 41.86 kg/(m^2). and multiple weight-related co-morbidties. Ajay Stokes has elected laparoscopic sleeve gastrectomy as his intervention of choice for treatment of morbid obestiy through surgical means secondary to its safety profile, rapid return to work  and decreases in operative risks over gastric bypass. In the office today, following Placido's history and physical examination, a 40 minute discussion regarding the anatomic alterations for the laparoscopic sleeve gastrectomy was undertaken. The dietary expectations and the patient  dependent factors for success were thoroughly discussed, to include the need for interval follow-up and long-term dietary changes associated with success. The possible complications of the sleeve gastrectomy  were also discussed, to include;death, DVT/PE, staple line leak, bleeding, stricture formation, infection, nutritional deficiencies and sleeve dilation. Specific weight related outcomes for success were also discussed with an emphasis on careful and close follow-up with the first year and eating behavior modification as the baseline and cyclical hunger return.   The patient expressed an understanding of the above factors, and his questions were answered in their entirety. In addition, the patient attended a 1.5 hour power point seminar regarding obesity, surgical weight loss including, adjustable gastric band, gastric bypass, and sleeve gastrectomy. This discussion contrasted the different surgical techniques, mechanisms of actions and expected outcomes, and surgical and medical risks associated with each procedure. During this seminar, there was a long question and answer session where each questions was answered until there were no additional questions. Today, the patient had all of his questions answered and the decision was made today that the patient's preoperative evaluation is acceptable for them  to proceed with bariatric surgery  choosing the sleeve gastrectomy as his surgical option. The patient understands the plan of action    He understands this is considered revision surgery and as such the above mentioned risks are elevated    Known polycythemia in pt with testosterone supplements    Secondary Diagnoses:     DVT / Pulmonary Embolus Risk - The patient is at a higher risk for post operative DVT / pulmonary embolus secondary to their morbid obese status, relative sedentary lifestyle, and impending general anesthetic. We will plan to use anticoagulation therapy pre and post operative as well as  pneumatic compression devices and encourage ambulation once on the hospital nursing floor. The need for possible at home anticoagulation therapy has also been discussed and any decision on this matter will be made during post operative evaluations. The patient understands that their efforts at ambulation are of vital importance to reduce the risk of this complication thus placing significant burden on them as to the prevention of such issues. Signs and symptoms of DVT / PE have been discussed with the patient and they have been instructed to call the office if any these occur in the \"at home\" post op phase.     Obstructive Sleep Apnea -The patient understands the association of sleep apnea and obesity and the additional risk that it caries related to post surgical complications. We will have the patient bring their CPAP machine to the hospital for use both postoperatively in the PACU and on the floor at its appropriate setting.  We will have them continue using it while at home after surgery and follow up with their pulmonologist 6 months after to be retested to see if it can be discontinued at that time period. Hypertension - The patient has a clear understanding of how weight loss improves hypertension as a whole, but also they understand that there is a significant genetic component to this disease process. We will monitor the patients blood pressure while in the hospital and the plan would be to continue those medications postoperatively.  If a diuretic is being used we will stop them on discharge to prevent dehydration particularly with the sleeve gastrectomy and the gastric bypass procedures.  They will be instructed to monitor their blood pressure postoperatively while at home and notify their primary care physician in the event of any significantly high or uncharacteristic readings. Hyperlipidemia - The patient understands that studies show that almost all patient will realize an improvement in their lipid profile with weight loss that occurs with these procedures. They however also understand that hyperlipidemia is a multifactorial disease particularly as it pertains to their genetic background and that there is no guarantee toward cure  of this issue. We will resume their medications immediately postoperatively as this tends to decrease any post operative cardiac events.  The patient will follow up with their family physician in the postoperative period with plans to repeat their lipid panel 2-3 month postoperative for potential adjustment or removal of these medications.     Signed By: Michelle Lutz MD     March 26, 2018

## 2018-03-28 LAB
ATRIAL RATE: 56 BPM
CALCULATED P AXIS, ECG09: 53 DEGREES
CALCULATED R AXIS, ECG10: 58 DEGREES
CALCULATED T AXIS, ECG11: 8 DEGREES
DIAGNOSIS, 93000: NORMAL
P-R INTERVAL, ECG05: 182 MS
Q-T INTERVAL, ECG07: 420 MS
QRS DURATION, ECG06: 88 MS
QTC CALCULATION (BEZET), ECG08: 405 MS
VENTRICULAR RATE, ECG03: 56 BPM

## 2018-04-05 ENCOUNTER — ANESTHESIA EVENT (OUTPATIENT)
Dept: SURGERY | Age: 58
DRG: 621 | End: 2018-04-05
Payer: OTHER GOVERNMENT

## 2018-04-05 ENCOUNTER — ANESTHESIA (OUTPATIENT)
Dept: SURGERY | Age: 58
DRG: 621 | End: 2018-04-05
Payer: OTHER GOVERNMENT

## 2018-04-05 ENCOUNTER — HOSPITAL ENCOUNTER (INPATIENT)
Age: 58
LOS: 1 days | Discharge: HOME | DRG: 621 | End: 2018-04-06
Attending: SPECIALIST | Admitting: SPECIALIST
Payer: OTHER GOVERNMENT

## 2018-04-05 PROBLEM — E66.01 MORBID OBESITY WITH BMI OF 45.0-49.9, ADULT (HCC): Status: ACTIVE | Noted: 2018-04-05

## 2018-04-05 LAB
GLUCOSE BLD STRIP.AUTO-MCNC: 103 MG/DL (ref 70–110)
GLUCOSE BLD STRIP.AUTO-MCNC: 126 MG/DL (ref 70–110)

## 2018-04-05 PROCEDURE — 74011000258 HC RX REV CODE- 258: Performed by: SPECIALIST

## 2018-04-05 PROCEDURE — 77030033200 HC PRT CLSR CRTR THOMP COOP -C: Performed by: SPECIALIST

## 2018-04-05 PROCEDURE — 77030008683 HC TU ET CUF COVD -A: Performed by: ANESTHESIOLOGY

## 2018-04-05 PROCEDURE — 77030008517 HC TBNG INSUF ENDO STOR -B: Performed by: SPECIALIST

## 2018-04-05 PROCEDURE — 0DB78ZX EXCISION OF STOMACH, PYLORUS, VIA NATURAL OR ARTIFICIAL OPENING ENDOSCOPIC, DIAGNOSTIC: ICD-10-PCS | Performed by: SPECIALIST

## 2018-04-05 PROCEDURE — 77030012893: Performed by: SPECIALIST

## 2018-04-05 PROCEDURE — 77030008477 HC STYL SATN SLP COVD -A: Performed by: ANESTHESIOLOGY

## 2018-04-05 PROCEDURE — 74011250636 HC RX REV CODE- 250/636: Performed by: SPECIALIST

## 2018-04-05 PROCEDURE — 77030012407 HC DRN WND BARD -B: Performed by: SPECIALIST

## 2018-04-05 PROCEDURE — 76210000016 HC OR PH I REC 1 TO 1.5 HR: Performed by: SPECIALIST

## 2018-04-05 PROCEDURE — 77030020255 HC SOL INJ LR 1000ML BG: Performed by: SPECIALIST

## 2018-04-05 PROCEDURE — 77030006643: Performed by: ANESTHESIOLOGY

## 2018-04-05 PROCEDURE — 88342 IMHCHEM/IMCYTCHM 1ST ANTB: CPT | Performed by: SPECIALIST

## 2018-04-05 PROCEDURE — 88305 TISSUE EXAM BY PATHOLOGIST: CPT | Performed by: SPECIALIST

## 2018-04-05 PROCEDURE — 77030008439 HC STPL REINF BAXT -C: Performed by: SPECIALIST

## 2018-04-05 PROCEDURE — 77030034154 HC SHR COAG HARM ACE J&J -F: Performed by: SPECIALIST

## 2018-04-05 PROCEDURE — 77030002916 HC SUT ETHLN J&J -A: Performed by: SPECIALIST

## 2018-04-05 PROCEDURE — 74011250637 HC RX REV CODE- 250/637: Performed by: SPECIALIST

## 2018-04-05 PROCEDURE — 88307 TISSUE EXAM BY PATHOLOGIST: CPT | Performed by: SPECIALIST

## 2018-04-05 PROCEDURE — 77030008603 HC TRCR ENDOSC EPATH J&J -C: Performed by: SPECIALIST

## 2018-04-05 PROCEDURE — 77030036598 HC CARTDRG STPL RELD ECHELON FLX J&J -D: Performed by: SPECIALIST

## 2018-04-05 PROCEDURE — 77030020782 HC GWN BAIR PAWS FLX 3M -B: Performed by: SPECIALIST

## 2018-04-05 PROCEDURE — 77030003580 HC NDL INSUF VERES J&J -B: Performed by: SPECIALIST

## 2018-04-05 PROCEDURE — 74011250636 HC RX REV CODE- 250/636

## 2018-04-05 PROCEDURE — 77030009426 HC FCPS BIOP ENDOSC BSC -B: Performed by: SPECIALIST

## 2018-04-05 PROCEDURE — 77030002966 HC SUT PDS J&J -A: Performed by: SPECIALIST

## 2018-04-05 PROCEDURE — 77030002933 HC SUT MCRYL J&J -A: Performed by: SPECIALIST

## 2018-04-05 PROCEDURE — 0DNW4ZZ RELEASE PERITONEUM, PERCUTANEOUS ENDOSCOPIC APPROACH: ICD-10-PCS | Performed by: SPECIALIST

## 2018-04-05 PROCEDURE — 0DB64Z3 EXCISION OF STOMACH, PERCUTANEOUS ENDOSCOPIC APPROACH, VERTICAL: ICD-10-PCS | Performed by: SPECIALIST

## 2018-04-05 PROCEDURE — 77030034029 HC SLV GASTRCTMY CAL SYS DISP BOEH -C: Performed by: SPECIALIST

## 2018-04-05 PROCEDURE — 0FB24ZX EXCISION OF LEFT LOBE LIVER, PERCUTANEOUS ENDOSCOPIC APPROACH, DIAGNOSTIC: ICD-10-PCS | Performed by: SPECIALIST

## 2018-04-05 PROCEDURE — 82962 GLUCOSE BLOOD TEST: CPT

## 2018-04-05 PROCEDURE — 77030018836 HC SOL IRR NACL ICUM -A: Performed by: SPECIALIST

## 2018-04-05 PROCEDURE — 74011000250 HC RX REV CODE- 250: Performed by: SPECIALIST

## 2018-04-05 PROCEDURE — 77030027876 HC STPLR ENDOSC FLX PWR J&J -G1: Performed by: SPECIALIST

## 2018-04-05 PROCEDURE — 74011250636 HC RX REV CODE- 250/636: Performed by: ANESTHESIOLOGY

## 2018-04-05 PROCEDURE — 74011000250 HC RX REV CODE- 250

## 2018-04-05 PROCEDURE — 65270000029 HC RM PRIVATE

## 2018-04-05 PROCEDURE — 77030022585 HC SEAL FBRN EVICEL J&J -F: Performed by: SPECIALIST

## 2018-04-05 PROCEDURE — 76010000131 HC OR TIME 2 TO 2.5 HR: Performed by: SPECIALIST

## 2018-04-05 PROCEDURE — 77030002912 HC SUT ETHBND J&J -A: Performed by: SPECIALIST

## 2018-04-05 PROCEDURE — 76060000035 HC ANESTHESIA 2 TO 2.5 HR: Performed by: SPECIALIST

## 2018-04-05 PROCEDURE — 77030013567 HC DRN WND RESERV BARD -A: Performed by: SPECIALIST

## 2018-04-05 PROCEDURE — 77010033678 HC OXYGEN DAILY

## 2018-04-05 PROCEDURE — 77030010515 HC APPL ENDOCLP LIG J&J -B: Performed by: SPECIALIST

## 2018-04-05 PROCEDURE — 77030032490 HC SLV COMPR SCD KNE COVD -B: Performed by: SPECIALIST

## 2018-04-05 PROCEDURE — 88313 SPECIAL STAINS GROUP 2: CPT | Performed by: SPECIALIST

## 2018-04-05 DEVICE — IMPLANTABLE DEVICE: Type: IMPLANTABLE DEVICE | Site: STOMACH | Status: FUNCTIONAL

## 2018-04-05 RX ORDER — ONDANSETRON 2 MG/ML
4 INJECTION INTRAMUSCULAR; INTRAVENOUS ONCE
Status: COMPLETED | OUTPATIENT
Start: 2018-04-05 | End: 2018-04-05

## 2018-04-05 RX ORDER — FLUMAZENIL 0.1 MG/ML
0.2 INJECTION INTRAVENOUS
Status: DISCONTINUED | OUTPATIENT
Start: 2018-04-05 | End: 2018-04-05 | Stop reason: SDUPTHER

## 2018-04-05 RX ORDER — ONDANSETRON 2 MG/ML
4 INJECTION INTRAMUSCULAR; INTRAVENOUS
Status: DISCONTINUED | OUTPATIENT
Start: 2018-04-05 | End: 2018-04-06 | Stop reason: HOSPADM

## 2018-04-05 RX ORDER — LIDOCAINE HYDROCHLORIDE 20 MG/ML
INJECTION, SOLUTION EPIDURAL; INFILTRATION; INTRACAUDAL; PERINEURAL AS NEEDED
Status: DISCONTINUED | OUTPATIENT
Start: 2018-04-05 | End: 2018-04-05 | Stop reason: HOSPADM

## 2018-04-05 RX ORDER — CEFAZOLIN SODIUM 2 G/50ML
2 SOLUTION INTRAVENOUS EVERY 8 HOURS
Status: DISCONTINUED | OUTPATIENT
Start: 2018-04-05 | End: 2018-04-05 | Stop reason: RX

## 2018-04-05 RX ORDER — ONDANSETRON 2 MG/ML
INJECTION INTRAMUSCULAR; INTRAVENOUS AS NEEDED
Status: DISCONTINUED | OUTPATIENT
Start: 2018-04-05 | End: 2018-04-05 | Stop reason: HOSPADM

## 2018-04-05 RX ORDER — ENOXAPARIN SODIUM 100 MG/ML
40 INJECTION SUBCUTANEOUS ONCE
Status: COMPLETED | OUTPATIENT
Start: 2018-04-05 | End: 2018-04-05

## 2018-04-05 RX ORDER — NALOXONE HYDROCHLORIDE 0.4 MG/ML
0.1 INJECTION, SOLUTION INTRAMUSCULAR; INTRAVENOUS; SUBCUTANEOUS AS NEEDED
Status: DISCONTINUED | OUTPATIENT
Start: 2018-04-05 | End: 2018-04-05 | Stop reason: SDUPTHER

## 2018-04-05 RX ORDER — PROPOFOL 10 MG/ML
INJECTION, EMULSION INTRAVENOUS AS NEEDED
Status: DISCONTINUED | OUTPATIENT
Start: 2018-04-05 | End: 2018-04-05 | Stop reason: HOSPADM

## 2018-04-05 RX ORDER — MAGNESIUM SULFATE 100 %
4 CRYSTALS MISCELLANEOUS AS NEEDED
Status: DISCONTINUED | OUTPATIENT
Start: 2018-04-05 | End: 2018-04-05 | Stop reason: HOSPADM

## 2018-04-05 RX ORDER — MORPHINE SULFATE 4 MG/ML
8 INJECTION INTRAVENOUS
Status: DISCONTINUED | OUTPATIENT
Start: 2018-04-05 | End: 2018-04-06

## 2018-04-05 RX ORDER — SODIUM CHLORIDE, SODIUM LACTATE, POTASSIUM CHLORIDE, CALCIUM CHLORIDE 600; 310; 30; 20 MG/100ML; MG/100ML; MG/100ML; MG/100ML
125 INJECTION, SOLUTION INTRAVENOUS CONTINUOUS
Status: DISCONTINUED | OUTPATIENT
Start: 2018-04-05 | End: 2018-04-05

## 2018-04-05 RX ORDER — ACETAMINOPHEN 10 MG/ML
1000 INJECTION, SOLUTION INTRAVENOUS EVERY 6 HOURS
Status: COMPLETED | OUTPATIENT
Start: 2018-04-05 | End: 2018-04-06

## 2018-04-05 RX ORDER — GLYCOPYRROLATE 0.2 MG/ML
INJECTION INTRAMUSCULAR; INTRAVENOUS AS NEEDED
Status: DISCONTINUED | OUTPATIENT
Start: 2018-04-05 | End: 2018-04-05 | Stop reason: HOSPADM

## 2018-04-05 RX ORDER — SODIUM CHLORIDE 0.9 % (FLUSH) 0.9 %
5-10 SYRINGE (ML) INJECTION AS NEEDED
Status: DISCONTINUED | OUTPATIENT
Start: 2018-04-05 | End: 2018-04-05 | Stop reason: HOSPADM

## 2018-04-05 RX ORDER — DEXTROSE 50 % IN WATER (D50W) INTRAVENOUS SYRINGE
25-50 AS NEEDED
Status: DISCONTINUED | OUTPATIENT
Start: 2018-04-05 | End: 2018-04-05 | Stop reason: SDUPTHER

## 2018-04-05 RX ORDER — ENOXAPARIN SODIUM 100 MG/ML
40 INJECTION SUBCUTANEOUS EVERY 12 HOURS
Status: DISCONTINUED | OUTPATIENT
Start: 2018-04-06 | End: 2018-04-06 | Stop reason: HOSPADM

## 2018-04-05 RX ORDER — FAMOTIDINE 20 MG/50ML
20 INJECTION, SOLUTION INTRAVENOUS ONCE
Status: COMPLETED | OUTPATIENT
Start: 2018-04-05 | End: 2018-04-05

## 2018-04-05 RX ORDER — FENTANYL CITRATE 50 UG/ML
50 INJECTION, SOLUTION INTRAMUSCULAR; INTRAVENOUS AS NEEDED
Status: DISCONTINUED | OUTPATIENT
Start: 2018-04-05 | End: 2018-04-05 | Stop reason: SDUPTHER

## 2018-04-05 RX ORDER — FENTANYL CITRATE 50 UG/ML
INJECTION, SOLUTION INTRAMUSCULAR; INTRAVENOUS AS NEEDED
Status: DISCONTINUED | OUTPATIENT
Start: 2018-04-05 | End: 2018-04-05 | Stop reason: HOSPADM

## 2018-04-05 RX ORDER — ACETAMINOPHEN 10 MG/ML
1000 INJECTION, SOLUTION INTRAVENOUS ONCE
Status: COMPLETED | OUTPATIENT
Start: 2018-04-05 | End: 2018-04-05

## 2018-04-05 RX ORDER — NYSTATIN 100000 [USP'U]/ML
500000 SUSPENSION ORAL
Status: COMPLETED | OUTPATIENT
Start: 2018-04-05 | End: 2018-04-05

## 2018-04-05 RX ORDER — HYDROMORPHONE HYDROCHLORIDE 2 MG/ML
0.5 INJECTION, SOLUTION INTRAMUSCULAR; INTRAVENOUS; SUBCUTANEOUS
Status: DISCONTINUED | OUTPATIENT
Start: 2018-04-05 | End: 2018-04-05 | Stop reason: RX

## 2018-04-05 RX ORDER — ROCURONIUM BROMIDE 10 MG/ML
INJECTION, SOLUTION INTRAVENOUS AS NEEDED
Status: DISCONTINUED | OUTPATIENT
Start: 2018-04-05 | End: 2018-04-05 | Stop reason: HOSPADM

## 2018-04-05 RX ORDER — METOCLOPRAMIDE HYDROCHLORIDE 5 MG/ML
INJECTION INTRAMUSCULAR; INTRAVENOUS AS NEEDED
Status: DISCONTINUED | OUTPATIENT
Start: 2018-04-05 | End: 2018-04-05 | Stop reason: HOSPADM

## 2018-04-05 RX ORDER — KETOROLAC TROMETHAMINE 30 MG/ML
30 INJECTION, SOLUTION INTRAMUSCULAR; INTRAVENOUS EVERY 6 HOURS
Status: DISCONTINUED | OUTPATIENT
Start: 2018-04-05 | End: 2018-04-06 | Stop reason: HOSPADM

## 2018-04-05 RX ORDER — KETAMINE HYDROCHLORIDE 10 MG/ML
INJECTION, SOLUTION INTRAMUSCULAR; INTRAVENOUS AS NEEDED
Status: DISCONTINUED | OUTPATIENT
Start: 2018-04-05 | End: 2018-04-05 | Stop reason: HOSPADM

## 2018-04-05 RX ORDER — DEXMEDETOMIDINE HYDROCHLORIDE 4 UG/ML
INJECTION, SOLUTION INTRAVENOUS AS NEEDED
Status: DISCONTINUED | OUTPATIENT
Start: 2018-04-05 | End: 2018-04-05 | Stop reason: HOSPADM

## 2018-04-05 RX ORDER — MAGNESIUM SULFATE 100 %
4 CRYSTALS MISCELLANEOUS AS NEEDED
Status: DISCONTINUED | OUTPATIENT
Start: 2018-04-05 | End: 2018-04-05 | Stop reason: SDUPTHER

## 2018-04-05 RX ORDER — BUPIVACAINE HYDROCHLORIDE AND EPINEPHRINE 2.5; 5 MG/ML; UG/ML
INJECTION, SOLUTION EPIDURAL; INFILTRATION; INTRACAUDAL; PERINEURAL AS NEEDED
Status: DISCONTINUED | OUTPATIENT
Start: 2018-04-05 | End: 2018-04-05 | Stop reason: HOSPADM

## 2018-04-05 RX ORDER — DIPHENHYDRAMINE HYDROCHLORIDE 50 MG/ML
25 INJECTION, SOLUTION INTRAMUSCULAR; INTRAVENOUS
Status: DISCONTINUED | OUTPATIENT
Start: 2018-04-05 | End: 2018-04-06 | Stop reason: HOSPADM

## 2018-04-05 RX ORDER — MIDAZOLAM HYDROCHLORIDE 1 MG/ML
INJECTION, SOLUTION INTRAMUSCULAR; INTRAVENOUS AS NEEDED
Status: DISCONTINUED | OUTPATIENT
Start: 2018-04-05 | End: 2018-04-05 | Stop reason: HOSPADM

## 2018-04-05 RX ORDER — HYDROMORPHONE HYDROCHLORIDE 2 MG/ML
0.5 INJECTION, SOLUTION INTRAMUSCULAR; INTRAVENOUS; SUBCUTANEOUS
Status: DISCONTINUED | OUTPATIENT
Start: 2018-04-05 | End: 2018-04-05 | Stop reason: HOSPADM

## 2018-04-05 RX ORDER — NEOSTIGMINE METHYLSULFATE 5 MG/5 ML
SYRINGE (ML) INTRAVENOUS AS NEEDED
Status: DISCONTINUED | OUTPATIENT
Start: 2018-04-05 | End: 2018-04-05 | Stop reason: HOSPADM

## 2018-04-05 RX ORDER — FENTANYL CITRATE 50 UG/ML
50 INJECTION, SOLUTION INTRAMUSCULAR; INTRAVENOUS AS NEEDED
Status: DISCONTINUED | OUTPATIENT
Start: 2018-04-05 | End: 2018-04-05 | Stop reason: HOSPADM

## 2018-04-05 RX ORDER — DEXTROSE 50 % IN WATER (D50W) INTRAVENOUS SYRINGE
25-50 AS NEEDED
Status: DISCONTINUED | OUTPATIENT
Start: 2018-04-05 | End: 2018-04-05 | Stop reason: HOSPADM

## 2018-04-05 RX ORDER — NALOXONE HYDROCHLORIDE 0.4 MG/ML
0.1 INJECTION, SOLUTION INTRAMUSCULAR; INTRAVENOUS; SUBCUTANEOUS AS NEEDED
Status: DISCONTINUED | OUTPATIENT
Start: 2018-04-05 | End: 2018-04-05 | Stop reason: HOSPADM

## 2018-04-05 RX ORDER — SODIUM CHLORIDE, SODIUM LACTATE, POTASSIUM CHLORIDE, CALCIUM CHLORIDE 600; 310; 30; 20 MG/100ML; MG/100ML; MG/100ML; MG/100ML
150 INJECTION, SOLUTION INTRAVENOUS CONTINUOUS
Status: DISCONTINUED | OUTPATIENT
Start: 2018-04-05 | End: 2018-04-06 | Stop reason: HOSPADM

## 2018-04-05 RX ORDER — FLUMAZENIL 0.1 MG/ML
0.2 INJECTION INTRAVENOUS
Status: DISCONTINUED | OUTPATIENT
Start: 2018-04-05 | End: 2018-04-05 | Stop reason: HOSPADM

## 2018-04-05 RX ADMIN — LIDOCAINE HYDROCHLORIDE 100 MG: 20 INJECTION, SOLUTION EPIDURAL; INFILTRATION; INTRACAUDAL; PERINEURAL at 12:31

## 2018-04-05 RX ADMIN — METOCLOPRAMIDE HYDROCHLORIDE 10 MG: 5 INJECTION INTRAMUSCULAR; INTRAVENOUS at 14:17

## 2018-04-05 RX ADMIN — FENTANYL CITRATE 100 MCG: 50 INJECTION, SOLUTION INTRAMUSCULAR; INTRAVENOUS at 12:28

## 2018-04-05 RX ADMIN — ROCURONIUM BROMIDE 50 MG: 10 INJECTION, SOLUTION INTRAVENOUS at 12:31

## 2018-04-05 RX ADMIN — DEXMEDETOMIDINE HYDROCHLORIDE 8 MCG: 4 INJECTION, SOLUTION INTRAVENOUS at 13:10

## 2018-04-05 RX ADMIN — ONDANSETRON 4 MG: 2 INJECTION INTRAMUSCULAR; INTRAVENOUS at 21:00

## 2018-04-05 RX ADMIN — DEXMEDETOMIDINE HYDROCHLORIDE 12 MCG: 4 INJECTION, SOLUTION INTRAVENOUS at 12:49

## 2018-04-05 RX ADMIN — ONDANSETRON 4 MG: 2 INJECTION INTRAMUSCULAR; INTRAVENOUS at 15:21

## 2018-04-05 RX ADMIN — SODIUM CHLORIDE, SODIUM LACTATE, POTASSIUM CHLORIDE, AND CALCIUM CHLORIDE 125 ML/HR: 600; 310; 30; 20 INJECTION, SOLUTION INTRAVENOUS at 11:46

## 2018-04-05 RX ADMIN — MIDAZOLAM HYDROCHLORIDE 2 MG: 1 INJECTION, SOLUTION INTRAMUSCULAR; INTRAVENOUS at 12:21

## 2018-04-05 RX ADMIN — SODIUM CHLORIDE, SODIUM LACTATE, POTASSIUM CHLORIDE, AND CALCIUM CHLORIDE 150 ML/HR: 600; 310; 30; 20 INJECTION, SOLUTION INTRAVENOUS at 18:16

## 2018-04-05 RX ADMIN — SODIUM CHLORIDE, SODIUM LACTATE, POTASSIUM CHLORIDE, AND CALCIUM CHLORIDE: 600; 310; 30; 20 INJECTION, SOLUTION INTRAVENOUS at 14:04

## 2018-04-05 RX ADMIN — KETAMINE HYDROCHLORIDE 30 MG: 10 INJECTION, SOLUTION INTRAMUSCULAR; INTRAVENOUS at 13:10

## 2018-04-05 RX ADMIN — ROCURONIUM BROMIDE 20 MG: 10 INJECTION, SOLUTION INTRAVENOUS at 13:19

## 2018-04-05 RX ADMIN — ENOXAPARIN SODIUM 40 MG: 40 INJECTION SUBCUTANEOUS at 11:47

## 2018-04-05 RX ADMIN — PROPOFOL 200 MG: 10 INJECTION, EMULSION INTRAVENOUS at 12:36

## 2018-04-05 RX ADMIN — PROMETHAZINE HYDROCHLORIDE 12.5 MG: 25 INJECTION, SOLUTION INTRAMUSCULAR; INTRAVENOUS at 18:16

## 2018-04-05 RX ADMIN — GLYCOPYRROLATE 0.2 MG: 0.2 INJECTION INTRAMUSCULAR; INTRAVENOUS at 12:21

## 2018-04-05 RX ADMIN — DEXMEDETOMIDINE HYDROCHLORIDE 12 MCG: 4 INJECTION, SOLUTION INTRAVENOUS at 12:57

## 2018-04-05 RX ADMIN — ACETAMINOPHEN 1000 MG: 10 INJECTION, SOLUTION INTRAVENOUS at 19:00

## 2018-04-05 RX ADMIN — FENTANYL CITRATE 50 MCG: 50 INJECTION, SOLUTION INTRAMUSCULAR; INTRAVENOUS at 15:21

## 2018-04-05 RX ADMIN — Medication 3 G: at 12:43

## 2018-04-05 RX ADMIN — KETAMINE HYDROCHLORIDE 10 MG: 10 INJECTION, SOLUTION INTRAMUSCULAR; INTRAVENOUS at 14:00

## 2018-04-05 RX ADMIN — FENTANYL CITRATE 50 MCG: 50 INJECTION, SOLUTION INTRAMUSCULAR; INTRAVENOUS at 15:04

## 2018-04-05 RX ADMIN — SODIUM CHLORIDE, SODIUM LACTATE, POTASSIUM CHLORIDE, AND CALCIUM CHLORIDE 125 ML/HR: 600; 310; 30; 20 INJECTION, SOLUTION INTRAVENOUS at 15:40

## 2018-04-05 RX ADMIN — Medication 3 G: at 20:53

## 2018-04-05 RX ADMIN — MORPHINE SULFATE 8 MG: 4 INJECTION INTRAVENOUS at 20:53

## 2018-04-05 RX ADMIN — ACETAMINOPHEN 1000 MG: 10 INJECTION, SOLUTION INTRAVENOUS at 12:21

## 2018-04-05 RX ADMIN — PROPOFOL 300 MG: 10 INJECTION, EMULSION INTRAVENOUS at 12:31

## 2018-04-05 RX ADMIN — FAMOTIDINE 20 MG: 20 INJECTION, SOLUTION INTRAVENOUS at 11:54

## 2018-04-05 RX ADMIN — SODIUM CHLORIDE, SODIUM LACTATE, POTASSIUM CHLORIDE, AND CALCIUM CHLORIDE 150 ML/HR: 600; 310; 30; 20 INJECTION, SOLUTION INTRAVENOUS at 17:35

## 2018-04-05 RX ADMIN — NYSTATIN 500000 UNITS: 100000 SUSPENSION ORAL at 11:46

## 2018-04-05 RX ADMIN — GLYCOPYRROLATE 0.7 MG: 0.2 INJECTION INTRAMUSCULAR; INTRAVENOUS at 14:23

## 2018-04-05 RX ADMIN — KETOROLAC TROMETHAMINE 30 MG: 30 INJECTION, SOLUTION INTRAMUSCULAR at 17:34

## 2018-04-05 RX ADMIN — Medication 4 MG: at 14:23

## 2018-04-05 RX ADMIN — SODIUM CHLORIDE, SODIUM LACTATE, POTASSIUM CHLORIDE, AND CALCIUM CHLORIDE 125 ML/HR: 600; 310; 30; 20 INJECTION, SOLUTION INTRAVENOUS at 11:54

## 2018-04-05 RX ADMIN — SODIUM CHLORIDE, SODIUM LACTATE, POTASSIUM CHLORIDE, AND CALCIUM CHLORIDE: 600; 310; 30; 20 INJECTION, SOLUTION INTRAVENOUS at 12:59

## 2018-04-05 RX ADMIN — ROCURONIUM BROMIDE 10 MG: 10 INJECTION, SOLUTION INTRAVENOUS at 13:43

## 2018-04-05 RX ADMIN — KETAMINE HYDROCHLORIDE 20 MG: 10 INJECTION, SOLUTION INTRAMUSCULAR; INTRAVENOUS at 13:43

## 2018-04-05 RX ADMIN — ONDANSETRON 4 MG: 2 INJECTION INTRAMUSCULAR; INTRAVENOUS at 12:29

## 2018-04-05 NOTE — PERIOP NOTES
TRANSFER - OUT REPORT:    Verbal report given to Michelle Bergman RN(name) on Yulia Pearson  being transferred to [address] [place] [address](unit) for routine progression of care       Report consisted of patients Situation, Background, Assessment and   Recommendations(SBAR). Information from the following report(s) SBAR, Kardex, OR Summary, Procedure Summary, MAR and Recent Results was reviewed with the receiving nurse. Lines:   Peripheral IV 04/05/18 Left Hand (Active)   Site Assessment Clean, dry, & intact 4/5/2018  3:30 PM   Phlebitis Assessment 0 4/5/2018  3:30 PM   Infiltration Assessment 0 4/5/2018  3:30 PM   Dressing Status Clean, dry, & intact 4/5/2018  3:30 PM   Dressing Type Tape;Transparent 4/5/2018  3:30 PM   Hub Color/Line Status Green; Infusing 4/5/2018  3:30 PM   Alcohol Cap Used No 4/5/2018 11:48 AM        Opportunity for questions and clarification was provided.       Patient transported with:   O2 @ 2 liters  Registered Nurse  [facility]

## 2018-04-05 NOTE — IP AVS SNAPSHOT
[facility] 
 
 
 [address] [facility] [address] 
[phone] Patient: Sammy Moe MRN: [id number] ANJEL:0/44/3699 A check ld indicates which time of day the medication should be taken. My Medications CONTINUE taking these medications Instructions Each Dose to Equal  
 Morning Noon Evening Bedtime ABILIFY 5 mg tablet Generic drug:  ARIPiprazole Your last dose was: Your next dose is: Take 5 mg by mouth daily. Indications: anxiety and depression 5 mg  
    
   
   
   
  
 buPROPion  mg SR tablet Commonly known as:  Kyra Fiona Your last dose was: Your next dose is: Take 300 mg by mouth daily. 300 XR  Indications: ANXIETY WITH DEPRESSION  
 300 mg FORTESTA TD Your last dose was: Your next dose is:    
   
   
 4 Pump(s) by TransDERmal route daily. Indications: Low T  
 4 Pump(s)  
    
   
   
   
  
 multivitamin tablet Commonly known as:  ONE A DAY Your last dose was: Your next dose is: Take 1 Tab by mouth daily. 1 Tab Omeprazole delayed release 20 mg tablet Commonly known as:  PRILOSEC D/R Your last dose was: Your next dose is: Take 1 Tab by mouth daily. OTC  
 20 mg  
    
   
   
   
  
 oxyCODONE-acetaminophen 5-325 mg per tablet Commonly known as:  PERCOCET Your last dose was: Your next dose is: Take 1 Tab by mouth every four (4) hours as needed for Pain. Max Daily Amount: 6 Tabs. 1 Tab  
    
   
   
   
  
 temazepam 30 mg capsule Commonly known as:  RESTORIL Your last dose was: Your next dose is: Take 30 mg by mouth nightly as needed for Sleep. 30 mg  
    
   
   
   
  
 TIKOSYN PO Your last dose was: Your next dose is: Take 0.5 mg by mouth two (2) times a day. A-Fib  
 0.5 mg  
    
   
   
   
  
 ZyrTEC 10 mg tablet Generic drug:  cetirizine Your last dose was: Your next dose is: Take 10 mg by mouth daily. Indications: Allergic Rhinitis 10 mg  
    
   
   
   
  
  
STOP taking these medications   
 cholecalciferol 1,000 unit Cap Commonly known as:  VITAMIN D3  
   
  
 KLOR-CON M20 20 mEq tablet Generic drug:  potassium chloride  KRILL OIL (OMEGA 3 AND 6) PO  
   
  
 METFORMIN PO

## 2018-04-05 NOTE — PROGRESS NOTES
Chart reviewed. Pt admitted for an elective surgical procedure. Pt is independent. Please encourage ambulation. No plan of care needs identified. Anticipate pt will be medically stable for discharge within the next 24-48 hours. CM available to assist as needed. Discharge Reassessment Plan:  Low Risk    RRAT Score:  1 - 12     Low Risk Care Transition Interventions:  1. Discharge transition plan:  Physician follow up   2. Involved patient/caregiver in assessment, planning, education and implement of intervention. 3. CM daily patient care huddles/interdisciplinary rounds were completed. 4. PCP/Specialist appointment within 5 days made prior to discharge. Date/Time  5. Facilitated transportation and logistics for follow-up appointments. 6. Handoff to [address] [place] [address] Nurse Navigator or PCP practice. Care Management Interventions  PCP Verified by CM: Yes  Mode of Transport at Discharge:  Other (see comment) (Spouse)  Transition of Care Consult (CM Consult): Discharge Planning  Health Maintenance Reviewed: Yes  Current Support Network: Lives with Spouse  Confirm Follow Up Transport: Self  Discharge Location  Discharge Placement: Home with family assistance

## 2018-04-05 NOTE — IP AVS SNAPSHOT
[address] [address] 
[phone] Patient: Parveen Santos MRN: [id number] ZSS:8/12/[birth year]31 About your hospitalization You were admitted on:  April 5, 2018 You last received care in the:  [address] [place] [address] You were discharged on:  April 6, 2018 Why you were hospitalized Your primary diagnosis was: Morbid Obesity With Bmi Of 45.0-49.9, Adult (Hcc) Follow-up Information Follow up With Details Comments Contact Info Garrison Hannah MD On 4/19/2018 Follow up appointment scheduled for April 19, 2018 at 11:30 a.m. [address] [facility] [address] [facility] 
[phone] [address] 
[phone] Your Scheduled Appointments Thursday April 19, 2018 11:30 AM EDT  
POST OP with Melo Alvarez NP [facility] Olga Lidia Gauthier ([address])  
 [address] [facility]  
[phone] Thursday May 03, 2018 11:00 AM EDT Office Visit with NORMAN Calhoun [facility] Olga Lidia Gauthier ([address])  
 [address] [place] [address]  
[phone] Thursday May 03, 2018 11:30 AM EDT Office Visit with ALYSHA NUTRI VISIT PEN [facility] Tracysa Gauthier ([address] [place] [address] Road)  
 [address] [place] [address]  
[phone] Discharge Orders None A check ld indicates which time of day the medication should be taken. My Medications CONTINUE taking these medications Instructions Each Dose to Equal  
 Morning Noon Evening Bedtime ABILIFY 5 mg tablet Generic drug:  ARIPiprazole Your last dose was: Your next dose is: Take 5 mg by mouth daily. Indications: anxiety and depression 5 mg buPROPion  mg SR tablet Commonly known as:  Karol Osborne Your last dose was: Your next dose is: Take 300 mg by mouth daily. 300 XR  Indications: ANXIETY WITH DEPRESSION  
 300 mg FORTESTA TD Your last dose was: Your next dose is:    
   
   
 4 Pump(s) by TransDERmal route daily. Indications: Low T  
 4 Pump(s)  
    
   
   
   
  
 multivitamin tablet Commonly known as:  ONE A DAY Your last dose was: Your next dose is: Take 1 Tab by mouth daily. 1 Tab Omeprazole delayed release 20 mg tablet Commonly known as:  PRILOSEC D/R Your last dose was: Your next dose is: Take 1 Tab by mouth daily. OTC  
 20 mg  
    
   
   
   
  
 oxyCODONE-acetaminophen 5-325 mg per tablet Commonly known as:  PERCOCET Your last dose was: Your next dose is: Take 1 Tab by mouth every four (4) hours as needed for Pain. Max Daily Amount: 6 Tabs. 1 Tab  
    
   
   
   
  
 temazepam 30 mg capsule Commonly known as:  RESTORIL Your last dose was: Your next dose is: Take 30 mg by mouth nightly as needed for Sleep. 30 mg  
    
   
   
   
  
 TIKOSYN PO Your last dose was: Your next dose is: Take 0.5 mg by mouth two (2) times a day. A-Fib  
 0.5 mg  
    
   
   
   
  
 ZyrTEC 10 mg tablet Generic drug:  cetirizine Your last dose was: Your next dose is: Take 10 mg by mouth daily. Indications: Allergic Rhinitis 10 mg  
    
   
   
   
  
  
STOP taking these medications   
 cholecalciferol 1,000 unit Cap Commonly known as:  VITAMIN D3  
   
  
 KLOR-CON M20 20 mEq tablet Generic drug:  potassium chloride KRILL OIL (OMEGA 3 AND 6) PO  
   
  
 METFORMIN PO Opioid Education Prescription Opioids: What You Need to Know: Prescription opioids can be used to help relieve moderate-to-severe pain and are often prescribed following a surgery or injury, or for certain health conditions. These medications can be an important part of treatment but also come with serious risks. Opioids are strong pain medicines. Examples include hydrocodone, oxycodone, fentanyl, and morphine. Heroin is an example of an illegal opioid. It is important to work with your health care provider to make sure you are getting the safest, most effective care. WHAT ARE THE RISKS AND SIDE EFFECTS OF OPIOID USE? Prescription opioids carry serious risks of addiction and overdose, especially with prolonged use. An opioid overdose, often marked by slow breathing, can cause sudden death. The use of prescription opioids can have a number of side effects as well, even when taken as directed. · Tolerance-meaning you might need to take more of a medication for the same pain relief · Physical dependence-meaning you have symptoms of withdrawal when the medication is stopped. Withdrawal symptoms can include nausea, sweating, chills, diarrhea, stomach cramps, and muscle aches. Withdrawal can last up to several weeks, depending on which drug you took and how long you took it. · Increased sensitivity to pain · Constipation · Nausea, vomiting, and dry mouth · Sleepiness and dizziness · Confusion · Depression · Low levels of testosterone that can result in lower sex drive, energy, and strength · Itching and sweating RISKS ARE GREATER WITH:      
· History of drug misuse, substance use disorder, or overdose · Mental health conditions (such as depression or anxiety) · Sleep apnea · Older age (72 years or older) · Pregnancy Avoid alcohol while taking prescription opioids. Also, unless specifically advised by your health care provider, medications to avoid include: · Benzodiazepines (such as Xanax or Valium) · Muscle relaxants (such as Soma or Flexeril) · Hypnotics (such as Ambien or Lunesta) · Other prescription opioids KNOW YOUR OPTIONS Talk to your health care provider about ways to manage your pain that don't involve prescription opioids. Some of these options may actually work better and have fewer risks and side effects. Options may include: 
· Pain relievers such as acetaminophen, ibuprofen, and naproxen · Some medications that are also used for depression or seizures · Physical therapy and exercise · Counseling to help patients learn how to cope better with triggers of pain and stress. · Application of heat or cold compress · Massage therapy · Relaxation techniques Be Informed Make sure you know the name of your medication, how much and how often to take it, and its potential risks & side effects. IF YOU ARE PRESCRIBED OPIOIDS FOR PAIN: 
· Never take opioids in greater amounts or more often than prescribed. Remember the goal is not to be pain-free but to manage your pain at a tolerable level. · Follow up with your primary care provider to: · Work together to create a plan on how to manage your pain. · Talk about ways to help manage your pain that don't involve prescription opioids. · Talk about any and all concerns and side effects. · Help prevent misuse and abuse. · Never sell or share prescription opioids · Help prevent misuse and abuse. · Store prescription opioids in a secure place and out of reach of others (this may include visitors, children, friends, and family). · Safely dispose of unused/unwanted prescription opioids: Find your community drug take-back program or your pharmacy mail-back program, or flush them down the toilet, following guidance from the Food and Drug Administration (www.fda.gov/Drugs/ResourcesForYou). · Visit www.cdc.gov/drugoverdose to learn about the risks of opioid abuse and overdose.  
· If you believe you may be struggling with addiction, tell your health care provider and ask for guidance or call [address] [place] [address] at [phone]. Discharge Instructions [facility] Jeffry Cordova M.D., F.A.C.S. 
[address] [place] [address]., [address] Ros[address], [address] [facility] Office: [phone]    Fax:  [phone] Discharge Instruction for Sleeve Gastrectomy Patients Diet: 
? Continue with the liquid diet until you are seen in the office. Make sure you sip fluids all day. Goal for total fluid intake is a minimum of 64 ounces per day. ? Aim for  Grams of protein every day. Occasionally protein shakes with whey protein will cause diarrhea after surgery due to newly developed lactose intolerance. If you experience this, there are other protein drinks on the market that do not use milk proteins such as whey. Activity: 
? Get up and walk at least once an hour during normal waking hours. ? Walk a minimum of 30 minutes every day for exercise. Rest when you are tired. ? Use your Incentive Spirometry (plastic breathing machine) 10 times every hour for two weeks. Take a slow steady breath in until you can not inhale anymore. ? You may shower. No baths, hot tubs or swimming. ? You may climb stairs. Take your time. ? No lifting more than 10-15 lbs. ? If you feel discomfort during an activity, rest. 
? Do not drive for 1 week or until you are off of all narcotics. Wound Care: ? Clean incisions with soap and water when in the shower. Pat dry. ? Leave steri-strips on until they fall off. ? A small amount of drainage may be present from the incisions. Contact the office if you notice an increase in drainage, an odor, increased redness, or fever > 100.5. Medications: 
?  You should have received a prescription for pain medication and Prilosec prior to surgery, if not, notify Dr. Jacqueline Renae team. 
 ? For upset stomach you may take over the counter medications such as Maalox, Mylanta, or Pepto Bismol. ? Gas X works very well for bloating when eating or drinking. ? You may take Tylenol. Do not exceed 4,000mg of Tylenol in one day. Percocet has Tylenol in it, you will need to consider this when taking Tylenol and Percocet together. ? Non-aspirin based arthritis medications may be resumed. ? Take a childrens or adult chewable multivitamin. ? Milk of Magnesia will provide immediate relief of constipation. Daily use of Miralax or Benefiber may be needed if you develop chronic constipation. ? It is fine to take your usual home medications. Blood pressure medications should be continued after surgery, unless it is a diuretic. Diabetic medications can frequently be reduced very quickly after surgery. Diabetics should continue to monitor blood sugars frequently after surgery and contact the prescribing physician for any questions. Follow-Up Appointment: 
? If you do not already have a follow-up appointment scheduled, contact the office in the next few days to obtain one. It is usually scheduled for 10-14 days after your surgery date. Office phone number:  [phone].  
? Call our office if you have questions, concerns or any worsening of condition. If you are not able to reach us, go to your primary care provider or the Emergency Department. [facility]ada Announcement We are excited to announce that we are making your provider's discharge notes available to you in Mirada. You will see these notes when they are completed and signed by the physician that discharged you from your recent hospital stay. If you have any questions or concerns about any information you see in Mirada, please call the Health Information Department where you were seen or reach out to your Primary Care Provider for more information about your plan of care. Introducing [facility]! [facility] introduces Wander (f. YongoPal)t patient portal. Now you can access parts of your medical record, email your doctor's office, and request medication refills online. 1. In your internet browser, go to [email or web address] 2. Click on the First Time User? Click Here link in the Sign In box. You will see the New Member Sign Up page. 3. Enter your Perfect Memory Access Code exactly as it appears below. You will not need to use this code after youve completed the sign-up process. If you do not sign up before the expiration date, you must request a new code. · Perfect Memory Access Code: [email or web address] Expires: 7/3/2018 11:06 AM 
 
4. Enter the last four digits of your Social Security Number (xxxx) and Date of Birth (mm/dd/yyyy) as indicated and click Submit. You will be taken to the next sign-up page. 5. Create a Perfect Memory ID. This will be your Perfect Memory login ID and cannot be changed, so think of one that is secure and easy to remember. 6. Create a Perfect Memory password. You can change your password at any time. 7. Enter your Password Reset Question and Answer. This can be used at a later time if you forget your password. 8. Enter your e-mail address. You will receive e-mail notification when new information is available in [address] 9. Click Sign Up. You can now view and download portions of your medical record. 10. Click the Download Summary menu link to download a portable copy of your medical information. If you have questions, please visit the Frequently Asked Questions section of the Perfect Memory website. Remember, Perfect Memory is NOT to be used for urgent needs. For medical emergencies, dial 911. Now available from your iPhone and Android! Introducing Patrick Tillman As a [facility] patient, I wanted to make you aware of our electronic visit tool called Patrick Tillman. [facility] 24/7 allows you to connect within minutes with a medical provider 24 hours a day, seven days a week via a mobile device or tablet or logging into a secure website from your computer. You can access Geekatoo from anywhere in the [place]. A virtual visit might be right for you when you have a simple condition and feel like you just dont want to get out of bed, or cant get away from work for an appointment, when your regular [address] [place] [address] provider is not available (evenings, weekends or holidays), or when youre out of town and need minor care. Electronic visits cost only $49 and if the [address] [place] [address] 24/7 provider determines a prescription is needed to treat your condition, one can be electronically transmitted to a nearby pharmacy*. Please take a moment to enroll today if you have not already done so. The enrollment process is free and takes just a few minutes. To enroll, please download the [employer] [facility] 24/7 zeyad to your tablet or phone, or visit [email or web address] to enroll on your computer. And, as an [address] [place] [address] patient with a [employer] account, the results of your visits will be scanned into your electronic medical record and your primary care provider will be able to view the scanned results. We urge you to continue to see your regular [address] [place] [address] provider for your ongoing medical care. And while your primary care provider may not be the one available when you seek a Patrick Victoralmafin virtual visit, the peace of mind you get from getting a real diagnosis real time can be priceless. For more information on Helidynealmafin, view our Frequently Asked Questions (FAQs) at [email or web address]. Sincerely, 
 
Joyce Carbajal MD 
Chief Medical Officer Mario Boyle *:  certain medications cannot be prescribed via Patrick Sanguineaubrey Providers Seen During Your Hospitalization Provider Specialty Primary office phone Annie Ferraro MD General Surgery [phone] Your Primary Care Physician (PCP) Primary Care Physician Office Phone Office Fax Sebastian Wilkes [phone] You are allergic to the following No active allergies Recent Documentation Height Weight BMI Smoking Status 1.956 m (!) 158.8 kg 41.53 kg/m2 Never Smoker Emergency Contacts Name Discharge Info Relation Home Work Mobile Joleen Carrasco DISCHARGE CAREGIVER [3] Daughter [21]   [phone] Neftaly Carrasco DISCHARGE CAREGIVER [3] Spouse [3] [phone] Patient Belongings The following personal items are in your possession at time of discharge: 
  Dental Appliances: Uppers, At home  Visual Aid: None      Home Medications: None   Jewelry: None  Clothing: Undergarments, Footwear, Socks, Shirt, Pants (with Meche Tomahawk)    Other Valuables: Wallet, Cell Phone (with Meche Tomahawk) Please provide this summary of care documentation to your next provider. Signatures-by signing, you are acknowledging that this After Visit Summary has been reviewed with you and you have received a copy. Patient Signature:  ____________________________________________________________ Date:  ____________________________________________________________  
  
Romaineriana cristina [facility] Provider Signature:  ____________________________________________________________ Date:  ____________________________________________________________

## 2018-04-05 NOTE — ANESTHESIA POSTPROCEDURE EVALUATION
Post-Anesthesia Evaluation & Assessment    Visit Vitals    /69    Pulse 63    Temp 36.7 °C (98 °F)    Resp 17    Ht 6' 5\" (1.956 m)    Wt (!) 158.8 kg (350 lb 3 oz)    SpO2 96%    BMI 41.53 kg/m2       No untreated/active PONV    Post-operative hydration adequate. Adequate post-operative analgesia per PACU discharge criteria    Mental status & level of consciousness: alert and oriented x 3    Respiratory status: patent unassisted airway     No apparent anesthetic complications requiring additional post-anesthetic care    Patient has met all discharge requirements.             Elzbieta Dahl CRNA

## 2018-04-05 NOTE — PROGRESS NOTES
1400-received report from [facility] included SBAR MAR and Kardex. 1415-arrived on unit  TRANSFER - IN REPORT:    Verbal report received from AKSHAT Grimes RN(name) on Augustina Levering  being received from [employer]) for routine post - op      Report consisted of patients Situation, Background, Assessment and   Recommendations(SBAR). Information from the following report(s) SBAR, Kardex and MAR was reviewed with the receiving nurse. Opportunity for questions and clarification was provided. Assessment completed upon patients arrival to unit and care assumed. Bedside and Verbal shift change report given to Reynold Samuel RN (oncoming nurse) by Tommy Valadez RN (offgoing nurse). Report included the following information SBAR, Kardex and MAR.

## 2018-04-05 NOTE — ANESTHESIA PREPROCEDURE EVALUATION
Anesthetic History     PONV          Review of Systems / Medical History  Patient summary reviewed, nursing notes reviewed and pertinent labs reviewed    Pulmonary        Sleep apnea           Neuro/Psych         Psychiatric history     Cardiovascular    Hypertension: well controlled        Dysrhythmias       Exercise tolerance: >4 METS     GI/Hepatic/Renal           Liver disease     Endo/Other        Morbid obesity     Other Findings              Physical Exam    Airway  Mallampati: III  TM Distance: 4 - 6 cm  Neck ROM: normal range of motion, short neck        Cardiovascular    Rhythm: regular  Rate: normal         Dental  No notable dental hx       Pulmonary  Breath sounds clear to auscultation               Abdominal  GI exam deferred       Other Findings            Anesthetic Plan    ASA: 3  Anesthesia type: general          Induction: Intravenous  Anesthetic plan and risks discussed with: Patient

## 2018-04-05 NOTE — PERIOP NOTES
TRANSFER - IN REPORT:    Verbal report received from ORN and CRNA(name) on Sammy Moe  being received from OR(unit) for routine progression of care      Report consisted of patients Situation, Background, Assessment and   Recommendations(SBAR). Information from the following report(s) SBAR, Kardex, OR Summary, Procedure Summary, MAR and Recent Results was reviewed with the receiving nurse. Opportunity for questions and clarification was provided. Assessment completed upon patients arrival to unit and care assumed.

## 2018-04-05 NOTE — H&P (VIEW-ONLY)
Sleeve Gastrectomy - History and Physical    Subjective: The patient is a 66 y.o. obese male with a Body mass index is 41.86 kg/(m^2). .   he presents now to review their work up to date to see if they are a candidate for surgery and whether or not to proceed with the previously requested procedure. He is a prior gastric banding pt who had his band removed late 9/2017. Bariatric comorbidities continue to include:   Patient Active Problem List   Diagnosis Code    High triglycerides E78.1    Sleep apnea G47.30    Chronic back pain M54.9, G89.29    Hypertension I10    Atrial fibrillation (HCC) I48.91    Morbid obesity (HCC) E66.01    History of atrial fibrillation Z86.79    Depression F32.9    SHAW (nonalcoholic steatohepatitis) K75.81    H/O laparoscopic adjustable gastric banding Z98.84    H/O lumbosacral spine surgery Z98.890    S/P appendectomy Z90.49    History of removal of laparoscopic gastric banding device Z98.84    BMI 40.0-44.9, adult (HCC) Z68.41       They have been generally well prior to this visit and have had no recent significant illnesses. The patient has had no gastrointestinal issues that would preclude them from proceeding with the surgery they have chosen. Zach Benson has recently tried a preoperative weight loss program  in addition to seeing a bariatric nutritionist preoperatively. We have discussed on at least one other occasion about the various types of surgical weight loss procedures and they have considered these options after our initial consultation. We have once again discussed these procedures in detail and they have now decided on a surgical procedure. They present today to discuss this and confirm that their evaluation pre operatively is acceptable to continue with surgery. The patient desires laparoscopic sleeve gastrectomy for surgical weight loss. The patients goal weight is 220lb.  (this represents a BMI of 27)    These goals are consistent with expected outcomes of their desired operation. his Medical goals are resolution of these health issues. Patient Active Problem List    Diagnosis Date Noted    History of removal of laparoscopic gastric banding device 10/12/2017    BMI 40.0-44.9, adult ([facility] [address] 10/12/2017    H/O laparoscopic adjustable gastric banding 08/10/2016    H/O lumbosacral spine surgery 08/10/2016    S/P appendectomy 08/10/2016    SHAW (nonalcoholic steatohepatitis) 11/07/2013    History of atrial fibrillation     Depression     Atrial fibrillation ([facility] [address] 11/19/2012    Morbid obesity ([facility] [address] 11/19/2012    High triglycerides 08/01/2012    Sleep apnea 08/01/2012    Chronic back pain 08/01/2012    Hypertension      Past Surgical History:   Procedure Laterality Date    CARDIAC CATHETERIZATION  1/2013    HX APPENDECTOMY  1990    HX HEENT      UPPP    HX LUMBAR DISKECTOMY      HX ORTHOPAEDIC      L1-S5 diskectomy    HX SHOULDER ARTHROSCOPY Left 2017    LAP, PLACE ADJUST GASTR BAND      removed 2017      Social History   Substance Use Topics    Smoking status: Never Smoker    Smokeless tobacco: Never Used    Alcohol use 3.0 oz/week     6 Cans of beer per week      Family History   Problem Relation Age of Onset    Hypertension Mother     Diabetes Mother     Heart Disease Father       Current Outpatient Prescriptions   Medication Sig Dispense Refill    oxyCODONE-acetaminophen (PERCOCET) 5-325 mg per tablet Take 1 Tab by mouth every four (4) hours as needed for Pain. Max Daily Amount: 6 Tabs. 30 Tab 0    Omeprazole delayed release (PRILOSEC D/R) 20 mg tablet Take 1 Tab by mouth daily. OTC 30 Tab 1    METFORMIN HCL (METFORMIN PO) Take 500 mg by mouth two (2) times a day. Indications: Pre DM      ARIPiprazole (ABILIFY) 5 mg tablet Take 5 mg by mouth daily. Indications: anxiety and depression      potassium chloride (KLOR-CON M20) 20 mEq tablet Take 20 mEq by mouth daily.       temazepam (RESTORIL) 30 mg capsule Take 30 mg by mouth nightly as needed for Sleep.  KRILL/OM3/DHA/EPA/OM6/LIP/ASTX (KRILL OIL, OMEGA 3 AND 6, PO) Take  by mouth daily.  cholecalciferol (VITAMIN D3) 1,000 unit cap Take 1,000 Units by mouth daily.  TESTOSTERONE (FORTESTA TD) 4 Pump(s) by TransDERmal route daily. Indications: Low T      DOFETILIDE (TIKOSYN PO) Take 0.5 mg by mouth two (2) times a day. A-Fib      multivitamin (ONE A DAY) tablet Take 1 Tab by mouth daily.  cetirizine (ZYRTEC) 10 mg tablet Take 10 mg by mouth daily. Indications: Allergic Rhinitis      buPROPion SR (WELLBUTRIN SR) 150 mg SR tablet Take 300 mg by mouth daily.  300 XR  Indications: ANXIETY WITH DEPRESSION       No Known Allergies     Review of Systems:     General - No history or complaints of unexpected fever, chills, or weight loss  Head/Neck - No history or complaints of headache, diplopia, dysphagia, hearing loss  Cardiac - No history or complaints of chest pain, palpitations, murmur, or shortness of breath  Pulmonary - No history or complaints of shortness of breath, productive cough, hemoptysis  Gastrointestinal - No history or complaints of reflux,  abdominal pain, obstipation/constipation, blood per rectum  Genitourinary - No history or complaints of hematuria/dysuria, stress urinary incontinence symptoms, or renal lithiasis  Musculoskeletal - No history or complaints of joint pain or muscular weakness  Hematologic - No history or complaints of bleeding disorders, blood transfusions, sickle cell anemia  Neurologic - No history or complaints of  migraine headaches, seizure activity, syncopal episodes, TIA or stroke  Integumentary - No history or complaints of rashes, abnormal nevi, skin cancer    Objective:     Visit Vitals    /68 (BP 1 Location: Left arm, BP Patient Position: Sitting)    Pulse 70    Resp 16    Ht 6' 5\" (1.956 m)    Wt (!) 160.1 kg (353 lb)    SpO2 99%    BMI 41.86 kg/m2       Physical Examination: General appearance - alert, well appearing, and in no distress  Mental status - alert, oriented to person, place, and time  Eyes - pupils equal and reactive, extraocular eye movements intact  Ears - bilateral TM's and external ear canals normal  Nose - normal and patent, no erythema, discharge or polyps  Mouth - mucous membranes moist, pharynx normal without lesions  Neck - supple, no significant adenopathy  Lymphatics - no palpable lymphadenopathy, no hepatosplenomegaly  Chest - clear to auscultation, no wheezes, rales or rhonchi, symmetric air entry  Heart - normal rate, regular rhythm, normal S1, S2, no murmurs, rubs, clicks or gallops  Abdomen - soft, nontender, nondistended, no masses or organomegaly  Back exam - full range of motion, no tenderness, palpable spasm or pain on motion  Neurological - alert, oriented, normal speech, no focal findings or movement disorder noted  Musculoskeletal - no joint tenderness, deformity or swelling  Extremities - peripheral pulses normal, no pedal edema, no clubbing or cyanosis  Skin - normal coloration and turgor, no rashes, no suspicious skin lesions noted    Labs / Preoperative Evaluation:        Recent Results (from the past 1008 hour(s))   EKG, 12 LEAD, INITIAL    Collection Time: 03/26/18 12:04 PM   Result Value Ref Range    Ventricular Rate 56 BPM    Atrial Rate 56 BPM    P-R Interval 182 ms    QRS Duration 88 ms    Q-T Interval 420 ms    QTC Calculation (Bezet) 405 ms    Calculated P Axis 53 degrees    Calculated R Axis 58 degrees    Calculated T Axis 8 degrees    Diagnosis       Sinus bradycardia  Otherwise normal ECG  When compared with ECG of 18-SEP-2017 11:23,  No significant change was found     CBC WITH AUTOMATED DIFF    Collection Time: 03/26/18 12:16 PM   Result Value Ref Range    WBC 6.3 4.6 - 13.2 K/uL    RBC 5.33 4.70 - 5.50 M/uL    HGB 17.5 (H) 13.0 - 16.0 g/dL    HCT 49.7 (H) 36.0 - 48.0 %    MCV 93.2 74.0 - 97.0 FL    MCH 32.8 24.0 - 34.0 PG    MCHC 35.2 31.0 - 37.0 g/dL RDW 13.3 11.6 - 14.5 %    PLATELET 454 608 - 464 K/uL    MPV 10.7 9.2 - 11.8 FL    NEUTROPHILS 49 40 - 73 %    LYMPHOCYTES 36 21 - 52 %    MONOCYTES 12 (H) 3 - 10 %    EOSINOPHILS 2 0 - 5 %    BASOPHILS 1 0 - 2 %    ABS. NEUTROPHILS 3.1 1.8 - 8.0 K/UL    ABS. LYMPHOCYTES 2.3 0.9 - 3.6 K/UL    ABS. MONOCYTES 0.8 0.05 - 1.2 K/UL    ABS. EOSINOPHILS 0.2 0.0 - 0.4 K/UL    ABS. BASOPHILS 0.0 0.0 - 0.06 K/UL    DF AUTOMATED         Assessment:     Morbid obesity with comorbidity    Plan:     laparoscopic sleeve gastrectomy    This is a 66 y.o. male with a BMI of Body mass index is 41.86 kg/(m^2). and the weight-related co-morbidties as noted above. Teresita Ludwig meets the NIH criteria for bariatric surgery based upon the BMI of Body mass index is 41.86 kg/(m^2). and multiple weight-related co-morbidties. Teresita Ludwig has elected laparoscopic sleeve gastrectomy as his intervention of choice for treatment of morbid obestiy through surgical means secondary to its safety profile, rapid return to work  and decreases in operative risks over gastric bypass. In the office today, following Placido's history and physical examination, a 40 minute discussion regarding the anatomic alterations for the laparoscopic sleeve gastrectomy was undertaken. The dietary expectations and the patient  dependent factors for success were thoroughly discussed, to include the need for interval follow-up and long-term dietary changes associated with success. The possible complications of the sleeve gastrectomy  were also discussed, to include;death, DVT/PE, staple line leak, bleeding, stricture formation, infection, nutritional deficiencies and sleeve dilation. Specific weight related outcomes for success were also discussed with an emphasis on careful and close follow-up with the first year and eating behavior modification as the baseline and cyclical hunger return.   The patient expressed an understanding of the above factors, and his questions were answered in their entirety. In addition, the patient attended a 1.5 hour power point seminar regarding obesity, surgical weight loss including, adjustable gastric band, gastric bypass, and sleeve gastrectomy. This discussion contrasted the different surgical techniques, mechanisms of actions and expected outcomes, and surgical and medical risks associated with each procedure. During this seminar, there was a long question and answer session where each questions was answered until there were no additional questions. Today, the patient had all of his questions answered and the decision was made today that the patient's preoperative evaluation is acceptable for them  to proceed with bariatric surgery  choosing the sleeve gastrectomy as his surgical option. The patient understands the plan of action    He understands this is considered revision surgery and as such the above mentioned risks are elevated    Known polycythemia in pt with testosterone supplements    Secondary Diagnoses:     DVT / Pulmonary Embolus Risk - The patient is at a higher risk for post operative DVT / pulmonary embolus secondary to their morbid obese status, relative sedentary lifestyle, and impending general anesthetic. We will plan to use anticoagulation therapy pre and post operative as well as  pneumatic compression devices and encourage ambulation once on the hospital nursing floor. The need for possible at home anticoagulation therapy has also been discussed and any decision on this matter will be made during post operative evaluations. The patient understands that their efforts at ambulation are of vital importance to reduce the risk of this complication thus placing significant burden on them as to the prevention of such issues. Signs and symptoms of DVT / PE have been discussed with the patient and they have been instructed to call the office if any these occur in the \"at home\" post op phase.     Obstructive Sleep Apnea -The patient understands the association of sleep apnea and obesity and the additional risk that it caries related to post surgical complications. We will have the patient bring their CPAP machine to the hospital for use both postoperatively in the PACU and on the floor at its appropriate setting.  We will have them continue using it while at home after surgery and follow up with their pulmonologist 6 months after to be retested to see if it can be discontinued at that time period. Hypertension - The patient has a clear understanding of how weight loss improves hypertension as a whole, but also they understand that there is a significant genetic component to this disease process. We will monitor the patients blood pressure while in the hospital and the plan would be to continue those medications postoperatively.  If a diuretic is being used we will stop them on discharge to prevent dehydration particularly with the sleeve gastrectomy and the gastric bypass procedures.  They will be instructed to monitor their blood pressure postoperatively while at home and notify their primary care physician in the event of any significantly high or uncharacteristic readings. Hyperlipidemia - The patient understands that studies show that almost all patient will realize an improvement in their lipid profile with weight loss that occurs with these procedures. They however also understand that hyperlipidemia is a multifactorial disease particularly as it pertains to their genetic background and that there is no guarantee toward cure  of this issue. We will resume their medications immediately postoperatively as this tends to decrease any post operative cardiac events.  The patient will follow up with their family physician in the postoperative period with plans to repeat their lipid panel 2-3 month postoperative for potential adjustment or removal of these medications.     Signed By: Stephen Ortiz MD     March 26, 2018

## 2018-04-05 NOTE — BRIEF OP NOTE
BRIEF OPERATIVE NOTE    Date of Procedure: 4/5/2018   Preoperative Diagnosis: HYPERTENSION, SLEEP APNEA, MORBID OBESITY, BMI 41, STATUS POST BARIATRIC SURGERY,ELEVATED LIVER ENZYMES  Postoperative Diagnosis: HYPERTENSION, SLEEP APNEA, MORBID OBESITY, BMI 41, STATUS POST BARIATRIC SURGERY,ELEVATED LIVER ENZYMES    Procedure(s): 1. LAPAROSCOPIC LYSIS OF ADHESIONS GREATER THAN 30 MINUTES  2. LAPAROSCOPIC CONVERSION LAP BAND TO GASTRIC SLEEVE  3. WEDGE LIVER BIOPSY   4. INTRAOPERATIVE ENDOSCOPY WITH BIOPSY  Surgeon(s) and Role:     * Gilda Shah MD - Primary         Assistant Staff: Physician Assistant: NORMAN Clayton      Surgical Staff:  Circ-1: Madi Hinds RN  Circ-Relief: Stevphen Gaucher, RN  Physician Assistant: NORMAN Clayton  Scrub Tech-1: Allen Remedies  Surg Asst-1: Chiara Monreal  Event Time In   Incision Start 1253   Incision Close 1419     Anesthesia: General   Estimated Blood Loss: 10cc  Specimens:   ID Type Source Tests Collected by Time Destination   1 : [address], MD 4/5/2018 1302 Pathology   2 : PRE-PYLORIC BIOPSY Preservative Stomach, Antrum  Gilda Shah MD 4/5/2018 1302 Pathology   3 : [facility] LIVER BIOPSY Preservative Liver  Gilda Shah MD 4/5/2018 1302 Pathology      Findings: dense adhesive disease of upper abdomen   Complications: none  Implants:   Implant Name Type Inv.  Item Serial No.  Lot No. LRB No. Used Action   CARTRIDGE STPL VERITAS 60ECH -- KERLINE-STRIPS DRY - [id number]  CARTRIDGE STPL VERITAS 60ECH -- KERLINE-STRIPS DRY  [employer] [id number]/A 5 Implanted   CARTRIDGE STPL VERITAS 60ECH -- KERLINE-STRIPS DRY - [id number]  CARTRIDGE STPL VERITAS 60ECH -- KERLINE-STRIPS DRY  [employer] [id number]/A 1 Implanted   CARTRIDGE STPL VERITAS 60ECH -- KERLINE-STRIPS DRY - [id number]   CARTRIDGE STPL VERITAS 60ECH -- KERLINE-STRIPS DRY   [employer] [id number] N/A 1 Implanted

## 2018-04-05 NOTE — NURSE NAVIGATOR
Doing well. Denies nausea. Pain = \"tolerable\". Tolerating ice chips. Dressings dry and intact. Adequate urinary output. HOLLY output WNL. Encouraged to cough, deep breathe, and use spirometer every hour while awake. Encouraged to be out of bed ambulating this evening. Discussed diet and activity progression tomorrow after UGI.

## 2018-04-05 NOTE — INTERVAL H&P NOTE
H&P Update:  Zach Benson was seen and examined. History and physical has been reviewed. The patient has been examined.  There have been no significant clinical changes since the completion of the originally dated History and Physical.    Signed By: Sol Barnes MD     April 5, 2018 9:25 AM

## 2018-04-06 ENCOUNTER — APPOINTMENT (OUTPATIENT)
Dept: GENERAL RADIOLOGY | Age: 58
DRG: 621 | End: 2018-04-06
Attending: SPECIALIST
Payer: OTHER GOVERNMENT

## 2018-04-06 VITALS
OXYGEN SATURATION: 100 % | BODY MASS INDEX: 37.19 KG/M2 | SYSTOLIC BLOOD PRESSURE: 148 MMHG | HEIGHT: 77 IN | RESPIRATION RATE: 18 BRPM | TEMPERATURE: 97.5 F | DIASTOLIC BLOOD PRESSURE: 84 MMHG | WEIGHT: 315 LBS | HEART RATE: 60 BPM

## 2018-04-06 LAB
GLUCOSE BLD STRIP.AUTO-MCNC: 112 MG/DL (ref 70–110)
GLUCOSE BLD STRIP.AUTO-MCNC: 116 MG/DL (ref 70–110)
GLUCOSE BLD STRIP.AUTO-MCNC: 91 MG/DL (ref 70–110)

## 2018-04-06 PROCEDURE — 82962 GLUCOSE BLOOD TEST: CPT

## 2018-04-06 PROCEDURE — 74011636320 HC RX REV CODE- 636/320: Performed by: SPECIALIST

## 2018-04-06 PROCEDURE — 74240 X-RAY XM UPR GI TRC 1CNTRST: CPT

## 2018-04-06 PROCEDURE — 74011250636 HC RX REV CODE- 250/636: Performed by: SPECIALIST

## 2018-04-06 RX ORDER — OXYCODONE AND ACETAMINOPHEN 5; 325 MG/1; MG/1
1 TABLET ORAL
Status: DISCONTINUED | OUTPATIENT
Start: 2018-04-06 | End: 2018-04-06 | Stop reason: HOSPADM

## 2018-04-06 RX ADMIN — IOHEXOL 50 ML: 240 INJECTION, SOLUTION INTRATHECAL; INTRAVASCULAR; INTRAVENOUS; ORAL at 08:24

## 2018-04-06 RX ADMIN — ACETAMINOPHEN 1000 MG: 10 INJECTION, SOLUTION INTRAVENOUS at 01:33

## 2018-04-06 RX ADMIN — KETOROLAC TROMETHAMINE 30 MG: 30 INJECTION, SOLUTION INTRAMUSCULAR at 06:30

## 2018-04-06 RX ADMIN — SODIUM CHLORIDE, SODIUM LACTATE, POTASSIUM CHLORIDE, AND CALCIUM CHLORIDE 150 ML/HR: 600; 310; 30; 20 INJECTION, SOLUTION INTRAVENOUS at 01:33

## 2018-04-06 RX ADMIN — KETOROLAC TROMETHAMINE 30 MG: 30 INJECTION, SOLUTION INTRAMUSCULAR at 11:11

## 2018-04-06 RX ADMIN — ACETAMINOPHEN 1000 MG: 10 INJECTION, SOLUTION INTRAVENOUS at 06:30

## 2018-04-06 RX ADMIN — ENOXAPARIN SODIUM 40 MG: 40 INJECTION SUBCUTANEOUS at 11:11

## 2018-04-06 RX ADMIN — KETOROLAC TROMETHAMINE 30 MG: 30 INJECTION, SOLUTION INTRAMUSCULAR at 01:33

## 2018-04-06 RX ADMIN — ONDANSETRON 4 MG: 2 INJECTION INTRAMUSCULAR; INTRAVENOUS at 06:30

## 2018-04-06 RX ADMIN — Medication 3 G: at 06:30

## 2018-04-06 RX ADMIN — ENOXAPARIN SODIUM 40 MG: 40 INJECTION SUBCUTANEOUS at 01:33

## 2018-04-06 RX ADMIN — SODIUM CHLORIDE, SODIUM LACTATE, POTASSIUM CHLORIDE, AND CALCIUM CHLORIDE 150 ML/HR: 600; 310; 30; 20 INJECTION, SOLUTION INTRAVENOUS at 11:11

## 2018-04-06 RX ADMIN — IOHEXOL 1 ML: 240 INJECTION, SOLUTION INTRATHECAL; INTRAVASCULAR; INTRAVENOUS; ORAL at 08:28

## 2018-04-06 NOTE — DISCHARGE INSTRUCTIONS
[facility]  Korin Cardenas. Bhanu Lai M.D., .A.C.[address], [address] [facility]  Office: [phone]    Fax:  [phone]    Discharge Instruction for Sleeve Gastrectomy Patients    Diet:   Continue with the liquid diet until you are seen in the office. Make sure you sip fluids all day. Goal for total fluid intake is a minimum of 64 ounces per day.  Aim for  Grams of protein every day. Occasionally protein shakes with whey protein will cause diarrhea after surgery due to newly developed lactose intolerance. If you experience this, there are other protein drinks on the market that do not use milk proteins such as whey. Activity:   Get up and walk at least once an hour during normal waking hours.  Walk a minimum of 30 minutes every day for exercise. Rest when you are tired.  Use your Incentive Spirometry (plastic breathing machine) 10 times every hour for two weeks. Take a slow steady breath in until you can not inhale anymore.  You may shower. No baths, hot tubs or swimming.  You may climb stairs. Take your time.  No lifting more than 10-15 lbs.  If you feel discomfort during an activity, rest.   Do not drive for 1 week or until you are off of all narcotics. Wound Care:   Clean incisions with soap and water when in the shower. Pat dry.  Leave steri-strips on until they fall off.  A small amount of drainage may be present from the incisions. Contact the office if you notice an increase in drainage, an odor, increased redness, or fever > 100.5. Medications:   You should have received a prescription for pain medication and Prilosec prior to surgery, if not, notify Dr. Hiram Stone team.  Wayne Mano For upset stomach you may take over the counter medications such as Maalox, Mylanta, or Pepto Bismol.  Gas X works very well for bloating when eating or drinking.  You may take Tylenol.   Do not exceed 4,000mg of Tylenol in one day.  Percocet has Tylenol in it, you will need to consider this when taking Tylenol and Percocet together.  Non-aspirin based arthritis medications may be resumed.  Take a childrens or adult chewable multivitamin.  Milk of Magnesia will provide immediate relief of constipation. Daily use of Miralax or Benefiber may be needed if you develop chronic constipation.  It is fine to take your usual home medications. Blood pressure medications should be continued after surgery, unless it is a diuretic. Diabetic medications can frequently be reduced very quickly after surgery. Diabetics should continue to monitor blood sugars frequently after surgery and contact the prescribing physician for any questions. Follow-Up Appointment:   If you do not already have a follow-up appointment scheduled, contact the office in the next few days to obtain one. It is usually scheduled for 10-14 days after your surgery date. Office phone number:  [phone].  Call our office if you have questions, concerns or any worsening of condition. If you are not able to reach us, go to your primary care provider or the Emergency Department.

## 2018-04-06 NOTE — ROUTINE PROCESS
Bedside and Verbal shift change report given to Maged Harkins RN (oncoming nurse) by Narcisa Gonzáles RN (offgoing nurse). Report included the following information SBAR, Kardex, OR Summary, Procedure Summary, Intake/Output, MAR, Recent Results and Med Rec Status.

## 2018-04-06 NOTE — DISCHARGE SUMMARY
Discharge Summary    Patient: Yulia Pearson               Sex: male          DOA: 4/5/2018         YOB: 1960      Age:  66 y.o.        LOS:  LOS: 1 day                Admit Date: 4/5/2018    Discharge Date: 4/6/2018    Admission Diagnoses: HYPERTENSION, SLEEP APNEA, MORBID OBESITY, BMI 41, STATUS POST BARIATRIC SURGERY  Morbid obesity with BMI of 45.0-49.9, adult [facility])    Discharge Diagnoses:    Problem List as of 4/6/2018  Date Reviewed: 3/26/2018          Codes Class Noted - Resolved    * (Principal)Morbid obesity with BMI of 45.0-49.9, adult ([facility] ICD-10-CM: E66.01, Z68.42  ICD-9-CM: 278.01, V85.42  4/5/2018 - Present        History of removal of laparoscopic gastric banding device ICD-10-CM: Z98.84  ICD-9-CM: V45.86  10/12/2017 - Present        BMI 40.0-44.9, adult ([facility] ICD-10-CM: Z68.41  ICD-9-CM: V85.41  10/12/2017 - Present        H/O laparoscopic adjustable gastric banding ICD-10-CM: Z98.84  ICD-9-CM: V45.86  8/10/2016 - Present        H/O lumbosacral spine surgery ICD-10-CM: Z98.890  ICD-9-CM: V15.29  8/10/2016 - Present        S/P appendectomy ICD-10-CM: Z90.49  ICD-9-CM: V45.89  8/10/2016 - Present        SHAW (nonalcoholic steatohepatitis) ICD-10-CM: K75.81  ICD-9-CM: 571.8  11/7/2013 - Present        History of atrial fibrillation ICD-10-CM: Z86.79  ICD-9-CM: V12.59  Unknown - Present    Overview Signed 8/5/2013  9:52 AM by NORMAN Dennison     cardioverted / no recurrance             Depression ICD-10-CM: F32.9  ICD-9-CM: 311  Unknown - Present        Atrial fibrillation ([facility] ICD-10-CM: I48.91  ICD-9-CM: 427.31  11/19/2012 - Present    Overview Signed 8/10/2016 12:06 PM by Loni Dobbins MD     In SR with medication             Morbid obesity ([facility] ICD-10-CM: E66.01  ICD-9-CM: 278.01  11/19/2012 - Present        High triglycerides ICD-10-CM: E78.1  ICD-9-CM: 272.1  8/1/2012 - Present        Sleep apnea ICD-10-CM: G47.30  ICD-9-CM: 780.57  8/1/2012 - Present    Overview Signed 8/1/2012 11:21 AM by Edel Nunes     c-pap machine               Chronic back pain ICD-10-CM: M54.9, G89.29  ICD-9-CM: 724.5, 338.29  8/1/2012 - Present        Hypertension ICD-10-CM: I10  ICD-9-CM: 401.9  Unknown - Present        RESOLVED: HTN (hypertension) ICD-10-CM: I10  ICD-9-CM: 401.9  11/19/2012 - 8/10/2016        RESOLVED: Essential hypertension, benign ICD-10-CM: I10  ICD-9-CM: 401.1  8/1/2012 - 8/10/2016              Discharge Condition: Good    Hospital Course: The patient underwent  conversion from lap band to laparoscopic sleeve gastrectomy  on 4/5/2018. The patient tolerated the procedure well. Vital signs remained stable and the patient was transferred to  3rd floor surgical unit without complications. The patient remained stable throughout the first night post operatively with stable vital signs and adequate urine output and pain control. Pain was controlled with Dilaudid IV and IV Tylenol . The patient on the first morning post operative was transferred to the radiology suite where they underwent a gastrograffin UGI study which showed no evidence of a leak or stricture. The drain was discontinued on POD # 1 and the patient was started on a bariatric liquid diet with protein shakes. The patient progressed throughout the day and was ambulating well and tolerating their diet. They were therefore discharged home with instructions to notify me with any issues that may arise. Significant Diagnostic Studies:   No results for input(s): HGB, HGBEXT in the last 72 hours. No results for input(s): HCT, HCTEXT in the last 72 hours. Current Discharge Medication List      CONTINUE these medications which have NOT CHANGED    Details   ARIPiprazole (ABILIFY) 5 mg tablet Take 5 mg by mouth daily. Indications: anxiety and depression      temazepam (RESTORIL) 30 mg capsule Take 30 mg by mouth nightly as needed for Sleep. TESTOSTERONE (FORTESTA TD) 4 Pump(s) by TransDERmal route daily. Indications: Low T      DOFETILIDE (TIKOSYN PO) Take 0.5 mg by mouth two (2) times a day. A-Fib      multivitamin (ONE A DAY) tablet Take 1 Tab by mouth daily. cetirizine (ZYRTEC) 10 mg tablet Take 10 mg by mouth daily. Indications: Allergic Rhinitis      buPROPion SR (WELLBUTRIN SR) 150 mg SR tablet Take 300 mg by mouth daily. 300 XR  Indications: ANXIETY WITH DEPRESSION      oxyCODONE-acetaminophen (PERCOCET) 5-325 mg per tablet Take 1 Tab by mouth every four (4) hours as needed for Pain. Max Daily Amount: 6 Tabs. Qty: 30 Tab, Refills: 0    Associated Diagnoses: Post-op pain      Omeprazole delayed release (PRILOSEC D/R) 20 mg tablet Take 1 Tab by mouth daily. OTC  Qty: 30 Tab, Refills: 1         STOP taking these medications       METFORMIN HCL (METFORMIN PO) Comments:   Reason for Stopping:         potassium chloride (KLOR-CON M20) 20 mEq tablet Comments:   Reason for Stopping:         KRILL/OM3/DHA/EPA/OM6/LIP/ASTX (KRILL OIL, OMEGA 3 AND 6, PO) Comments:   Reason for Stopping:         cholecalciferol (VITAMIN D3) 1,000 unit cap Comments:   Reason for Stopping:               Activity: activity as tolerated with no heavy lifting of greater than 20 pounds. No anti- inflammatory medications. Use stool softeners at home as needed while taking pain medications since they are constipating. Diet: Bariatric liquid diet    Wound Care: Keep wound clean and dry, Reinforce dressing PRN and ice to area for comfort. Do not get wound wet for 2 days.     Follow-up: 14 days with Dr Marjan Hazel M.D

## 2018-04-06 NOTE — PROGRESS NOTES
Bariatric Surgery                POD #1    Visit Vitals    /84 (BP 1 Location: Right arm, BP Patient Position: At rest)    Pulse 60    Temp 97.5 °F (36.4 °C)    Resp 18    Ht 6' 5\" (1.956 m)    Wt (!) 158.8 kg (350 lb 3 oz)    SpO2 100%    BMI 41.53 kg/m2     Patient has minimal complaints of pain, minimal nausea noted     Exam:  Appears well in no distress  Lungs- clear bilaterally  Abd - soft, incisions look good without erythema           HOLLY with minimal serosanguinous output  Extremities- no new edema or swelling    UGI - no obstrustion or leak    Data Review:    Labs: Results:       Chemistry No results for input(s): GLU, NA, K, CL, CO2, BUN, CREA, CA, AGAP, BUCR, TBIL, GPT, AP, TP, ALB, GLOB, AGRAT in the last 72 hours. CBC w/Diff No results for input(s): WBC, RBC, HGB, HCT, PLT, GRANS, LYMPH, EOS, RETIC, HGBEXT, HCTEXT, PLTEXT in the last 72 hours. Coagulation No results for input(s): PTP, INR, APTT in the last 72 hours. No lab exists for component: INREXT    Liver Enzymes No results for input(s): TP, ALB, TBIL, AP, SGOT, GPT in the last 72 hours. No lab exists for component: DBIL       Assessment/Plan: S/P  laparoscopic sleeve gastrectomy - doing well without any issues    1.  Agree with DC now

## 2018-04-06 NOTE — PROGRESS NOTES
Patient is currently wearing his home CPAP machine, breathing comfortably, no distress. , will continue to monitor.

## 2018-04-06 NOTE — PROGRESS NOTES
Discharge education given to patient, all questions answered. Information reviewed by DELFINO Nielsen. Patient IV catheter removed, no signs of infiltration, pain, edema, bleeding. Patient was escorted via wheel chair to main entrance.

## 2018-04-06 NOTE — ROUTINE PROCESS
Bedside and Verbal shift change report given to Destin Rojas RN (oncoming nurse) by Mayur Penaloza RN (offgoing nurse). Report included the following information SBAR, Kardex, OR Summary, Procedure Summary, Intake/Output, MAR, Recent Results and Med Rec Status.

## 2018-04-06 NOTE — PROGRESS NOTES
Bariatric Surgery                POD #1    Visit Vitals    /72 (BP 1 Location: Right arm, BP Patient Position: At rest)    Pulse 60    Temp 98.1 °F (36.7 °C)    Resp 16    Ht 6' 5\" (1.956 m)    Wt (!) 158.8 kg (350 lb 3 oz)    SpO2 97%    BMI 41.53 kg/m2     Patient has minimal complaints of pain, minimal nausea noted     Exam:  Appears well in no distress  Lungs- clear bilaterally  Abd - distended, incisions look good without erythema           HOLLY with minimal serosanguinous output  Extremities- no new edema or swelling    UGI - pending    Data Review:    Labs: Results:       Chemistry No results for input(s): GLU, NA, K, CL, CO2, BUN, CREA, CA, AGAP, BUCR, TBIL, GPT, AP, TP, ALB, GLOB, AGRAT in the last 72 hours. CBC w/Diff No results for input(s): WBC, RBC, HGB, HCT, PLT, GRANS, LYMPH, EOS, RETIC, HGBEXT, HCTEXT, PLTEXT in the last 72 hours. Coagulation No results for input(s): PTP, INR, APTT in the last 72 hours. No lab exists for component: INREXT    Liver Enzymes No results for input(s): TP, ALB, TBIL, AP, SGOT, GPT in the last 72 hours. No lab exists for component: DBIL       Assessment/Plan: S/P  conversion from lap band to laparoscopic sleeve gastrectomy - doing well without any issues    Orders are pending until UGI study shows normal anatomy. 1.Start bariatric diet and protein shakes  2. D/C IV pain meds and start PO pain meds  3. D/C HOLLY drain  4. Likely PM D/C if continues to be okay and tolerates PO  5. Encourage ambulation

## 2018-04-19 ENCOUNTER — OFFICE VISIT (OUTPATIENT)
Dept: SURGERY | Age: 58
End: 2018-04-19

## 2018-04-19 VITALS
DIASTOLIC BLOOD PRESSURE: 78 MMHG | HEART RATE: 89 BPM | OXYGEN SATURATION: 95 % | SYSTOLIC BLOOD PRESSURE: 114 MMHG | WEIGHT: 315 LBS | HEIGHT: 77 IN | BODY MASS INDEX: 37.19 KG/M2

## 2018-04-19 DIAGNOSIS — K75.81 NASH (NONALCOHOLIC STEATOHEPATITIS): ICD-10-CM

## 2018-04-19 DIAGNOSIS — Z86.79 HISTORY OF ATRIAL FIBRILLATION: ICD-10-CM

## 2018-04-19 DIAGNOSIS — K90.9 INTESTINAL MALABSORPTION, UNSPECIFIED TYPE: Primary | ICD-10-CM

## 2018-04-19 DIAGNOSIS — Z98.84 S/P LAPAROSCOPIC SLEEVE GASTRECTOMY: ICD-10-CM

## 2018-04-19 PROBLEM — E66.01 MORBID OBESITY WITH BMI OF 45.0-49.9, ADULT (HCC): Status: RESOLVED | Noted: 2018-04-05 | Resolved: 2018-04-19

## 2018-04-19 NOTE — PROGRESS NOTES
Subjective:      Rena Calvin is a 66 y.o. male is now 2 weeks status post laparoscopic adjustable gastric band surgery. Doing well overall. He has lost a total of 29 pounds since surgery. Body mass index is 38.79 kg/(m^2). Currently on a liquid diet without difficulty. Taking in 64oz water daily. Sources of protein include protein shakes. 15-20 min of activity 5 days a week, including walking. Bowel movements are regular. The patient is not having any pain. . The patient is compliant with multivitamins. Surgery related complications: NA.  Liver bx report reviewed with patient. Stomach bx report reviewed with patient. Liver bx with steatohepatitis and fibrosis, already has appt with Makenzie in June from when PCP referred for elevated bili.     Weight Loss Metrics 4/19/2018 4/5/2018 3/26/2018 3/15/2018 10/12/2017 9/28/2017 9/20/2017   Pre op / Initial Wt - - - - - - -   Today's Wt 327 lb 1.6 oz 350 lb 3 oz 353 lb 350 lb 351 lb 1.6 oz 350 lb 3 oz 345 lb   BMI 38.79 kg/m2 41.53 kg/m2 41.86 kg/m2 41.5 kg/m2 41.63 kg/m2 41.53 kg/m2 40.91 kg/m2   Ideal Body Wt - - - - - - -          Comorbidities:    Hypertension: not applicable, on medication for afib, no dizziness , lightheadedness or palpitations  Diabetes: improved, had BS of 51, no symptoms, goes 12+ hours without intake   Obstructive Sleep Apnea: unchanged  Hyperlipidemia: unchanged  Stress Urinary Incontinence: not applicable  Gastroesophageal Reflux: not applicable  Weight related arthropathy:unchanged     Patient Active Problem List   Diagnosis Code    High triglycerides E78.1    Sleep apnea G47.30    Chronic back pain M54.9, G89.29    Hypertension I10    Atrial fibrillation (HCC) I48.91    Morbid obesity ([address] E66.01    History of atrial fibrillation Z86.79    Depression F32.9    SHAW (nonalcoholic steatohepatitis) K75.81    H/O laparoscopic adjustable gastric banding Z98.84    H/O lumbosacral spine surgery Z98.890    S/P appendectomy Z90.49    History of removal of laparoscopic gastric banding device Z98.84    BMI 40.0-44.9, adult ([facility] Z68.41    Morbid obesity with BMI of 45.0-49.9, adult ([facility]) E66.01, Z68.42        Past Medical History:   Diagnosis Date    Anxiety     Depression     Essential hypertension, benign 8/1/2012    High triglycerides 8/1/2012    History of atrial fibrillation     cardioverted / no recurrance    Hypercholesterolemia     PT denies this at triage 11/19/12    Hypertension 2012    No meds    Morbid obesity ([facility]     Morbid obesity with BMI of 40.0-44.9, adult ([facility])     Nausea & vomiting 1990    Pre-diabetes     Sleep apnea     instructed to bring CPAP day of Surgery; rate 12       Past Surgical History:   Procedure Laterality Date    CARDIAC CATHETERIZATION  1/2013    HX APPENDECTOMY  1990    HX HEENT      UPPP    HX LUMBAR DISKECTOMY      HX SHOULDER ARTHROSCOPY Left 2017    LAP, PLACE ADJUST GASTR BAND      removed 2017       Current Outpatient Prescriptions   Medication Sig Dispense Refill    multivitamin with iron (FLINTSTONES) chewable tablet Take 1 Tab by mouth daily.  Omeprazole delayed release (PRILOSEC D/R) 20 mg tablet Take 1 Tab by mouth daily. OTC 30 Tab 1    ARIPiprazole (ABILIFY) 5 mg tablet Take 5 mg by mouth daily. Indications: anxiety and depression      temazepam (RESTORIL) 30 mg capsule Take 30 mg by mouth nightly as needed for Sleep.  TESTOSTERONE (FORTESTA TD) 4 Pump(s) by TransDERmal route daily. Indications: Low T      DOFETILIDE (TIKOSYN PO) Take 0.5 mg by mouth two (2) times a day. A-Fib      multivitamin (ONE A DAY) tablet Take 1 Tab by mouth daily.  cetirizine (ZYRTEC) 10 mg tablet Take 10 mg by mouth daily. Indications: Allergic Rhinitis      buPROPion SR (WELLBUTRIN SR) 150 mg SR tablet Take 300 mg by mouth daily.  300 XR  Indications: ANXIETY WITH DEPRESSION      oxyCODONE-acetaminophen (PERCOCET) 5-325 mg per tablet Take 1 Tab by mouth every four (4) hours as needed for Pain. Max Daily Amount: 6 Tabs. 30 Tab 0       No Known Allergies    Review of Symptoms:       General - No history or complaints of unexpected fever or chills  Head/Neck - No history or complaints of headache or dizziness  Cardiac - No history or complaints of chest pain, palpitations, or shortness of breath  Pulmonary - No history or complaints of shortness of breath or productive cough  Gastrointestinal - as noted above  Genitourinary - No history or complaints of hematuria/dysuria or renal lithiasis  Musculoskeletal - No history or complaints of joint  muscular weakness  Hematologic - No history of any bleeding episodes  Neurologic - No history or complaints of  migraine headaches or neurologic symptoms        Objective:     Visit Vitals    /78 (BP 1 Location: Left arm, BP Patient Position: Sitting)    Pulse 89    Ht 6' 5\" (1.956 m)    Wt 148.4 kg (327 lb 1.6 oz)    SpO2 95%    BMI 38.79 kg/m2       General:  alert, cooperative, no distress, appears stated age   Chest: lungs clear to auscultation, breath sounds equal and symmetric, no rhonchi, rales or wheezes, no accessory muscle use   Cor:   Regular rate and rhythm, S1S2 present or without murmur or extra heart sounds   Abdomen: soft, bowel sounds active, non-tender, no masses or organomegaly   Incisions:   healing well, no drainage, no erythema, no hernia, no seroma, no swelling, no dehiscence, incision well approximated, suture removed from RUQ incision       Assessment:   History of Morbid obesity, status post conversion from lap band to lap sleeve. Doing well postoperatively. Steatohepatitis - already h as [facility] appt scheduled  Asymptomatic hypoglycemia - add evening snack and monitor BS, f/u with PCP    Plan:     1. Increase activity to the goal of 30 minutes daily  2. Advance diet to soft solid phase. Reminded to measure portions, continue high protein, low carbohydrate diet.   Reminded to eat regularly, to eat slowly & not to drink with meals. Refer to the handbook given in class. 3. Continue multivitamin   4. Continue current medications and follow up with PCP for management of regimen. 5. Encouraged to attend support group   6. I have discussed this plan with patient and they verbalized understanding  7. Follow up in 2 weeks or sooner if patient has questions, concerns or worsening of condition, if unable to reach our office, patient should report to the ED. 8. Mr. Gladis Ohara has a reminder for a \"due or due soon\" health maintenance. I have asked that he contact his primary care provider for a follow-up on this health maintenance.

## 2018-04-19 NOTE — PATIENT INSTRUCTIONS
Continue focus on hydration. May advance to soft diet. Please review material in your binder. Remember - your motto is \"soft foods with protein\". Eat regularly. Continue to use protein shakes. Remember to eat slowly & not to drink fluids with your meals. Increase activity as able - cardio (walking) only. Continue to take multi-vitamins. Continue regular follow-up with your PCP. Return to office in 2 weeks for your next appointment. Call the office if you have any questions or concerns. Walking for Exercise: Care Instructions  Your Care Instructions    Walking is one of the easiest ways to get the exercise you need for good health. A brisk, 30-minute walk each day can help you feel better and have more energy. It can help you lower your risk of disease. Walking can help you keep your bones strong and your heart healthy. Check with your doctor before you start a walking plan if you have heart problems, other health issues, or you have not been active in a long time. Follow your doctor's instructions for safe levels of exercise. Follow-up care is a key part of your treatment and safety. Be sure to make and go to all appointments, and call your doctor if you are having problems. It's also a good idea to know your test results and keep a list of the medicines you take. How can you care for yourself at home? Getting started  · Start slowly and set a short-term goal. For example, walk for 5 or 10 minutes every day. · Bit by bit, increase the amount you walk every day. Try for at least 30 minutes on most days of the week. You also may want to swim, bike, or do other activities. · If finding enough time is a problem, it is fine to be active in blocks of 10 minutes or more throughout your day and week. · To get the heart-healthy benefits of walking, you need to walk briskly enough to increase your heart rate and breathing, but not so fast that you cannot talk comfortably.   · Wear comfortable shoes that fit well and provide good support for your feet and ankles. Staying with your plan  · After you've made walking a habit, set a longer-term goal. You may want to set a goal of walking briskly for longer or walking farther. Experts say to do 2½ hours of moderate activity a week. A faster heartbeat is what defines moderate-level activity. · To stay motivated, walk with friends, coworkers, or pets. · Use a phone zeyad or pedometer to track your steps each day. Set a goal to increase your steps. Once you get there, set a higher goal. Aim for 10,000 steps a day. · If the weather keeps you from walking outside, go for walks at the mall with a friend. Local schools and churches may have indoor gyms where you can walk. Fitting a walk into your workday  · Park several blocks away from work, or get off the bus a few stops early. · Use the stairs instead of the elevator, at least for a few floors. · Suggest holding meetings with colleagues during a walk inside or outside the building. · Use the restroom that is the farthest from your desk or workstation. · Use your morning and afternoon breaks to take quick 15-minute walks. Staying safe  · Know your surroundings. Walk in a well-lighted, safe place. If it is dark, walk with a partner. Wear light-colored clothing. If you can, buy a vest or jacket that reflects light. · Carry a cell phone for emergencies. · Drink plenty of water. Take a water bottle with you when you walk. This is very important if it is hot out. · Be careful not to slip on wet or icy ground. You can buy \"grippers\" for your shoes to help keep you from slipping. · Pay attention to your walking surface. Use sidewalks and paths. · If you have breathing problems like asthma or COPD, ask your doctor when it is safe for you to walk outdoors. Cold, dry air, smog, pollen, or other things in the air could cause breathing problems. Where can you learn more?   Go to http://royer-neil.info/. Enter R159 in the search box to learn more about \"Walking for Exercise: Care Instructions. \"  Current as of: March 13, 2017  Content Version: 11.4  © 2739-8056 [employer], [employer]. Care instructions adapted under license by [employer] (which disclaims liability or warranty for this information). If you have questions about a medical condition or this instruction, always ask your healthcare professional. [place] [address] any warranty or liability for your use of this information.

## 2018-05-03 ENCOUNTER — OFFICE VISIT (OUTPATIENT)
Dept: SURGERY | Age: 58
End: 2018-05-03

## 2018-05-03 VITALS
DIASTOLIC BLOOD PRESSURE: 72 MMHG | SYSTOLIC BLOOD PRESSURE: 117 MMHG | OXYGEN SATURATION: 100 % | WEIGHT: 315 LBS | BODY MASS INDEX: 37.19 KG/M2 | HEIGHT: 77 IN | TEMPERATURE: 97.7 F | HEART RATE: 58 BPM

## 2018-05-03 DIAGNOSIS — E66.01 SEVERE OBESITY (BMI 35.0-39.9) WITH COMORBIDITY (HCC): Primary | ICD-10-CM

## 2018-05-03 DIAGNOSIS — K90.9 INTESTINAL MALABSORPTION, UNSPECIFIED TYPE: Primary | ICD-10-CM

## 2018-05-03 DIAGNOSIS — Z98.84 S/P LAPAROSCOPIC SLEEVE GASTRECTOMY: ICD-10-CM

## 2018-05-03 NOTE — PROGRESS NOTES
Subjective:      Bridgett Batista is a 66 y.o. male is now 1 months status post conversion of lap band to sleeve gastrectomy. Doing well overall. He has lost a total of 31 pounds since surgery. Body mass index is 38.2 kg/(m^2). Has lost 18% of EBW. Currently on a soft food diet without difficulty, reports no issues and denies vomiting and abdominal pain. Taking in 100oz water daily. Sources of protein include shakes, eggs, meat . 30 min of activity 7 days a week, including walking. Bowel movements are regular. The patient is not having any pain. . The patient is compliant with multivitamins. Weight Loss Metrics 5/3/2018 4/19/2018 4/5/2018 3/26/2018 3/15/2018 10/12/2017 9/28/2017   Pre op / Initial Wt - - - - - - -   Today's Wt 322 lb 1.6 oz 327 lb 1.6 oz 350 lb 3 oz 353 lb 350 lb 351 lb 1.6 oz 350 lb 3 oz   BMI 38.2 kg/m2 38.79 kg/m2 41.53 kg/m2 41.86 kg/m2 41.5 kg/m2 41.63 kg/m2 41.53 kg/m2   Ideal Body Wt - - - - - - -          Comorbidities:    Hypertension: not applicable  Diabetes: improved , has only had levels checked twice, is off meds  Obstructive Sleep Apnea: unchanged, is using CPAP  Hyperlipidemia: unchanged  Stress Urinary Incontinence: not applicable  Gastroesophageal Reflux: not applicable  Weight related arthropathy:improved, left hip and bilateral knee pain     Patient Active Problem List   Diagnosis Code    High triglycerides E78.1    Sleep apnea G47.30    Chronic back pain M54.9, G89.29    Hypertension I10    Atrial fibrillation (HCC) I48.91    History of atrial fibrillation Z86.79    Depression F32.9    SHAW (nonalcoholic steatohepatitis) K75.81    H/O laparoscopic adjustable gastric banding Z98.84    H/O lumbosacral spine surgery Z98.890    S/P appendectomy Z90.49    History of removal of laparoscopic gastric banding device Z98.84    Severe obesity (BMI 35.0-39. 9) with comorbidity ([address] E66.01        Past Medical History:   Diagnosis Date    Anxiety     Depression     Essential hypertension, benign 8/1/2012    High triglycerides 8/1/2012    History of atrial fibrillation     cardioverted / no recurrance    Hypercholesterolemia     PT denies this at triage 11/19/12    Hypertension 2012    No meds    Morbid obesity ([facility] [address]     Morbid obesity with BMI of 40.0-44.9, adult (HCC)     Nausea & vomiting 1990    Pre-diabetes     Sleep apnea     instructed to bring CPAP day of Surgery; rate 12       Past Surgical History:   Procedure Laterality Date    CARDIAC CATHETERIZATION  1/2013    HX APPENDECTOMY  1990    HX HEENT      UPPP    HX LUMBAR DISKECTOMY      HX SHOULDER ARTHROSCOPY Left 2017    LAP, PLACE ADJUST GASTR BAND      removed 2017       Current Outpatient Prescriptions   Medication Sig Dispense Refill    multivitamin with iron (FLINTSTONES) chewable tablet Take 1 Tab by mouth daily.  Omeprazole delayed release (PRILOSEC D/R) 20 mg tablet Take 1 Tab by mouth daily. OTC 30 Tab 1    ARIPiprazole (ABILIFY) 5 mg tablet Take 5 mg by mouth daily. Indications: anxiety and depression      temazepam (RESTORIL) 30 mg capsule Take 30 mg by mouth nightly as needed for Sleep.  TESTOSTERONE (FORTESTA TD) 4 Pump(s) by TransDERmal route daily. Indications: Low T      DOFETILIDE (TIKOSYN PO) Take 0.5 mg by mouth two (2) times a day. A-Fib      cetirizine (ZYRTEC) 10 mg tablet Take 10 mg by mouth daily. Indications: Allergic Rhinitis      buPROPion SR (WELLBUTRIN SR) 150 mg SR tablet Take 300 mg by mouth daily.  300 XR  Indications: ANXIETY WITH DEPRESSION         No Known Allergies      Objective:     Visit Vitals    /72 (BP 1 Location: Left arm, BP Patient Position: Sitting)    Pulse (!) 58    Temp 97.7 °F (36.5 °C) (Temporal)    Ht 6' 5\" (1.956 m)    Wt 146.1 kg (322 lb 1.6 oz)    SpO2 100%    BMI 38.2 kg/m2       General:  alert, cooperative, no distress, appears stated age   Chest: lungs clear to auscultation, no rhonchi, rales or wheezes, no accessory muscle use   Cor:   Regular rate and rhythm or without murmur or extra heart sounds   Abdomen: soft, bowel sounds active, non-tender, no masses or organomegaly   Incisions:   healing well, no drainage, no erythema, no hernia, no seroma, no swelling, no dehiscence, incision well approximated       Assessment:   History of Morbid obesity, status post conversion of lap band to sleeve gastrectomy. Doing well postoperatively. Plan:       1. Can stop probiotic    2. Advance diet to solid phase. Reminded to measure portions, continue high protein, low carbohydrate diet. Reminded to eat regularly, to eat slowly & not to drink with meals. Refer to the handbook given in class. 3. Patient has appt with dietician directly after this visit. 4. Continue multivitamin and add the additional vitamin supplementation (Ca, B complex, B12, D, all listed in handbook)  5. Continue current medications and follow up with PCP for management of regimen. 6. Continue cardio exercise and add resistance exercises. Discussed with patient. Minimum of 30 minutes of exercise daily. 7. Encouraged to attend support group   8. I have discussed this plan with patient and they verbalized understanding  9. Follow up in 1 months or sooner if patient has questions, concerns or worsening of condition, if unable to reach our office, patient should report to the ED. 10. Mr. Charu Alexander has a reminder for a \"due or due soon\" health maintenance. I have asked that he contact his primary care provider for a follow-up on this health maintenance.

## 2018-05-03 NOTE — MR AVS SNAPSHOT
Ray Pema 
 
 
 One [facility] [address] [facility] 
[phone] Patient: Edward Elena MRN: [id number]  Visit Information Date & Time Provider Department Dept. Phone Encounter #  
 5/3/2018 11:00 AM Laura Bishop, [address] [facility] [place] [phone] [id number] Follow-up Instructions Return in about 1 month (around 6/3/2018). Follow-up and Disposition History Your Appointments 2018 11:00 AM  
POST OP with MD Isiah Gr [facility] ([facility]) Appt Note: 2 month f/u  
 One [facility] [address] [facility] [address]  
[phone]  
  
   
 [address] [address] [facility]   
  
    
 2018 11:00 AM  
Follow Up with Karli Aiken MD  
[address] [facility] ([facility]) Appt Note: f/up per Dr. Nidhi Zamora . Sakshi Loco Elevated Liver Enzymes; r/s  
 One [facility], [facility] [address] [facility] [address] [facility]  
  
   
 One [facility], [address] [place] [address] Upcoming Health Maintenance Date Due DTaP/Tdap/Td series (1 - Tdap) 1983 FOBT Q 1 YEAR AGE 50-75 2012 Influenza Age 5 to Adult 2018 Allergies as of 5/3/2018  Review Complete On: 5/3/2018 By: NORMAN Luo No Known Allergies Current Immunizations  Reviewed on 2018 No immunizations on file. Not reviewed this visit You Were Diagnosed With   
  
 Codes Comments Intestinal malabsorption, unspecified type    -  Primary ICD-10-CM: K90.9 ICD-9-CM: 579.9 S/P laparoscopic sleeve gastrectomy     ICD-10-CM: W29.43 ICD-9-CM: V45.86 Vitals BP Pulse Temp Height(growth percentile) 117/72 (BP 1 Location: Left arm, BP Patient Position: Sitting) (!) 58 97.7 °F (36.5 °C) (Temporal) 6' 5\" (1.956 m) Weight(growth percentile) SpO2 BMI Smoking Status 322 lb 1.6 oz (146.1 kg) 100% 38.2 kg/m2 Never Smoker BMI and BSA Data Body Mass Index Body Surface Area  
 38.2 kg/m 2 2.82 m 2 Preferred Pharmacy Pharmacy Name Phone [facility] Johanny Bain [phone] Your Updated Medication List  
  
   
This list is accurate as of 5/3/18 12:04 PM.  Always use your most recent med list.  
  
  
  
  
 ABILIFY 5 mg tablet Generic drug:  ARIPiprazole Take 5 mg by mouth daily. Indications: anxiety and depression buPROPion  mg SR tablet Commonly known as:  Bunny Sherwin Take 300 mg by mouth daily. 300 XR  Indications: ANXIETY WITH DEPRESSION  
  
 FORTESTA TD  
4 Pump(s) by TransDERmal route daily. Indications: Low T  
  
 multivitamin with iron chewable tablet Commonly known as:  [place] Take 1 Tab by mouth daily. Omeprazole delayed release 20 mg tablet Commonly known as:  PRILOSEC D/R Take 1 Tab by mouth daily. OTC  
  
 temazepam 30 mg capsule Commonly known as:  RESTORIL Take 30 mg by mouth nightly as needed for Sleep. TIKOSYN PO Take 0.5 mg by mouth two (2) times a day. A-Fib ZyrTEC 10 mg tablet Generic drug:  cetirizine Take 10 mg by mouth daily. Indications: Allergic Rhinitis Follow-up Instructions Return in about 1 month (around 6/3/2018). Patient Instructions Patient Instructions 1. Remember hydration goals - minimum of 64 ounces of liquids per day (dehydration is the number one reason for hospital readmission). 2. Continue to monitor carbohydrate and protein intake you need a minimum of  Grams of protein daily- remember to keep your total carbohydrates to 50 grams or less per day for best results.  
3. Continue to work towards exercise goals - 60-90 minutes, 5 times a week minimum of deliberate, aerobic exercise is the ultimate goal with strength training 2 times each week. Refer to [employer] for  information. 4. Remember to take vitamins as directed in your handbook. 5. Attend support group the 2nd Thursday of each month. 6. Constipation: Milk of Magnesia is for immediate relief. Miralax is to be used every day if constipation is a chronic problem. 7. Diarrhea: patients will occasionally develop lactose intolerance after surgery. Check to see if your protein shake has whey in it. If it does try one that does not have whey and stop all yogurts, cheeses and milks to see if the diarrhea goes away. 8. Call us at [phone] or email us through [email or web address]" with questions,     concerns or worsening of condition, we have someone on call 24 hours a day. If you are unable to reach our office, you are to go to your Primary Care Physician or the Emergency Department. Supplement Resource Guide Importance of Protein:  
Maintains lean body mass, produces antibodies to fight off infections, heals wounds, minimizes hair loss, helps to give you energy, helps with satiety, and keeping you full between meals. Importance of Calcium: 
Needed for healthy bones and teeth, normal blood clotting, and nervous system functioning, higher risk of osteoporosis and bone disease with non-compliance. Importance of Multivitamins: Many functions. Supply you with extra nutrients that you may be missing from food. May lead to iron deficiency anemia, weakness, fatigue, and many other symptoms with non-compliance. Importance of B Vitamins: 
Important for red blood cell formation, metabolism, energy, and helps to maintain a healthy nervous system. Protein Supplement Find one you like now. Use immediately after surgery. Look for: 
35-50g protein each day from your protein supplement once you reach the progression diet. 0-3 g fat per serving 0-3 g sugar per serving Protein drinks should be split in separate dosages. Recommend: Lifelong 1 year + Calcium Supplement:  
 
Start taking within a month after surgery. Look for: Calcium Citrate Plus D (1500 mg per day) Recommend: Citracal 
 
 . Avoid chocolate chewable calcium. Can use chewable bariatric or GNC brand or similar chewable. The body cannot absorb more than 500-600 mg of calcium at a time. Take for Life Multi-vitamin Supplement:   
 
Start immediately after surgery: any complete chewable, such as: [place]s Complete chewables. Avoid [place] sours or gummies. They lack iron and other important nutrients and also have added sugar. Continue with chewable vitamin or change to adult complete multivitamin one month after surgery. Menstruating women can take a prenatal vitamin. Make sure has at least 18 mg iron and 015-518 mcg folic acid Vitamin B12, B Complex Vitamin, and Biotin Start taking within a month after surgery. Vitamin B12:  1000 mcg of Vitamin B12 three times weekly Must take sublingually (meaning you take it under your tongue) or in a liquid drop form for easy absorption. B Complex Vitamin: Take a pill or liquid drop form once daily. Biotin: This vitamin can help prevent hair loss. Recommend 5mg  
(5000 mcg) a day Biotin is Optional  
 
 
 
 
 
 
  
Introducing [facility]! Jania Jackson introduces Invup patient portal. Now you can access parts of your medical record, email your doctor's office, and request medication refills online. 1. In your internet browser, go to [email or web address] 2. Click on the First Time User? Click Here link in the Sign In box. You will see the New Member Sign Up page. 3. Enter your Invup Access Code exactly as it appears below. You will not need to use this code after youve completed the sign-up process.  If you do not sign up before the expiration date, you must request a new code. · [employer] Access Code: [email or web address] Expires: 7/3/2018 11:06 AM 
 
4. Enter the last four digits of your Social Security Number (xxxx) and Date of Birth (mm/dd/yyyy) as indicated and click Submit. You will be taken to the next sign-up page. 5. Create a [employer] ID. This will be your [employer] login ID and cannot be changed, so think of one that is secure and easy to remember. 6. Create a [employer] password. You can change your password at any time. 7. Enter your Password Reset Question and Answer. This can be used at a later time if you forget your password. 8. Enter your e-mail address. You will receive e-mail notification when new information is available in [address] 9. Click Sign Up. You can now view and download portions of your medical record. 10. Click the Download Summary menu link to download a portable copy of your medical information. If you have questions, please visit the Frequently Asked Questions section of the [employer] website. Remember, [employer] is NOT to be used for urgent needs. For medical emergencies, dial 911. Now available from your iPhone and Android! Please provide this summary of care documentation to your next provider. Your primary care clinician is listed as Caio Bassett. If you have any questions after today's visit, please call [phone].

## 2018-05-03 NOTE — PATIENT INSTRUCTIONS
Patient Instructions      1. Remember hydration goals - minimum of 64 ounces of liquids per day (dehydration is the number one reason for hospital readmission). 2. Continue to monitor carbohydrate and protein intake you need a minimum of  Grams of protein daily- remember to keep your total carbohydrates to 50 grams or less per day for best results. 3. Continue to work towards exercise goals - 60-90 minutes, 5 times a week minimum of deliberate, aerobic exercise is the ultimate goal with strength training 2 times each week. Refer to [employer] for  information. 4. Remember to take vitamins as directed in your handbook. 5. Attend support group the 2nd Thursday of each month. 6. Constipation: Milk of Magnesia is for immediate relief. Miralax is to be used every day if constipation is a chronic problem. 7. Diarrhea: patients will occasionally develop lactose intolerance after surgery. Check to see if your protein shake has whey in it. If it does try one that does not have whey and stop all yogurts, cheeses and milks to see if the diarrhea goes away. 8. Call us at [phone] or email us through [facility]" with questions,     concerns or worsening of condition, we have someone on call 24 hours a day. If you are unable to reach our office, you are to go to your Primary Care Physician or the Emergency Department. Supplement Resource Guide    Importance of Protein:   Maintains lean body mass, produces antibodies to fight off infections, heals wounds, minimizes hair loss, helps to give you energy, helps with satiety, and keeping you full between meals. Importance of Calcium:  Needed for healthy bones and teeth, normal blood clotting, and nervous system functioning, higher risk of osteoporosis and bone disease with non-compliance. Importance of Multivitamins: Many functions.   Supply you with extra nutrients that you may be missing from food. May lead to iron deficiency anemia, weakness, fatigue, and many other symptoms with non-compliance. Importance of B Vitamins:  Important for red blood cell formation, metabolism, energy, and helps to maintain a healthy nervous system. Protein Supplement  Find one you like now. Use immediately after surgery. Look for:  35-50g protein each day from your protein supplement once you reach the progression diet. 0-3 g fat per serving  0-3 g sugar per serving    Protein drinks should be split in separate dosages. Recommend: Lifelong  1 year + Calcium Supplement:     Start taking within a month after surgery. Look for: Calcium Citrate Plus D (1500 mg per day)  Recommend: Citracal     .            Avoid chocolate chewable calcium. Can use chewable bariatric or GNC brand or similar chewable. The body cannot absorb more than 500-600 mg of calcium at a time. Take for Life Multi-vitamin Supplement:      Start immediately after surgery: any complete chewable, such as: [place]s Complete chewables. Avoid [place] sours or gummies. They lack iron and other important nutrients and also have added sugar. Continue with chewable vitamin or change to adult complete multivitamin one month after surgery. Menstruating women can take a prenatal vitamin. Make sure has at least 18 mg iron and 859-968 mcg folic acid   Vitamin A18, B Complex Vitamin, and Biotin  Start taking within a month after surgery. Vitamin B12:  1000 mcg of Vitamin B12 three times weekly    Must take sublingually (meaning you take it under your tongue) or in a liquid drop form for easy absorption. B Complex Vitamin: Take a pill or liquid drop form once daily. Biotin: This vitamin can help prevent hair loss.     Recommend 5mg   (5000 mcg) a day  Biotin is Optional

## 2018-05-03 NOTE — PROGRESS NOTES
Pt given one on one diet education. Appears to have a good understanding of the diet progression, food choices, and dietary/exercise habits for successful weight loss and nourishment one month after surgery. The  material included: post-op diet progression and portion sizes (including low fat, low sugar food recommendations and emphasis on protein foods and protein supplements), good beverage choices, reading a food label, vitamins/minerals required after weight loss surgery, and encouraging dietary and exercise habits that lead to weight loss success. Pt also received a restaurant card, which tells restaurants that the patient had a procedure that decreases the size of their stomach so the restaurant may let them order off the children's menu, the senior's menu, or a smaller portion for a reduced rate.      Kadeem Peña, RD

## 2018-06-08 ENCOUNTER — OFFICE VISIT (OUTPATIENT)
Dept: SURGERY | Age: 58
End: 2018-06-08

## 2018-06-08 VITALS
BODY MASS INDEX: 36.56 KG/M2 | WEIGHT: 309.6 LBS | RESPIRATION RATE: 16 BRPM | TEMPERATURE: 97.7 F | SYSTOLIC BLOOD PRESSURE: 137 MMHG | OXYGEN SATURATION: 94 % | HEART RATE: 83 BPM | HEIGHT: 77 IN | DIASTOLIC BLOOD PRESSURE: 66 MMHG

## 2018-06-08 DIAGNOSIS — K90.9 INTESTINAL MALABSORPTION, UNSPECIFIED TYPE: Primary | ICD-10-CM

## 2018-06-08 DIAGNOSIS — Z98.84 S/P LAPAROSCOPIC SLEEVE GASTRECTOMY: ICD-10-CM

## 2018-06-08 NOTE — PROGRESS NOTES
Subjective:     Shyann Woodall  is a 66 y.o. male who presents for follow-up about 2 months following laparoscopic sleeve gastrectomy. He has lost a total of 46 pounds since surgery. Body mass index is 36.71 kg/(m^2). Shraddha Iyer Weight Loss Metrics 6/8/2018 5/3/2018 4/19/2018 4/5/2018 3/26/2018 3/15/2018 10/12/2017   Pre op / Initial Wt - - - - - - -   Today's Wt 309 lb 9.6 oz 322 lb 1.6 oz 327 lb 1.6 oz 350 lb 3 oz 353 lb 350 lb 351 lb 1.6 oz   BMI 36.71 kg/m2 38.2 kg/m2 38.79 kg/m2 41.53 kg/m2 41.86 kg/m2 41.5 kg/m2 41.63 kg/m2   Ideal Body Wt - - - - - - -       Surgery related complication: none       He reports no issues and denies vomiting and abdominal pain. Patients pain score:0    Weight Loss Metrics 6/8/2018 5/3/2018 4/19/2018 4/5/2018 3/26/2018 3/15/2018 10/12/2017   Pre op / Initial Wt - - - - - - -   Today's Wt 309 lb 9.6 oz 322 lb 1.6 oz 327 lb 1.6 oz 350 lb 3 oz 353 lb 350 lb 351 lb 1.6 oz   BMI 36.71 kg/m2 38.2 kg/m2 38.79 kg/m2 41.53 kg/m2 41.86 kg/m2 41.5 kg/m2 41.63 kg/m2   Ideal Body Wt - - - - - - -          The patient's exercise level: very active. Changes in his medical history and medications have been reviewed. Patient Active Problem List   Diagnosis Code    High triglycerides E78.1    Sleep apnea G47.30    Chronic back pain M54.9, G89.29    Hypertension I10    Atrial fibrillation (HCC) I48.91    History of atrial fibrillation Z86.79    Depression F32.9    SHAW (nonalcoholic steatohepatitis) K75.81    S/P laparoscopic sleeve gastrectomy Z98.84    H/O lumbosacral spine surgery Z98.890    S/P appendectomy Z90.49    History of removal of laparoscopic gastric banding device Z98.84    Severe obesity (BMI 35.0-39. 9) with comorbidity ([facility] [address] E66.01    Intestinal malabsorption K90.9     Past Medical History:   Diagnosis Date    Anxiety     Depression     Essential hypertension, benign 8/1/2012    High triglycerides 8/1/2012    History of atrial fibrillation     cardioverted / no recurrance    Hypercholesterolemia     PT denies this at triage 11/19/12    Hypertension 2012    No meds    Morbid obesity ([facility] [address]     Morbid obesity with BMI of 40.0-44.9, adult (HCC)     Nausea & vomiting 1990    Pre-diabetes     Sleep apnea     instructed to bring CPAP day of Surgery; rate 12     Past Surgical History:   Procedure Laterality Date    CARDIAC CATHETERIZATION  1/2013    HX APPENDECTOMY  1990    HX HEENT      UPPP    HX LUMBAR DISKECTOMY      HX SHOULDER ARTHROSCOPY Left 2017    LAP, PLACE ADJUST GASTR BAND      removed 2017    OH LAP, BENITEZ RESTRICT PROC, LONGITUDINAL GASTRECTOMY      4/5/2018     Current Outpatient Prescriptions   Medication Sig Dispense Refill    multivitamin with iron (FLINTSTONES) chewable tablet Take 1 Tab by mouth daily.  Omeprazole delayed release (PRILOSEC D/R) 20 mg tablet Take 1 Tab by mouth daily. OTC 30 Tab 1    ARIPiprazole (ABILIFY) 5 mg tablet Take 5 mg by mouth daily. Indications: anxiety and depression      temazepam (RESTORIL) 30 mg capsule Take 30 mg by mouth nightly as needed for Sleep.  TESTOSTERONE (FORTESTA TD) 4 Pump(s) by TransDERmal route daily. Indications: Low T      DOFETILIDE (TIKOSYN PO) Take 0.5 mg by mouth two (2) times a day. A-Fib      cetirizine (ZYRTEC) 10 mg tablet Take 10 mg by mouth daily. Indications: Allergic Rhinitis      buPROPion SR (WELLBUTRIN SR) 150 mg SR tablet Take 300 mg by mouth daily.  300 XR  Indications: ANXIETY WITH DEPRESSION         Objective:     Visit Vitals    /66 (BP 1 Location: Left arm, BP Patient Position: Sitting)    Pulse 83    Temp 97.7 °F (36.5 °C)    Resp 16    Ht 6' 5\" (1.956 m)    Wt 140.4 kg (309 lb 9.6 oz)    SpO2 94%    BMI 36.71 kg/m2        Physical Exam:    General:  alert, cooperative, no distress, appears stated age   Lungs:   clear to auscultation bilaterally   Heart:  Regular rate and rhythm   Abdomen:   abdomen is soft without significant tenderness, masses, organomegaly or guarding; Incisions: healing well         Assessment:     1. History of Morbid obesity, status post  laparoscopic sleeve gastrectomy. Doing well; no concerns. .     Plan:     1. Remember to measure portions, continue low carbohydrate diet  2. Tolerated transition to solids without issue  3. Remember vitamin supplements. 4. Exercise regimen appears adequate. 5. Attend support group  6. Follow-up in 2 month(s). 7. Total time spent with the patient 20 minutes.

## 2018-06-08 NOTE — MR AVS SNAPSHOT
303 [facility] 
 
 
 [address] [facility] 
[phone] Patient: Rosemary Rivers MRN: [id number] HNF:8/31/1959 Visit Information Date & Time Provider Department Dept. Phone Encounter #  
 6/8/2018 11:00 AM Andrés Harris [facility] [place] [phone] [id number] Follow-up Instructions Return in about 2 months (around 8/8/2018). Follow-up and Disposition History Your Appointments 6/25/2018 11:00 AM  
Follow Up with Justa Jack MD  
[id number] [facility] ([facility]) Appt Note: f/up per Dr. Diana Mirza . Floresita Griffin Elevated Liver Enzymes; r/s  
 [address], [address] [facility] [address], [address] [place] [address] Upcoming Health Maintenance Date Due DTaP/Tdap/Td series (1 - Tdap) 2/21/1983 FOBT Q 1 YEAR AGE 50-75 2/21/2012 Influenza Age 5 to Adult 8/1/2018 Allergies as of 6/8/2018  Review Complete On: 6/8/2018 By: Irving Woo LPN No Known Allergies Current Immunizations  Reviewed on 4/5/2018 No immunizations on file. Not reviewed this visit You Were Diagnosed With   
  
 Codes Comments Intestinal malabsorption, unspecified type    -  Primary ICD-10-CM: K90.9 ICD-9-CM: 579.9 S/P laparoscopic sleeve gastrectomy     ICD-10-CM: E78.95 ICD-9-CM: V45.86 Vitals BP Pulse Temp Resp Height(growth percentile) Weight(growth percentile)  
 137/66 (BP 1 Location: Left arm, BP Patient Position: Sitting) 83 97.7 °F (36.5 °C) 16 6' 5\" (1.956 m) 309 lb 9.6 oz (140.4 kg) SpO2 BMI Smoking Status 94% 36.71 kg/m2 Never Smoker Vitals History BMI and BSA Data Body Mass Index Body Surface Area  
 36.71 kg/m 2 2.76 m 2 Preferred Pharmacy Pharmacy Name Phone [facility] Johanny Trotter [phone] Your Updated Medication List  
  
   
This list is accurate as of 6/8/18 11:19 AM.  Always use your most recent med list.  
  
  
  
  
 ABILIFY 5 mg tablet Generic drug:  ARIPiprazole Take 5 mg by mouth daily. Indications: anxiety and depression buPROPion  mg SR tablet Commonly known as:  Urbano Perdue Take 300 mg by mouth daily. 300 XR  Indications: ANXIETY WITH DEPRESSION  
  
 FORTESTA TD  
4 Pump(s) by TransDERmal route daily. Indications: Low T  
  
 multivitamin with iron chewable tablet Commonly known as:  Quita Spillers Take 1 Tab by mouth daily. Omeprazole delayed release 20 mg tablet Commonly known as:  PRILOSEC D/R Take 1 Tab by mouth daily. OTC  
  
 temazepam 30 mg capsule Commonly known as:  RESTORIL Take 30 mg by mouth nightly as needed for Sleep. TIKOSYN PO Take 0.5 mg by mouth two (2) times a day. A-Fib ZyrTEC 10 mg tablet Generic drug:  cetirizine Take 10 mg by mouth daily. Indications: Allergic Rhinitis Follow-up Instructions Return in about 2 months (around 8/8/2018). Patient Instructions Body Mass Index: Care Instructions Your Care Instructions Body mass index (BMI) can help you see if your weight is raising your risk for health problems. It uses a formula to compare how much you weigh with how tall you are. · A BMI lower than 18.5 is considered underweight. · A BMI between 18.5 and 24.9 is considered healthy. · A BMI between 25 and 29.9 is considered overweight. A BMI of 30 or higher is considered obese. If your BMI is in the normal range, it means that you have a lower risk for weight-related health problems.  If your BMI is in the overweight or obese range, you may be at increased risk for weight-related health problems, such as high blood pressure, heart disease, stroke, arthritis or joint pain, and diabetes. If your BMI is in the underweight range, you may be at increased risk for health problems such as fatigue, lower protection (immunity) against illness, muscle loss, bone loss, hair loss, and hormone problems. BMI is just one measure of your risk for weight-related health problems. You may be at higher risk for health problems if you are not active, you eat an unhealthy diet, or you drink too much alcohol or use tobacco products. Follow-up care is a key part of your treatment and safety. Be sure to make and go to all appointments, and call your doctor if you are having problems. It's also a good idea to know your test results and keep a list of the medicines you take. How can you care for yourself at home? · Practice healthy eating habits. This includes eating plenty of fruits, vegetables, whole grains, lean protein, and low-fat dairy. · If your doctor recommends it, get more exercise. Walking is a good choice. Bit by bit, increase the amount you walk every day. Try for at least 30 minutes on most days of the week. · Do not smoke. Smoking can increase your risk for health problems. If you need help quitting, talk to your doctor about stop-smoking programs and medicines. These can increase your chances of quitting for good. · Limit alcohol to 2 drinks a day for men and 1 drink a day for women. Too much alcohol can cause health problems. If you have a BMI higher than 25 · Your doctor may do other tests to check your risk for weight-related health problems. This may include measuring the distance around your waist. A waist measurement of more than 40 inches in men or 35 inches in women can increase the risk of weight-related health problems. · Talk with your doctor about steps you can take to stay healthy or improve your health. You may need to make lifestyle changes to lose weight and stay healthy, such as changing your diet and getting regular exercise. If you have a BMI lower than 18.5 · Your doctor may do other tests to check your risk for health problems. · Talk with your doctor about steps you can take to stay healthy or improve your health. You may need to make lifestyle changes to gain or maintain weight and stay healthy, such as getting more healthy foods in your diet and doing exercises to build muscle. Where can you learn more? Go to http://royer-neil.info/. Enter S176 in the search box to learn more about \"Body Mass Index: Care Instructions. \" Current as of: October 13, 2016 Content Version: 11.4 © 6838-2596 [employer]. Care instructions adapted under license by [employer] (which disclaims liability or warranty for this information). If you have questions about a medical condition or this instruction, always ask your healthcare professional. [address] any warranty or liability for your use of this information. Patient Instructions 1. Continue to monitor carbohydrate and protein intake- remember to keep your           total  carbohydrates to 50 grams or less per day for best results. 2. Remember hydration goals - usually 48 to 64 ounces of liquids per day 3. Continue to work towards exercise goals - minimum 3 days per week of 45          minutes to  1 hour at a time. 4. Remember to take vitamins as directed Supplement Resource Guide Importance of Protein:  
Maintains lean body mass, produces antibodies to fight off infections, heals wounds, minimizes hair loss, helps to give you energy, helps with satiety, and keeping you full between meals. Importance of Calcium: 
Needed for healthy bones and teeth, normal blood clotting, and nervous system functioning, higher risk of osteoporosis and bone disease with non-compliance. Importance of Multivitamins: Many functions. Supply you with extra nutrients that you may be missing from food. May lead to iron deficiency anemia, weakness, fatigue, and many other symptoms with non-compliance. Importance of B Vitamins: 
Important for red blood cell formation, metabolism, energy, and helps to maintain a healthy nervous system. Protein Supplement Find one you like now. Use immediately after surgery. Look for: 
35-50g protein each day from your protein supplement once you reach the progression diet. 0-3 g fat per serving 0-3 g sugar per serving Protein drinks should be split in separate dosages. Recommend: Lifelong 1 year + Calcium Supplement:  
 
Start taking within a month after surgery. Look for: Calcium Citrate Plus D (1500 mg per day) Recommend: Citracal 
 
 . Avoid chocolate chewable calcium. Can use chewable bariatric or [employer] brand or similar chewable. The body cannot absorb more than 500-600 mg @ a time. Take for Life Multi-vitamin Supplement:   
 
1st Month After Surgery: Any complete chewable, such as: Madisons Complete chewables. Avoid Madison sours or gummies. They lack iron and other important nutrients and also have added sugar. Continue with chewable vitamin or change to adult complete multivitamin one month after surgery. Menstruating women can take a prenatal vitamin. Make sure has at least 18 mg iron and 659-033 mcg folic acid): Vitamin B12, B Complex Vitamin, and Biotin Start taking within a month after surgery. Vitamin B12:  1000 mcg of Vitamin B12 three times weekly Must take sublingually (meaning you take it under your tongue) or in a liquid drop form for easy absorption. B Complex Vitamin: Take a pill or liquid drop form once daily. Biotin: This vitamin can help prevent hair loss. Recommend 5mg  
(5000 mcg) a day Biotin is Optional  
 
 
 
 
 
 Patient Instructions History Introducing [facility] SERVICES! Isiah Montano introduces 9facts patient portal. Now you can access parts of your medical record, email your doctor's office, and request medication refills online. 1. In your internet browser, go to [email or web address] 2. Click on the First Time User? Click Here link in the Sign In box. You will see the New Member Sign Up page. 3. Enter your 9facts Access Code exactly as it appears below. You will not need to use this code after youve completed the sign-up process. If you do not sign up before the expiration date, you must request a new code. · 9facts Access Code: [email or web address] Expires: 7/3/2018 11:06 AM 
 
4. Enter the last four digits of your Social Security Number (xxxx) and Date of Birth (mm/dd/yyyy) as indicated and click Submit. You will be taken to the next sign-up page. 5. Create a 9facts ID. This will be your 9facts login ID and cannot be changed, so think of one that is secure and easy to remember. 6. Create a 9facts password. You can change your password at any time. 7. Enter your Password Reset Question and Answer. This can be used at a later time if you forget your password. 8. Enter your e-mail address. You will receive e-mail notification when new information is available in [address] 9. Click Sign Up. You can now view and download portions of your medical record. 10. Click the Download Summary menu link to download a portable copy of your medical information. If you have questions, please visit the Frequently Asked Questions section of the 9facts website. Remember, 9facts is NOT to be used for urgent needs. For medical emergencies, dial 911. Now available from your iPhone and Android! Please provide this summary of care documentation to your next provider. Your primary care clinician is listed as Mar Garcias. If you have any questions after today's visit, please call [phone].

## 2018-06-25 ENCOUNTER — OFFICE VISIT (OUTPATIENT)
Dept: HEMATOLOGY | Age: 58
End: 2018-06-25

## 2018-06-25 ENCOUNTER — HOSPITAL ENCOUNTER (OUTPATIENT)
Dept: LAB | Age: 58
Discharge: HOME | End: 2018-06-25
Payer: OTHER GOVERNMENT

## 2018-06-25 VITALS
OXYGEN SATURATION: 96 % | HEART RATE: 57 BPM | WEIGHT: 309 LBS | SYSTOLIC BLOOD PRESSURE: 132 MMHG | RESPIRATION RATE: 20 BRPM | BODY MASS INDEX: 36.48 KG/M2 | HEIGHT: 77 IN | DIASTOLIC BLOOD PRESSURE: 70 MMHG | TEMPERATURE: 97.6 F

## 2018-06-25 DIAGNOSIS — Z98.84 H/O BARIATRIC SURGERY: ICD-10-CM

## 2018-06-25 DIAGNOSIS — K76.0 NAFLD (NONALCOHOLIC FATTY LIVER DISEASE): ICD-10-CM

## 2018-06-25 DIAGNOSIS — K75.81 NASH (NONALCOHOLIC STEATOHEPATITIS): ICD-10-CM

## 2018-06-25 DIAGNOSIS — K75.81 NASH (NONALCOHOLIC STEATOHEPATITIS): Primary | ICD-10-CM

## 2018-06-25 DIAGNOSIS — Z98.84 S/P LAPAROSCOPIC SLEEVE GASTRECTOMY: ICD-10-CM

## 2018-06-25 LAB
ALBUMIN SERPL-MCNC: 4.3 G/DL (ref 3.4–5)
ALBUMIN/GLOB SERPL: 1.3 {RATIO} (ref 0.8–1.7)
ALP SERPL-CCNC: 76 U/L (ref 45–117)
ALT SERPL-CCNC: 46 U/L (ref 16–61)
AST SERPL-CCNC: 27 U/L (ref 15–37)
BILIRUB DIRECT SERPL-MCNC: 0.4 MG/DL (ref 0–0.2)
BILIRUB SERPL-MCNC: 2.4 MG/DL (ref 0.2–1)
GLOBULIN SER CALC-MCNC: 3.2 G/DL (ref 2–4)
PROT SERPL-MCNC: 7.5 G/DL (ref 6.4–8.2)

## 2018-06-25 PROCEDURE — 80076 HEPATIC FUNCTION PANEL: CPT | Performed by: INTERNAL MEDICINE

## 2018-06-25 PROCEDURE — 36415 COLL VENOUS BLD VENIPUNCTURE: CPT | Performed by: INTERNAL MEDICINE

## 2018-06-25 NOTE — MR AVS SNAPSHOT
303 [facility] 
 
 
 [address], [address] [place] [address] 
[phone] Patient: Miguelangel Vigil MRN: [id number] WEF:[birth year] Visit Information Date & Time Provider Department Dept. Phone Encounter #  
 6/25/2018 11:00 AM MD Andrews Costa [address] [id number] Follow-up Instructions Return in about 1 year (around 6/25/2019) for NP. Your Appointments 8/9/2018 10:00 AM  
Office Visit with NORMAN Finch [facility] [place] ([facility]) Appt Note: 4 mo  
 [address] [facility] 305 [facility]  
  
   
 [address] [place] [address] Upcoming Health Maintenance Date Due DTaP/Tdap/Td series (1 - Tdap) 2/21/1983 FOBT Q 1 YEAR AGE 50-75 2/21/2012 Influenza Age 5 to Adult 8/1/2018 Allergies as of 6/25/2018  Review Complete On: 6/25/2018 By: Palak Cavazos No Known Allergies Current Immunizations  Reviewed on 4/5/2018 No immunizations on file. Not reviewed this visit You Were Diagnosed With   
  
 Codes Comments SHAW (nonalcoholic steatohepatitis)    -  Primary ICD-10-CM: K88.91 ICD-9-CM: 571.8 H/O bariatric surgery     ICD-10-CM: Z98.84 ICD-9-CM: V45.86 Vitals BP Pulse Temp Resp Height(growth percentile) 132/70 (BP 1 Location: Right arm, BP Patient Position: Sitting) (!) 57 97.6 °F (36.4 °C) (Tympanic) 20 6' 5\" (1.956 m) Weight(growth percentile) SpO2 BMI Smoking Status 309 lb (140.2 kg) 96% 36.64 kg/m2 Never Smoker BMI and BSA Data Body Mass Index Body Surface Area  
 36.64 kg/m 2 2.76 m 2 Preferred Pharmacy Pharmacy Name Phone [facility] Johanny Bain Caro [phone] Your Updated Medication List  
  
   
 This list is accurate as of 6/25/18 11:01 AM.  Always use your most recent med list.  
  
  
  
  
 ABILIFY 5 mg tablet Generic drug:  ARIPiprazole Take 5 mg by mouth daily. Indications: anxiety and depression buPROPion  mg SR tablet Commonly known as:  Yue Noble Take 300 mg by mouth daily. 300 XR  Indications: ANXIETY WITH DEPRESSION  
  
 FORTESTA TD  
4 Pump(s) by TransDERmal route daily. Indications: Low T  
  
 multivitamin with iron chewable tablet Commonly known as:  Leeanna Code Take 1 Tab by mouth daily. Omeprazole delayed release 20 mg tablet Commonly known as:  PRILOSEC D/R Take 1 Tab by mouth daily. OTC  
  
 temazepam 30 mg capsule Commonly known as:  RESTORIL Take 30 mg by mouth nightly as needed for Sleep. TIKOSYN PO Take 0.5 mg by mouth two (2) times a day. A-Fib ZyrTEC 10 mg tablet Generic drug:  cetirizine Take 10 mg by mouth daily. Indications: Allergic Rhinitis Follow-up Instructions Return in about 1 year (around 6/25/2019) for NP. To-Do List   
 06/25/2018 Lab:  HEPATIC FUNCTION PANEL   
  
 06/25/2018 Imaging:  US ABD LTD W ELASTOGRAPHY Introducing \[facility]\"" & HEALTH SERVICES! [employer] introduces SeoPult patient portal. Now you can access parts of your medical record, email your doctor's office, and request medication refills online. 1. In your internet browser, go to [email or web address] 2. Click on the First Time User? Click Here link in the Sign In box. You will see the New Member Sign Up page. 3. Enter your SeoPult Access Code exactly as it appears below. You will not need to use this code after youve completed the sign-up process. If you do not sign up before the expiration date, you must request a new code. · SeoPult Access Code: [email or web address] Expires: 7/3/2018 11:06 AM 
 
4.  Enter the last four digits of your Social Security Number (xxxx) and Date of Birth (mm/dd/yyyy) as indicated and click Submit. You will be taken to the next sign-up page. 5. Create a Sponsia ID. This will be your Sponsia login ID and cannot be changed, so think of one that is secure and easy to remember. 6. Create a Sponsia password. You can change your password at any time. 7. Enter your Password Reset Question and Answer. This can be used at a later time if you forget your password. 8. Enter your e-mail address. You will receive e-mail notification when new information is available in [address] 9. Click Sign Up. You can now view and download portions of your medical record. 10. Click the Download Summary menu link to download a portable copy of your medical information. If you have questions, please visit the Frequently Asked Questions section of the Sponsia website. Remember, Sponsia is NOT to be used for urgent needs. For medical emergencies, dial 911. Now available from your iPhone and Android! Please provide this summary of care documentation to your next provider. Your primary care clinician is listed as Geraldo Nuñez. If you have any questions after today's visit, please call [phone].

## 2018-06-25 NOTE — PROGRESS NOTES
Osman Peraza is a 66 y.o. male      1. Have you been to the ER, urgent care clinic or hospitalized since your last visit? YES    Patient had gastric sleeve done at THE [facility].    2. Have you seen or consulted any other health care providers outside of the [address] [place] [address] since your last visit (Include any pap smears or colon screening)? YES    Patient has been to PCP since last visit.          Learning Assessment 12/5/2013   PRIMARY LEARNER Patient   PRIMARY LANGUAGE ENGLISH   LEARNER PREFERENCE PRIMARY LISTENING   ANSWERED BY self   RELATIONSHIP SELF

## 2018-06-25 NOTE — PROGRESS NOTES
[address] Galindo Albarado MD, KIT [facility], [address] [place], [place]       Akira Flores, NP    VIOLA Carrero, [facility]   [place] Abel, KAMI Johnson [facility] [address]    at [address], [address] [address]  22.    [phone]    FAX: [address] [place] [address], [address], [address]    [phone]    FAX: [phone]       Patient Care Team:  Sven Mazariegos MD as PCP - General (Internal Medicine)  Isi Guillen MD (General Surgery)      Problem List  Date Reviewed: 7/9/2018          Codes Class Noted    Severe obesity (BMI 35.0-39. 9) with comorbidity [facility]) ICD-10-CM: E66.01  ICD-9-CM: 278.01  5/3/2018        Intestinal malabsorption ICD-10-CM: K90.9  ICD-9-CM: 579.9  5/3/2018        S/P laparoscopic sleeve gastrectomy ICD-10-CM: Z98.84  ICD-9-CM: V45.86  8/10/2016    Overview Signed 7/9/2018  2:13 PM by Kym Pettit MD     4/2018             H/O lumbosacral spine surgery ICD-10-CM: Z98.890  ICD-9-CM: V15.29  8/10/2016        S/P appendectomy ICD-10-CM: Z90.49  ICD-9-CM: V45.89  8/10/2016        NAFLD (nonalcoholic fatty liver disease) ICD-10-CM: K76.0  ICD-9-CM: 571.8  11/7/2013        History of atrial fibrillation ICD-10-CM: Z86.79  ICD-9-CM: V12.59  Unknown    Overview Signed 8/5/2013  9:52 AM by NORMAN Brenner     cardioverted / no recurrance             Depression ICD-10-CM: F32.9  ICD-9-CM: 311  Unknown        Atrial fibrillation ([address] ICD-10-CM: I48.91  ICD-9-CM: 427.31  11/19/2012    Overview Signed 8/10/2016 12:06 PM by Kym Pettit MD     [facility] [address] with medication             High triglycerides ICD-10-CM: E78.1  ICD-9-CM: 272.1  8/1/2012        Sleep apnea ICD-10-CM: G47.30  ICD-9-CM: 780.57  8/1/2012    Overview Signed 8/1/2012 11:21 AM by Melia Caruso     c-pap machine               Chronic back pain ICD-10-CM: M54.9, G89.29  ICD-9-CM: 724.5, 338.29  8/1/2012        Hypertension ICD-10-CM: I10  ICD-9-CM: 401.9  Unknown              [facility] for management of non-alcoholic fatty liver disease (NAFLD). The active problem list, all pertinent past medical history, medications,   liver histology, radiologic findings and laboratory findings related to the liver disorder were reviewed with the patient. The patient is a 66 y.o.  male who was found to have fatty liver disease on liver biopsy at the time of gastric banding in 10/2013. The baseline weight was 360 pounds. This declined to 320 pounds but then returned to the baseline weight. Since the last appointment he has undergone sleeve gastrectomy    He has lost 50 pounds since 4/2018. The patient has no symptoms which could be attributed to the liver disorder. The patient has not experienced fatigue, pain in the right side over the liver, problems concentrating, swelling of the abdomen, swelling of the lower extremities, hematemesis, hematochezia. The patient completes all daily activities without any functional limitations. All of the issues listed in the Assessment and Plan were discussed with the patient. All questions were answered. The patient expressed a clear understanding of the above. [address] [place] [address] in 12 weeks for elastography and assess liver response to weight loss. ASSESSMENT AND PLAN:  NAFLD  The patient has had 2 liver biopsies. The first at the time at the time of gastric banding in 2013. This demonstrated SHAW with stage 3 bridging fibrosis. Th second at the time of sleeve gastrectomy in 4/2018. This demonstrated NAFLD with stage 2 fibrosis.   He lost about 40 pounds or 12% of body weight but then regained back to baseline weight between 2013 and 2018. The improvement in the liver biopsy from 2013 to 2018 may reflect the weight loss he had with the gastric band. The patient has had surgical therapy for treatment of obesity. The patient has already lost 50 pounds in the past 2 months since undergoing gastric sleeve. All features of metabolic syndrome including NAFLD should improve with weight loss. It was stressed to the patient that he should remain on his post-surgical diet. Serologic evaluation for other causes of chronic liver disease was negative. Liver transaminases are elevated. Alkaline phosphate is normal.  Liver function is normal.  The platelet count is normal.      The need to assess and monitor liver fibrosis was discussed. Sheer wave elastography can assess liver fibrosis and determine if a patient has advanced fibrosis or cirrhosis without the need for liver biopsy. Sheer wave elastography is now available at [facility] [facility]. The first elastography prior to undergoing the gastric sleeve demonstrated a liver stiffness of 8.8 kPa consistent with stage 2-3 fibrosis. Elastography will be repeated annually to assess for fibrosis regression with ongoing weight loss     [place] Syndrome  There is a mild elevation in total bilirubin that is mostly indirect fraction consistent with [place] disease. 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 Treatment of other medical problems in patients with chronic liver disease  There are no contraindications for the patient to take any medications that are necessary for treatment of other medical issues. Counseling for alcohol in patients with chronic liver disease  The patient was counseled regarding alcohol consumption and the effect of alcohol on chronic liver disease. The patient was reminded that alcohol can cause fatty liver.   Patients who have undergone obesity surgery are at much greater risk to develop alcoholic liver injury. Vaccinations   Vaccination for viral hepatitis A is recommended since the patient has no serologic evidence of previous exposure or vaccination with immunity. Vaccination for viral hepatitis B is not needed. The patient has serologic evidence of prior exposure or vaccination with immunity. Routine vaccinations against other bacterial and viral agents can be performed as indicated. Annual flu vaccination should be administered if indicated. ALLERGIES  No Known Allergies    MEDICATIONS  Current Outpatient Prescriptions   Medication Sig    multivitamin with iron (FLINTSTONES) chewable tablet Take 1 Tab by mouth daily.  ARIPiprazole (ABILIFY) 5 mg tablet Take 5 mg by mouth daily. Indications: anxiety and depression    temazepam (RESTORIL) 30 mg capsule Take 30 mg by mouth nightly as needed for Sleep.  TESTOSTERONE (FORTESTA TD) 4 Pump(s) by TransDERmal route daily. Indications: Low T    DOFETILIDE (TIKOSYN PO) Take 0.5 mg by mouth two (2) times a day. A-Fib    cetirizine (ZYRTEC) 10 mg tablet Take 10 mg by mouth daily. Indications: Allergic Rhinitis    buPROPion SR (WELLBUTRIN SR) 150 mg SR tablet Take 300 mg by mouth daily. 300 XR  Indications: ANXIETY WITH DEPRESSION    Omeprazole delayed release (PRILOSEC D/R) 20 mg tablet Take 1 Tab by mouth daily. OTC     No current facility-administered medications for this visit. SYSTEM REVIEW NOT RELATED TO LIVER DISEASE OR REVIEWED ABOVE:  Constitution systems: Negative for fever, chills, weight gain, weight loss. Eyes: Negative for visual changes. ENT: Negative for sore throat, painful swallowing. Respiratory: Negative for cough, hemoptysis, SOB. Cardiology: Negative for chest pain, palpitations. GI:  Negative for constipation or diarrhea. : Negative for urinary frequency, dysuria, hematuria, nocturia. Skin: Negative for rash.   Hematology: Negative for easy bruising, blood clots.    Musculo-skelatal: Negative for back pain, muscle pain, weakness. Neurologic: Negative for headaches, dizziness, vertigo, memory problems not related to HE. Psychology: Negative for anxiety, depression. FAMILY HISTORY:  The father  of MI. The mother is alive and healthy. There is no family history of liver disease. SOCIAL HISTORY:  The patient is . The patient has 3 children,   The patient has never used tobacco products. The patient had consumed 5-6 alcoholic beverages per day. Sicne my initial appointment with me in 2016 he now only consumes 0-2 alcoholic beverages per day. The patient used to work as a . The patient retired in . PHYSICAL EXAMINATION:  Visit Vitals    /70 (BP 1 Location: Right arm, BP Patient Position: Sitting)    Pulse (!) 57    Temp 97.6 °F (36.4 °C) (Tympanic)    Resp 20    Ht 6' 5\" (1.956 m)    Wt 309 lb (140.2 kg)    SpO2 96%    BMI 36.64 kg/m2     General: No acute distress. Eyes: Sclera anicteric. ENT: No oral lesions. Thyroid normal.  Nodes: No adenopathy. Skin: No spider angiomata. No jaundice. No palmar erythema. Respiratory: Lungs clear to auscultation. Cardiovascular: Regular heart rate. No murmurs. No JVD. Abdomen: Soft non-tender. Liver size normal to percussion/palpation. Spleen not palpable. No obvious ascites. Extremities: No edema. No muscle wasting. No gross arthritic changes. Neurologic: Alert and oriented. Cranial nerves grossly intact. No asterixis.     LABORATORY STUDIES:  Liver [place] of [address] & Units 2018 3/26/2018   WBC 4.6 - 13.2 K/uL  6.3   ANC 1.8 - 8.0 K/UL  3.1   HGB 13.0 - 16.0 g/dL  17.5 (H)    - 420 K/uL  190   INR 0.8 - 1.2       AST 15 - 37 U/L 27 34   ALT 16 - 61 U/L 46 84 (H)   Alk Phos 45 - 117 U/L 76 62   Bili, Total 0.2 - 1.0 MG/DL 2.4 (H) 1.8 (H)   Bili, Direct 0.0 - 0.2 MG/DL 0.4 (H)    Albumin 3.4 - 5.0 g/dL 4.3 4.1 BUN 7.0 - 18 MG/DL  12   Creat 0.6 - 1.3 MG/DL  0.90   Na 136 - 145 mmol/L  138   K 3.5 - 5.5 mmol/L  3.9   Cl 100 - 108 mmol/L  107   CO2 21 - 32 mmol/L  26   Glucose 74 - 99 mg/dL  74     SEROLOGIES:  Serologies Latest Ref Rng 8/10/2016   Hep A Ab, Total NEGATIVE   NEGATIVE   Hep B Surface Ag <1.00 Index <0.10   Hep B Surface Ag Interp NEG   NEGATIVE   Hep B Core Ab, Total NEGATIVE   NEGATIVE   Hep B Surface Ab >10.0 mIU/mL 223.44   Hep B Surface Ab Interp POS   POSITIVE   Hep C Ab 0.0 - 0.9 s/co ratio <0.1   Ferritin 8 - 388 NG/ML 17   Iron % Saturation % 8   C-ANCA Neg:<1:20 titer <1:20   P-ANCA Neg:<1:20 titer <1:20   ANCA Neg:<1:20 titer <1:20   ASMCA 0 - 19 Units 15   M2 Ab 0.0 - 20.0 Units 9.0   Alpha-1 antitrypsin level 90 - 200 mg/dL 140     LIVER HISTOLOGY:  10/2013. Slides reviewed by MLS. SHAW. 40% macro and micovesicular steatosis. Mild ballooning. Moderate inflammation. Bridging fibrosis. HARVINDER (221)  12/2017. Sheer wave elastography performed at [facility]. E Range: 7.31-11.27 kPa, E Mean: 8.93 kPa, E Median: 8.71 kPa, E Std: 1.5 to kPa. The results suggested a fibrosis level of F2.  4/2018. Slides reviewed by MLS. NAFLD. 20% macro and micovesicular steatosis. Mild ballooning. Mild inflammation. Stage 2 septal fibrosis. HARVINDER (111). ENDOSCOPIC PROCEDURES:  Not available or performed    RADIOLOGY:  12/2017. Ultrasound of liver. Echogenic consistent with fatty liver. Liver mass lesion 1.5 cm which may be focal fatty sparing. No dilated bile ducts. No ascites. 1/2017. Dynamic MRI of th liver. Changes consistent with fatty liver. Liver mass consistent with focal fatty sparing. Normal spleen. No ascites.     OTHER TESTING:  Not available or performed      MD Andrews Etienne 13 of [address] [place] [address], [address] [place] [address]  [address] Jenny Rosario, [address]  [phone]

## 2018-06-27 ENCOUNTER — TELEPHONE (OUTPATIENT)
Dept: HEMATOLOGY | Age: 58
End: 2018-06-27

## 2018-07-09 PROBLEM — Z98.84 H/O BARIATRIC SURGERY: Status: RESOLVED | Noted: 2017-10-12 | Resolved: 2018-07-09

## 2018-07-20 ENCOUNTER — HOSPITAL ENCOUNTER (OUTPATIENT)
Dept: ULTRASOUND IMAGING | Age: 58
Discharge: HOME | End: 2018-07-20
Attending: INTERNAL MEDICINE
Payer: OTHER GOVERNMENT

## 2018-07-20 DIAGNOSIS — K75.81 NASH (NONALCOHOLIC STEATOHEPATITIS): ICD-10-CM

## 2018-07-20 PROCEDURE — 0346T US ABD LTD W ELASTOGRAPHY: CPT

## 2018-08-09 ENCOUNTER — HOSPITAL ENCOUNTER (OUTPATIENT)
Dept: LAB | Age: 58
Discharge: HOME | End: 2018-08-09
Payer: OTHER GOVERNMENT

## 2018-08-09 ENCOUNTER — OFFICE VISIT (OUTPATIENT)
Dept: SURGERY | Age: 58
End: 2018-08-09

## 2018-08-09 VITALS
OXYGEN SATURATION: 99 % | WEIGHT: 306.3 LBS | TEMPERATURE: 97.1 F | BODY MASS INDEX: 36.17 KG/M2 | HEART RATE: 67 BPM | HEIGHT: 77 IN | RESPIRATION RATE: 16 BRPM | SYSTOLIC BLOOD PRESSURE: 129 MMHG | DIASTOLIC BLOOD PRESSURE: 60 MMHG

## 2018-08-09 DIAGNOSIS — K90.9 INTESTINAL MALABSORPTION, UNSPECIFIED TYPE: Primary | ICD-10-CM

## 2018-08-09 DIAGNOSIS — Z98.84 S/P LAPAROSCOPIC SLEEVE GASTRECTOMY: ICD-10-CM

## 2018-08-09 LAB
25(OH)D3 SERPL-MCNC: 37.7 NG/ML (ref 30–100)
ALBUMIN SERPL-MCNC: 4.2 G/DL (ref 3.4–5)
ALBUMIN/GLOB SERPL: 1.2 {RATIO} (ref 0.8–1.7)
ALP SERPL-CCNC: 70 U/L (ref 45–117)
ALT SERPL-CCNC: 30 U/L (ref 16–61)
ANION GAP SERPL CALC-SCNC: 9 MMOL/L (ref 3–18)
AST SERPL-CCNC: 20 U/L (ref 15–37)
BASOPHILS # BLD: 0 K/UL (ref 0–0.1)
BASOPHILS NFR BLD: 1 % (ref 0–2)
BILIRUB SERPL-MCNC: 2.2 MG/DL (ref 0.2–1)
BUN SERPL-MCNC: 16 MG/DL (ref 7–18)
BUN/CREAT SERPL: 15 (ref 12–20)
CALCIUM SERPL-MCNC: 9.1 MG/DL (ref 8.5–10.1)
CHLORIDE SERPL-SCNC: 108 MMOL/L (ref 100–108)
CO2 SERPL-SCNC: 25 MMOL/L (ref 21–32)
CREAT SERPL-MCNC: 1.04 MG/DL (ref 0.6–1.3)
DIFFERENTIAL METHOD BLD: NORMAL
EOSINOPHIL # BLD: 0.2 K/UL (ref 0–0.4)
EOSINOPHIL NFR BLD: 3 % (ref 0–5)
ERYTHROCYTE [DISTWIDTH] IN BLOOD BY AUTOMATED COUNT: 13.3 % (ref 11.6–14.5)
FERRITIN SERPL-MCNC: 120 NG/ML (ref 8–388)
FOLATE SERPL-MCNC: >20 NG/ML (ref 3.1–17.5)
GLOBULIN SER CALC-MCNC: 3.4 G/DL (ref 2–4)
GLUCOSE SERPL-MCNC: 107 MG/DL (ref 74–99)
HCT VFR BLD AUTO: 45.5 % (ref 36–48)
HGB BLD-MCNC: 15.6 G/DL (ref 13–16)
IRON SERPL-MCNC: 148 UG/DL (ref 50–175)
LYMPHOCYTES # BLD: 1.7 K/UL (ref 0.9–3.6)
LYMPHOCYTES NFR BLD: 33 % (ref 21–52)
MCH RBC QN AUTO: 30.3 PG (ref 24–34)
MCHC RBC AUTO-ENTMCNC: 34.3 G/DL (ref 31–37)
MCV RBC AUTO: 88.3 FL (ref 74–97)
MONOCYTES # BLD: 0.5 K/UL (ref 0.05–1.2)
MONOCYTES NFR BLD: 9 % (ref 3–10)
NEUTS SEG # BLD: 2.8 K/UL (ref 1.8–8)
NEUTS SEG NFR BLD: 54 % (ref 40–73)
PLATELET # BLD AUTO: 152 K/UL (ref 135–420)
PMV BLD AUTO: 10.2 FL (ref 9.2–11.8)
POTASSIUM SERPL-SCNC: 3.8 MMOL/L (ref 3.5–5.5)
PROT SERPL-MCNC: 7.6 G/DL (ref 6.4–8.2)
RBC # BLD AUTO: 5.15 M/UL (ref 4.7–5.5)
SODIUM SERPL-SCNC: 142 MMOL/L (ref 136–145)
VIT B12 SERPL-MCNC: 506 PG/ML (ref 211–911)
WBC # BLD AUTO: 5.1 K/UL (ref 4.6–13.2)

## 2018-08-09 PROCEDURE — 83540 ASSAY OF IRON: CPT | Performed by: PHYSICIAN ASSISTANT

## 2018-08-09 PROCEDURE — 82728 ASSAY OF FERRITIN: CPT | Performed by: PHYSICIAN ASSISTANT

## 2018-08-09 PROCEDURE — 82306 VITAMIN D 25 HYDROXY: CPT | Performed by: PHYSICIAN ASSISTANT

## 2018-08-09 PROCEDURE — 80053 COMPREHEN METABOLIC PANEL: CPT | Performed by: PHYSICIAN ASSISTANT

## 2018-08-09 PROCEDURE — 82607 VITAMIN B-12: CPT | Performed by: PHYSICIAN ASSISTANT

## 2018-08-09 PROCEDURE — 85025 COMPLETE CBC W/AUTO DIFF WBC: CPT | Performed by: PHYSICIAN ASSISTANT

## 2018-08-09 PROCEDURE — 84425 ASSAY OF VITAMIN B-1: CPT | Performed by: PHYSICIAN ASSISTANT

## 2018-08-09 PROCEDURE — 36415 COLL VENOUS BLD VENIPUNCTURE: CPT | Performed by: PHYSICIAN ASSISTANT

## 2018-08-09 RX ORDER — MAGNESIUM 200 MG
1000 TABLET ORAL DAILY
COMMUNITY
End: 2019-06-25

## 2018-08-09 RX ORDER — IBUPROFEN 200 MG
CAPSULE ORAL DAILY
COMMUNITY

## 2018-08-09 RX ORDER — GLUCOSAMINE SULFATE 1500 MG
POWDER IN PACKET (EA) ORAL DAILY
COMMUNITY

## 2018-08-09 NOTE — PATIENT INSTRUCTIONS
Patient Instructions      1. Remember hydration goals - minimum of 64 ounces of liquids per day (dehydration is the number one reason for hospital readmission). 2. Continue to monitor carbohydrate and protein intake you need a minimum of  Grams of protein daily- remember to keep your total carbohydrates to 50 grams or less per day for best results. 3. Continue to work towards exercise goals - 60-90 minutes, 5 times a week minimum of deliberate, aerobic exercise is the ultimate goal with strength training 2 times each week. Refer to uTaP for  information. 4. Remember to take vitamins as directed in your handbook. 5. Attend support group the 2nd Thursday of each month. 6. Constipation: Milk of Magnesia is for immediate relief. Miralax is to be used every day if constipation is a chronic problem. 7. Diarrhea: patients will occasionally develop lactose intolerance after surgery. Check to see if your protein shake has whey in it. If it does try one that does not have whey and stop all yogurts, cheeses and milks to see if the diarrhea goes away. 8. Call us at [phone] or email us through [facility] with questions,     concerns or worsening of condition, we have someone on call 24 hours a day. If you are unable to reach our office, you are to go to your Primary Care Physician or the Emergency Department. Supplement Resource Guide    Importance of Protein:   Maintains lean body mass, produces antibodies to fight off infections, heals wounds, minimizes hair loss, helps to give you energy, helps with satiety, and keeping you full between meals. Importance of Calcium:  Needed for healthy bones and teeth, normal blood clotting, and nervous system functioning, higher risk of osteoporosis and bone disease with non-compliance. Importance of Multivitamins: Many functions.   Supply you with extra nutrients that you may be missing from food. May lead to iron deficiency anemia, weakness, fatigue, and many other symptoms with non-compliance. Importance of B Vitamins:  Important for red blood cell formation, metabolism, energy, and helps to maintain a healthy nervous system. Protein Supplement  Find one you like now. Use immediately after surgery. Look for:  35-50g protein each day from your protein supplement once you reach the progression diet. 0-3 g fat per serving  0-3 g sugar per serving    Protein drinks should be split in separate dosages. Recommend: Lifelong  1 year + Calcium Supplement:     Start taking within a month after surgery. Look for: Calcium Citrate Plus D (1500 mg per day)  Recommend: Citracal     .            Avoid chocolate chewable calcium. Can use chewable bariatric or [employer] brand or similar chewable. The body cannot absorb more than 500-600 mg of calcium at a time. Take for Life Multi-vitamin Supplement:      Start immediately after surgery: any complete chewable, such as: [place]s Complete chewables. Avoid [place] sours or gummies. They lack iron and other important nutrients and also have added sugar. Continue with chewable vitamin or change to adult complete multivitamin one month after surgery. Menstruating women can take a prenatal vitamin. Make sure has at least 18 mg iron and 501-816 mcg folic acid   Vitamin M00, B Complex Vitamin, and Biotin  Start taking within a month after surgery. Vitamin B12:  1000 mcg of Vitamin B12 three times weekly    Must take sublingually (meaning you take it under your tongue) or in a liquid drop form for easy absorption. B Complex Vitamin: Take a pill or liquid drop form once daily. Biotin: This vitamin can help prevent hair loss.     Recommend 5mg   (5000 mcg) a day  Biotin is Optional

## 2018-08-09 NOTE — MR AVS SNAPSHOT
[address] [facility], [place], operated by [facility] 
 
 
 [address] [facility] 
[phone] Patient: Elysia Nunes MRN: [id number] Visit Information Date & Time Provider Department Dept. Phone Encounter #  
 8/9/2018 10:00 AM Shayla Stout, 82 [facility] [phone] [id number] Follow-up Instructions Return in about 2 months (around 10/9/2018). Your Appointments 10/12/2018 10:00 AM  
Office Visit with NORMAN Gerardo [facility] ([facility]) Appt Note: 6 mo  
 [address] [place] [address] [phone] [facility] [address]  
[phone]  
  
   
 [address]  [address] Margarita [address]ton  
  
    
 6/25/2019 11:00 AM  
[place] [address] [facility] ([facility]) Appt Note: 1yr f/u  
 [address] [place] [address], [address] [facility] [address]  
  
   
 [address], [facility] Upcoming Health Maintenance Date Due DTaP/Tdap/Td series (1 - Tdap) 2/21/1983 FOBT Q 1 YEAR AGE 50-75 2/21/2012 Influenza Age 5 to Adult 8/1/2018 Allergies as of 8/9/2018  Review Complete On: 8/9/2018 By: NORMAN Gerardo No Known Allergies Current Immunizations  Reviewed on 4/5/2018 No immunizations on file. Not reviewed this visit You Were Diagnosed With   
  
 Codes Comments Intestinal malabsorption, unspecified type    -  Primary ICD-10-CM: K90.9 ICD-9-CM: 579.9 S/P laparoscopic sleeve gastrectomy     ICD-10-CM: P21.27 ICD-9-CM: V45.86 Vitals BP Pulse Temp Resp Height(growth percentile) Weight(growth percentile) 129/60 (BP 1 Location: Left arm, BP Patient Position: Sitting) 67 97.1 °F (36.2 °C) 16 6' 5\" (1.956 m) 306 lb 4.8 oz (138.9 kg) SpO2 BMI Smoking Status 99% 36.32 kg/m2 Never Smoker Vitals History BMI and BSA Data Body Mass Index Body Surface Area  
 36.32 kg/m 2 2.75 m 2 Preferred Pharmacy Pharmacy Name Phone [facility] Johanny Bain [facility] [phone] Your Updated Medication List  
  
   
This list is accurate as of 8/9/18 10:10 AM.  Always use your most recent med list.  
  
  
  
  
 ABILIFY 5 mg tablet Generic drug:  ARIPiprazole Take 5 mg by mouth daily. Indications: anxiety and depression buPROPion  mg SR tablet Commonly known as:  Barbette Gilbert Take 300 mg by mouth daily. 300 XR  Indications: ANXIETY WITH DEPRESSION  
  
 calcium citrate 200 mg (950 mg) tablet Take  by mouth daily. FORTESTA TD  
4 Pump(s) by TransDERmal route daily. Indications: Low T  
  
 multivitamin with iron chewable tablet Commonly known as:  Saurav Ramming Take 1 Tab by mouth daily. temazepam 30 mg capsule Commonly known as:  RESTORIL Take 30 mg by mouth nightly as needed for Sleep. TIKOSYN PO Take 0.5 mg by mouth two (2) times a day. A-Fib VITAMIN B-12 1,000 mcg sublingual tablet Generic drug:  cyanocobalamin Take 1,000 mcg by mouth daily. VITAMIN D3 1,000 unit Cap Generic drug:  cholecalciferol Take  by mouth daily. ZyrTEC 10 mg tablet Generic drug:  cetirizine Take 10 mg by mouth daily. Indications: Allergic Rhinitis Follow-up Instructions Return in about 2 months (around 10/9/2018). To-Do List   
 08/09/2018 Lab:  CBC WITH AUTOMATED DIFF   
  
 08/09/2018 Lab:  FERRITIN   
  
 08/09/2018 Lab:  IRON   
  
 08/09/2018 Lab:  METABOLIC PANEL, COMPREHENSIVE   
  
 08/09/2018 Lab:  VITAMIN B1, WHOLE BLOOD   
  
 08/09/2018 Lab:  VITAMIN B12 & FOLATE   
  
 08/09/2018 Lab:  VITAMIN D, 25 HYDROXY Patient Instructions Patient Instructions 1. Remember hydration goals - minimum of 64 ounces of liquids per day (dehydration is the number one reason for hospital readmission). 2. Continue to monitor carbohydrate and protein intake you need a minimum of  Grams of protein daily- remember to keep your total carbohydrates to 50 grams or less per day for best results. 3. Continue to work towards exercise goals - 60-90 minutes, 5 times a week minimum of deliberate, aerobic exercise is the ultimate goal with strength training 2 times each week. Refer to Andela for  information. 4. Remember to take vitamins as directed in your handbook. 5. Attend support group the 2nd Thursday of each month. 6. Constipation: Milk of Magnesia is for immediate relief. Miralax is to be used every day if constipation is a chronic problem. 7. Diarrhea: patients will occasionally develop lactose intolerance after surgery. Check to see if your protein shake has whey in it. If it does try one that does not have whey and stop all yogurts, cheeses and milks to see if the diarrhea goes away. 8. Call us at [phone] or email us through [employer]" with questions,     concerns or worsening of condition, we have someone on call 24 hours a day. If you are unable to reach our office, you are to go to your Primary Care Physician or the Emergency Department. Supplement Resource Guide Importance of Protein:  
Maintains lean body mass, produces antibodies to fight off infections, heals wounds, minimizes hair loss, helps to give you energy, helps with satiety, and keeping you full between meals. Importance of Calcium: 
Needed for healthy bones and teeth, normal blood clotting, and nervous system functioning, higher risk of osteoporosis and bone disease with non-compliance. Importance of Multivitamins: Many functions.   Supply you with extra nutrients that you may be missing from food. May lead to iron deficiency anemia, weakness, fatigue, and many other symptoms with non-compliance. Importance of B Vitamins: 
Important for red blood cell formation, metabolism, energy, and helps to maintain a healthy nervous system. Protein Supplement Find one you like now. Use immediately after surgery. Look for: 
35-50g protein each day from your protein supplement once you reach the progression diet. 0-3 g fat per serving 0-3 g sugar per serving Protein drinks should be split in separate dosages. Recommend: Lifelong 1 year + Calcium Supplement:  
 
Start taking within a month after surgery. Look for: Calcium Citrate Plus D (1500 mg per day) Recommend: Citracal 
 
 . Avoid chocolate chewable calcium. Can use chewable bariatric or GNC brand or similar chewable. The body cannot absorb more than 500-600 mg of calcium at a time. Take for Life Multi-vitamin Supplement:   
 
Start immediately after surgery: any complete chewable, such as: [employer]s Complete chewables. Avoid [place] sours or gummies. They lack iron and other important nutrients and also have added sugar. Continue with chewable vitamin or change to adult complete multivitamin one month after surgery. Menstruating women can take a prenatal vitamin. Make sure has at least 18 mg iron and 246-802 mcg folic acid Vitamin B12, B Complex Vitamin, and Biotin Start taking within a month after surgery. Vitamin B12:  1000 mcg of Vitamin B12 three times weekly Must take sublingually (meaning you take it under your tongue) or in a liquid drop form for easy absorption. B Complex Vitamin: Take a pill or liquid drop form once daily. Biotin: This vitamin can help prevent hair loss. Recommend 5mg  
(5000 mcg) a day Biotin is Optional  
 
 
 
 
 
 
  
Introducing \[facility] [place] [facility]\"" & HEALTH SERVICES!    
 Melia Valentin introduces [employer] patient portal. Now you can access parts of your medical record, email your doctor's office, and request medication refills online. 1. In your internet browser, go to [email or web address] 2. Click on the First Time User? Click Here link in the Sign In box. You will see the New Member Sign Up page. 3. Enter your WHObyYOU Access Code exactly as it appears below. You will not need to use this code after youve completed the sign-up process. If you do not sign up before the expiration date, you must request a new code. · WHO[employer]YOU Access Code: [email or web address] Expires: 10/4/2018  9:12 AM 
 
4. Enter the last four digits of your Social Security Number (xxxx) and Date of Birth (mm/dd/yyyy) as indicated and click Submit. You will be taken to the next sign-up page. 5. Create a WHObyYOU ID. This will be your WHObyYOU login ID and cannot be changed, so think of one that is secure and easy to remember. 6. Create a WHObyYOU password. You can change your password at any time. 7. Enter your Password Reset Question and Answer. This can be used at a later time if you forget your password. 8. Enter your e-mail address. You will receive e-mail notification when new information is available in [address] 9. Click Sign Up. You can now view and download portions of your medical record. 10. Click the Download Summary menu link to download a portable copy of your medical information. If you have questions, please visit the Frequently Asked Questions section of the WHObyYOU website. Remember, WHObyYOU is NOT to be used for urgent needs. For medical emergencies, dial 911. Now available from your iPhone and Android! Please provide this summary of care documentation to your next provider. Your primary care clinician is listed as Kit Patel. If you have any questions after today's visit, please call [phone].

## 2018-08-09 NOTE — PROGRESS NOTES
Subjective:      Theresa Campos is a 66 y.o. male is now 4 months status post conversion of lap band to sleeve gastrectomy. Doing well overall. He has lost a total of 47 pounds since surgery. Body mass index is 36.32 kg/(m^2). Has lost 28% of EBW. Currently on a solid food diet without difficulty, reports no issues and denies vomiting and abdominal pain. Taking in 128oz water daily. Sources of protein include chicken, beef and eggs. He is not exercising on a regular basis. He states he has been eating carbohydrates on a daily basis. Bowel movements are regular. The patient is not having any pain. . The patient is compliant with multivitamins, calcium, Vit D and B12 supplements. Weight Loss Metrics 8/9/2018 6/25/2018 6/8/2018 5/3/2018 4/19/2018 4/5/2018 3/26/2018   Pre op / Initial Wt - - - - - - -   Today's Wt 306 lb 4.8 oz 309 lb 309 lb 9.6 oz 322 lb 1.6 oz 327 lb 1.6 oz 350 lb 3 oz 353 lb   BMI 36.32 kg/m2 36.64 kg/m2 36.71 kg/m2 38.2 kg/m2 38.79 kg/m2 41.53 kg/m2 41.86 kg/m2   Ideal Body Wt - - - - - - -          Comorbidities:    Hypertension: not applicable  Diabetes: resolved  Obstructive Sleep Apnea: unchanged, is using CPAP  Hyperlipidemia: not applicable  Stress Urinary Incontinence: not applicable  Gastroesophageal Reflux: not applicable  Weight related arthropathy:not applicable     Patient Active Problem List   Diagnosis Code    High triglycerides E78.1    Sleep apnea G47.30    Chronic back pain M54.9, G89.29    Hypertension I10    Atrial fibrillation (HCC) I48.91    History of atrial fibrillation Z86.79    Depression F32.9    NAFLD (nonalcoholic fatty liver disease) K76.0    S/P laparoscopic sleeve gastrectomy Z98.84    H/O lumbosacral spine surgery Z98.890    S/P appendectomy Z90.49    Severe obesity (BMI 35.0-39. 9) with comorbidity ([facility] [address] E66.01    Intestinal malabsorption K90.9        Past Medical History:   Diagnosis Date    Anxiety     Depression     Essential hypertension, benign 8/1/2012    High triglycerides 8/1/2012    History of atrial fibrillation     cardioverted / no recurrance    Hypercholesterolemia     PT denies this at triage 11/19/12    Hypertension 2012    No meds    Morbid obesity ([facility] [address]     Morbid obesity with BMI of 40.0-44.9, adult (HCC)     Nausea & vomiting 1990    Pre-diabetes     Sleep apnea     instructed to bring CPAP day of Surgery; rate 12       Past Surgical History:   Procedure Laterality Date    CARDIAC CATHETERIZATION  1/2013    HX APPENDECTOMY  1990    HX GASTRIC BYPASS      sleve    HX HEENT      UPPP    HX LUMBAR DISKECTOMY      HX SHOULDER ARTHROSCOPY Left 2017    LAP, PLACE ADJUST GASTR BAND      removed 2017    [place] LAP, BENITEZ RESTRICT PROC, LONGITUDINAL GASTRECTOMY      4/5/2018       Current Outpatient Prescriptions   Medication Sig Dispense Refill    cholecalciferol (VITAMIN D3) 1,000 unit cap Take  by mouth daily.  cyanocobalamin (VITAMIN B-12) 1,000 mcg sublingual tablet Take 1,000 mcg by mouth daily.  calcium citrate 200 mg (950 mg) tablet Take  by mouth daily.  multivitamin with iron (FLINTSTONES) chewable tablet Take 1 Tab by mouth daily.  ARIPiprazole (ABILIFY) 5 mg tablet Take 5 mg by mouth daily. Indications: anxiety and depression      temazepam (RESTORIL) 30 mg capsule Take 30 mg by mouth nightly as needed for Sleep.  TESTOSTERONE (FORTESTA TD) 4 Pump(s) by TransDERmal route daily. Indications: Low T      DOFETILIDE (TIKOSYN PO) Take 0.5 mg by mouth two (2) times a day. A-Fib      cetirizine (ZYRTEC) 10 mg tablet Take 10 mg by mouth daily. Indications: Allergic Rhinitis      buPROPion SR (WELLBUTRIN SR) 150 mg SR tablet Take 300 mg by mouth daily. 300 XR  Indications: ANXIETY WITH DEPRESSION      Omeprazole delayed release (PRILOSEC D/R) 20 mg tablet Take 1 Tab by mouth daily.  OTC 30 Tab 1       No Known Allergies    Review of Systems:  General - No history or complaints of unexpected fever or chills  Head/Neck - No history or complaints of headache or dizziness  Cardiac - No history or complaints of chest pain, palpitations, or shortness of breath  Pulmonary - No history or complaints of shortness of breath or productive cough  Gastrointestinal - as noted above  Genitourinary - No history or complaints of hematuria/dysuria or renal lithiasis  Musculoskeletal - No history or complaints of joint  muscular weakness  Hematologic - No history of any bleeding episodes  Neurologic - No history or complaints of  migraine headaches or neurologic symptoms    Objective:     Visit Vitals    /60 (BP 1 Location: Left arm, BP Patient Position: Sitting)    Pulse 67    Temp 97.1 °F (36.2 °C)    Ht 6' 5\" (1.956 m)    Wt 138.9 kg (306 lb 4.8 oz)    SpO2 99%    BMI 36.32 kg/m2       General:  alert, cooperative, no distress, appears stated age   Chest: lungs clear to auscultation, breath sounds equal and symmetric, no rhonchi, rales or wheezes, no accessory muscle use   Cor:   Regular rate and rhythm or without murmur or extra heart sounds   Abdomen: soft, bowel sounds active, non-tender, no masses or organomegaly   Incisions:   healing well, no drainage, no erythema, no hernia, no seroma, no swelling, no dehiscence, incision well approximated       Assessment:   History of Morbid obesity, status post conversion of lap band to sleeve gastrectomy. Doing well postoperatively. MAGDA - continue CPAP    Plan:     1. Increase activity to the goal of 30 minutes daily  2. Discussed patients weight loss goals and dietary choices in relation to goals. 3. Reminded to measure portions, continue high protein, low carbohydrate diet. Reminded to eat regularly, to eat slowly & not to drink with meals. 4. Continue vitamin supplementation  5. Continue current medications and follow up with PCP for management of regimen. 6. Continue cardio exercise and add resistance exercises.  60-90 minutes of aerobic activity 5 days a week and strength training 2 days each week. 7. Encouraged to attend support group   8. Patient to complete labs before next visit. Lab slip given today. 9. I have discussed this plan with patient and they verbalized understanding  10. Follow up in 2 months or sooner if patient has questions, concerns or worsening of condition, if unable to reach our office, patient should report to the ED. 6. Mr. Tejas Fitzgerald has a reminder for a \"due or due soon\" health maintenance. I have asked that he contact his primary care provider for a follow-up on this health maintenance.

## 2018-08-12 LAB — VIT B1 BLD-SCNC: 162.9 NMOL/L (ref 66.5–200)

## 2018-08-14 ENCOUNTER — TELEPHONE (OUTPATIENT)
Dept: SURGERY | Age: 58
End: 2018-08-14

## 2018-08-14 NOTE — TELEPHONE ENCOUNTER
I called patient to review labs. His Bilirubin has continued to be elevated and I just wanted to inform him. He is already a patient of Dr. Josiah Roberts. Patient verbalized understanding. Patient is to contact our office with any problems, worsening of condition, questions or concerns. If we are not available or unable to be reached, patient is to proceed to the Emergency Department.

## 2018-11-14 ENCOUNTER — OFFICE VISIT (OUTPATIENT)
Dept: SURGERY | Age: 58
End: 2018-11-14

## 2018-11-14 VITALS
DIASTOLIC BLOOD PRESSURE: 79 MMHG | OXYGEN SATURATION: 99 % | HEIGHT: 77 IN | TEMPERATURE: 98 F | WEIGHT: 313.1 LBS | BODY MASS INDEX: 36.97 KG/M2 | SYSTOLIC BLOOD PRESSURE: 140 MMHG | HEART RATE: 60 BPM

## 2018-11-14 DIAGNOSIS — Z98.84 S/P BARIATRIC SURGERY: ICD-10-CM

## 2018-11-14 DIAGNOSIS — K90.9 INTESTINAL MALABSORPTION, UNSPECIFIED TYPE: Primary | ICD-10-CM

## 2018-11-14 NOTE — PATIENT INSTRUCTIONS
Patient Instructions 1. Remember hydration goals - minimum of 64 ounces of liquids per day (dehydration is the number one reason for hospital readmission). 2. Continue to monitor carbohydrate and protein intake you need a minimum of  Grams of protein daily- remember to keep your total carbohydrates to 50 grams or less per day for best results. 3. Continue to work towards exercise goals - 60-90 minutes, 5 times a week minimum of deliberate, aerobic exercise is the ultimate goal with strength training 2 times each week. Refer to [employer] for  information. 4. Remember to take vitamins as directed in your handbook. 5. Attend support group the 2nd Thursday of each month. 6. Constipation: Milk of Magnesia is for immediate relief. Miralax is to be used every day if constipation is a chronic problem. 7. Diarrhea: patients will occasionally develop lactose intolerance after surgery. Check to see if your protein shake has whey in it. If it does try one that does not have whey and stop all yogurts, cheeses and milks to see if the diarrhea goes away. 8. Call us at [phone] or email us through [facility]" with questions,     concerns or worsening of condition, we have someone on call 24 hours a day. If you are unable to reach our office, you are to go to your Primary Care Physician or the Emergency Department. Supplement Resource Guide Importance of Protein:  
Maintains lean body mass, produces antibodies to fight off infections, heals wounds, minimizes hair loss, helps to give you energy, helps with satiety, and keeping you full between meals. Importance of Calcium: 
Needed for healthy bones and teeth, normal blood clotting, and nervous system functioning, higher risk of osteoporosis and bone disease with non-compliance. Importance of Multivitamins: Many functions. Supply you with extra nutrients that you may be missing from food. May lead to iron deficiency anemia, weakness, fatigue, and many other symptoms with non-compliance. Importance of B Vitamins: 
Important for red blood cell formation, metabolism, energy, and helps to maintain a healthy nervous system. Protein Supplement Find one you like now. Use immediately after surgery. Look for: 
35-50g protein each day from your protein supplement once you reach the progression diet. 0-3 g fat per serving 0-3 g sugar per serving Protein drinks should be split in separate dosages. Recommend: Lifelong 1 year +Calcium Supplement:  
 
Start taking within a month after surgery. Look for: Calcium Citrate Plus D (1500 mg per day) Recommend: Citracal 
 
 . Avoid chocolate chewable calcium. Can use chewable bariatric or GNC brand or similar chewable. The body cannot absorb more than 500-600 mg of calcium at a time. Take for Life Multi-vitamin Supplement:   
Start immediately after surgery: any complete chewable, such as: [employer]s Complete chewables. Avoid [place] sours or gummies. They lack iron and other important nutrients and also have added sugar. Continue with chewable vitamin or change to adult complete multivitamin one month after surgery. Menstruating women can take a prenatal vitamin. Make sure has at least 18 mg iron and 145-066 mcg folic acid Vitamin B12, B Complex Vitamin, and Biotin Start taking within a month after surgery. Vitamin B12:  1000 mcg of Vitamin B12 three times weekly Must take sublingually (meaning you take it under your tongue) or in a liquid drop form for easy absorption. B Complex Vitamin: Take a pill or liquid drop form once daily. Biotin: This vitamin can help prevent hair loss. Recommend 5mg  
(5000 mcg) a day Biotin is Optional

## 2018-11-14 NOTE — PROGRESS NOTES
Subjective:  
  
Jv Mcleod is a 66 y.o. male is now 7 months status post conversion of lap band to sleeve gastrectomy. Doing well overall. He has lost a total of 40 pounds since surgery. Body mass index is 37.13 kg/m². Has lost 24% of EBW. Currently on a solid food diet without difficulty, reports no issues and denies vomiting and abdominal pain. Taking in 90+oz water daily. Sources of protein include meats. Has been eating carbohydrates on a daily basis. Patient has not been exercising. Bowel movements are regular. The patient is not having any pain. . The patient is compliant with multivitamins, calcium, Vit D and B12 supplements. Weight Loss Metrics 11/14/2018 8/9/2018 6/25/2018 6/8/2018 5/3/2018 4/19/2018 4/5/2018 Pre op / Initial Wt - - - - - - - Today's Wt 313 lb 1.6 oz 306 lb 4.8 oz 309 lb 309 lb 9.6 oz 322 lb 1.6 oz 327 lb 1.6 oz 350 lb 3 oz BMI 37.13 kg/m2 36.32 kg/m2 36.64 kg/m2 36.71 kg/m2 38.2 kg/m2 38.79 kg/m2 41.53 kg/m2 Ideal Body Wt - - - - - - - Comorbidities: Hypertension: not applicable Diabetes: resolved Obstructive Sleep Apnea: unchanged, is using CPAP Hyperlipidemia: not applicable Stress Urinary Incontinence: not applicable Gastroesophageal Reflux: not applicable Weight related arthropathy:not applicable Patient Active Problem List  
Diagnosis Code  High triglycerides E78.1  Sleep apnea G47.30  Chronic back pain M54.9, G89.29  
 Hypertension I10  Atrial fibrillation (HCC) I48.91  
 History of atrial fibrillation Z86.79  
 Depression F32.9  
 NAFLD (nonalcoholic fatty liver disease) K76.0  
 S/P laparoscopic sleeve gastrectomy Z98.84  
 H/O lumbosacral spine surgery Z98.890  
 S/P appendectomy Z90.49  Severe obesity (BMI 35.0-39. 9) with comorbidity ([facility] [address] E66.01  
 Intestinal malabsorption K90.9 Past Medical History:  
Diagnosis Date  Anxiety  Depression  Essential hypertension, benign 8/1/2012  High triglycerides 8/1/2012  History of atrial fibrillation   
 cardioverted / no recurrance  Hypercholesterolemia PT denies this at triage 11/19/12  Hypertension 2012 No meds  Morbid obesity ([facility] [address]  Morbid obesity with BMI of 40.0-44.9, adult ([facility] [address]  Nausea & vomiting 1990  Pre-diabetes  Sleep apnea   
 instructed to bring CPAP day of Surgery; rate 12 Past Surgical History:  
Procedure Laterality Date  CARDIAC CATHETERIZATION  1/2013  HX APPENDECTOMY  1990  
 HX GASTRIC BYPASS    
 sleve  HX HEENT    
 UPPP  
 HX LUMBAR DISKECTOMY  HX SHOULDER ARTHROSCOPY Left 2017  LAP, PLACE ADJUST GASTR BAND    
 removed 2017  [place] LAP, BENITEZ RESTRICT PROC, LONGITUDINAL GASTRECTOMY    
 4/5/2018 Current Outpatient Medications Medication Sig Dispense Refill  cholecalciferol (VITAMIN D3) 1,000 unit cap Take  by mouth daily.  cyanocobalamin (VITAMIN B-12) 1,000 mcg sublingual tablet Take 1,000 mcg by mouth daily.  calcium citrate 200 mg (950 mg) tablet Take  by mouth daily.  multivitamin with iron (FLINTSTONES) chewable tablet Take 1 Tab by mouth daily.  ARIPiprazole (ABILIFY) 5 mg tablet Take 5 mg by mouth daily. Indications: anxiety and depression  temazepam (RESTORIL) 30 mg capsule Take 30 mg by mouth nightly as needed for Sleep.  TESTOSTERONE (FORTESTA TD) 4 Pump(s) by TransDERmal route daily. Indications: Low T    
 DOFETILIDE (TIKOSYN PO) Take 0.5 mg by mouth two (2) times a day. A-Fib  cetirizine (ZYRTEC) 10 mg tablet Take 10 mg by mouth daily. Indications: Allergic Rhinitis  buPROPion SR (WELLBUTRIN SR) 150 mg SR tablet Take 300 mg by mouth daily. 300 XR  Indications: ANXIETY WITH DEPRESSION No Known Allergies Review of Systems: 
General - No history or complaints of unexpected fever or chills Head/Neck - No history or complaints of headache or dizziness Cardiac - No history or complaints of chest pain, palpitations, or shortness of breath Pulmonary - No history or complaints of shortness of breath or productive cough Gastrointestinal - as noted above Genitourinary - No history or complaints of hematuria/dysuria or renal lithiasis Musculoskeletal - No history or complaints of joint  muscular weakness Hematologic - No history of any bleeding episodes Neurologic - No history or complaints of  migraine headaches or neurologic symptoms Objective:  
 
Visit Vitals /79 (BP 1 Location: Left arm, BP Patient Position: Sitting) Pulse 60 Temp 98 °F (36.7 °C) Ht 6' 5\" (1.956 m) Wt 142 kg (313 lb 1.6 oz) SpO2 99% BMI 37.13 kg/m² General:  alert, cooperative, no distress, appears stated age Chest: lungs clear to auscultation, breath sounds equal and symmetric, no rhonchi, rales or wheezes, no accessory muscle use Cor:   Regular rate and rhythm or without murmur or extra heart sounds Abdomen: soft, bowel sounds active, non-tender, no masses or organomegaly Incisions:   healed well Assessment:  
History of Morbid obesity, status post laparoscopic adjustable gastric band surgery. Doing well postoperatively. Exercise a minimum of 30 minutes daily Strict diet control - patient education given on the effects of carbohydrates on weight loss MAGDA - CPAP Plan:  
 
1. Increase activity to the goal of 30 minutes daily 2. Discussed patients weight loss goals and dietary choices in relation to goals. 3. Reminded to measure portions, continue high protein, low carbohydrate diet. Reminded to eat regularly, to eat slowly & not to drink with meals. 4. Continue vitamin supplementation 5. Continue current medications and follow up with PCP for management of regimen. 6. Continue cardio exercise and add resistance exercises. 60-90 minutes of aerobic activity 5 days a week and strength training 2 days each week. 7. Encouraged to attend support group 8. I have discussed this plan with patient and they verbalized understanding 9. Follow up in 3 months or sooner if patient has questions, concerns or worsening of condition, if unable to reach our office, patient should report to the ED. 10. Mr. Maira Bravo has a reminder for a \"due or due soon\" health maintenance. I have asked that he contact his primary care provider for a follow-up on this health maintenance.

## 2019-02-14 ENCOUNTER — OFFICE VISIT (OUTPATIENT)
Dept: SURGERY | Age: 59
End: 2019-02-14

## 2019-02-14 VITALS
OXYGEN SATURATION: 98 % | WEIGHT: 315 LBS | SYSTOLIC BLOOD PRESSURE: 121 MMHG | TEMPERATURE: 97.6 F | BODY MASS INDEX: 37.19 KG/M2 | DIASTOLIC BLOOD PRESSURE: 71 MMHG | HEART RATE: 53 BPM | HEIGHT: 77 IN

## 2019-02-14 DIAGNOSIS — E78.1 HIGH TRIGLYCERIDES: ICD-10-CM

## 2019-02-14 DIAGNOSIS — G47.30 SLEEP APNEA, UNSPECIFIED TYPE: ICD-10-CM

## 2019-02-14 DIAGNOSIS — F32.A DEPRESSION, UNSPECIFIED DEPRESSION TYPE: ICD-10-CM

## 2019-02-14 DIAGNOSIS — Z98.84 S/P LAPAROSCOPIC SLEEVE GASTRECTOMY: ICD-10-CM

## 2019-02-14 DIAGNOSIS — K90.9 INTESTINAL MALABSORPTION, UNSPECIFIED TYPE: Primary | ICD-10-CM

## 2019-02-14 DIAGNOSIS — I48.91 ATRIAL FIBRILLATION, UNSPECIFIED TYPE (HCC): ICD-10-CM

## 2019-02-14 DIAGNOSIS — I10 ESSENTIAL HYPERTENSION: ICD-10-CM

## 2019-02-14 RX ORDER — POTASSIUM CHLORIDE 20 MEQ/1
TABLET, EXTENDED RELEASE ORAL
COMMUNITY
Start: 2018-03-27

## 2019-02-14 RX ORDER — MINERAL OIL
180 ENEMA (ML) RECTAL
COMMUNITY

## 2019-02-14 NOTE — PROGRESS NOTES
Subjective:     Lizette Tinoco  is a 66 y.o. male who presents for follow-up about 9 months following laparoscopic sleeve gastrectomy and conversion band to laparascopic sleeve gastrectomy. He has lost a total of 33 pounds since surgery. Body mass index is 38.04 kg/m². . EBWL is (19%). He has gained 7 lbs since his last visit. The patient presents today to assess their progress toward their goal of weight loss and to address any issues that may be present. Today the patient and I have reviewed their diet and how appropriate their food choices are. The following issues have been identified - weight gain. Surgery related complication: NA       He reports no real issues and denies vomiting, abdominal pain and diarrhea. The patients diet choices have been reviewed today and are as follows:      Breakfast: 2 eggs, cheese, chicken      Lunch: left overs (chicken fried steaks)      Snack: pork rinds      Supper :mashed potatoes, chicken, spaghetti    Patients pain score:0/10      The patient's exercise level: not active. Changes in his medical history and medications have been reviewed. Lots of stress, takes care of wife who is total care, son living with them who was injured in motor cycle accident in August. Has 9 family members in house and he is sole caregiver. Sees psychiatrist who 'just refills' his meds. Does not get therapy. Just saw cardiologist yesterday, still on tikosyn for afib. Lower HR in 50s, denies symptoms.     Comorbidities:    Hypertension: not applicable  Diabetes: resolved  Obstructive Sleep Apnea: unchanged, is using CPAP, still says he wakes up about 4 times a night staring a clock, thinks its related to stress  Hyperlipidemia: not applicable  Stress Urinary Incontinence: not applicable  Gastroesophageal Reflux: not applicable  Weight related arthropathy:not applicable        Patient Active Problem List   Diagnosis Code    High triglycerides E78.1    Sleep apnea G47.30    Chronic back pain M54.9, G89.29    Hypertension I10    Atrial fibrillation (HCC) I48.91    History of atrial fibrillation Z86.79    Depression F32.9    NAFLD (nonalcoholic fatty liver disease) K76.0    S/P laparoscopic sleeve gastrectomy Z98.84    H/O lumbosacral spine surgery Z98.890    S/P appendectomy Z90.49    Severe obesity (BMI 35.0-39. 9) with comorbidity ([facility] [address] E66.01    Intestinal malabsorption K90.9     Past Medical History:   Diagnosis Date    Anxiety     Depression     Essential hypertension, benign 8/1/2012    High triglycerides 8/1/2012    History of atrial fibrillation     cardioverted / no recurrance    Hypercholesterolemia     PT denies this at triage 11/19/12    Hypertension 2012    No meds    Morbid obesity ([facility] [address]     Morbid obesity with BMI of 40.0-44.9, adult (HCC)     Nausea & vomiting 1990    Pre-diabetes     Sleep apnea     instructed to bring CPAP day of Surgery; rate 12     Past Surgical History:   Procedure Laterality Date    CARDIAC CATHETERIZATION  1/2013    HX APPENDECTOMY  1990    HX GASTRIC BYPASS      sleve    HX HEENT      UPPP    HX LUMBAR DISKECTOMY      HX SHOULDER ARTHROSCOPY Left 2017    LAP, PLACE ADJUST GASTR BAND      removed 2017    [place] LAP, BENITEZ RESTRICT PROC, LONGITUDINAL GASTRECTOMY      4/5/2018     Current Outpatient Medications   Medication Sig Dispense Refill    fexofenadine (ALLEGRA) 180 mg tablet Take 180 mg by mouth.  potassium chloride (KLOR-CON M20) 20 mEq tablet TAKE 1 TABLET DAILY      cholecalciferol (VITAMIN D3) 1,000 unit cap Take  by mouth daily.  cyanocobalamin (VITAMIN B-12) 1,000 mcg sublingual tablet Take 1,000 mcg by mouth daily.  calcium citrate 200 mg (950 mg) tablet Take  by mouth daily.  multivitamin with iron (FLINTSTONES) chewable tablet Take 1 Tab by mouth daily.  ARIPiprazole (ABILIFY) 5 mg tablet Take 5 mg by mouth daily.  Indications: anxiety and depression      temazepam (RESTORIL) 30 mg capsule Take 30 mg by mouth nightly as needed for Sleep.  TESTOSTERONE (FORTESTA TD) 4 Pump(s) by TransDERmal route daily. Indications: Low T      DOFETILIDE (TIKOSYN PO) Take 0.5 mg by mouth two (2) times a day. A-Fib      buPROPion SR (WELLBUTRIN SR) 150 mg SR tablet Take 300 mg by mouth daily. 300 XR  Indications: ANXIETY WITH DEPRESSION      cetirizine (ZYRTEC) 10 mg tablet Take 10 mg by mouth daily. Indications: Allergic Rhinitis          Review of Symptoms:       General - No history or complaints of unexpected fever or chills  Head/Neck - No history or complaints of headache or dizziness  Cardiac - No history or complaints of chest pain, palpitations, or shortness of breath  Pulmonary - No history or complaints of shortness of breath or productive cough  Gastrointestinal - as noted above  Genitourinary - No history or complaints of hematuria/dysuria or renal lithiasis  Musculoskeletal - No history or complaints of joint  muscular weakness  Hematologic - No history of any bleeding episodes  Neurologic - No history or complaints of  migraine headaches or neurologic symptoms                     Objective:     Visit Vitals  /71 (BP 1 Location: Left arm, BP Patient Position: Sitting)   Pulse (!) 53   Temp 97.6 °F (36.4 °C)   Ht 6' 5\" (1.956 m)   Wt 145.5 kg (320 lb 12.8 oz)   SpO2 98%   BMI 38.04 kg/m²        Physical Exam:    General:  alert, cooperative, no distress, appears stated age   Lungs:   clear to auscultation bilaterally   Heart:  Regular rate and rhythm, S1S2 present or without murmur or extra heart sounds   Abdomen:   abdomen is soft without significant tenderness, masses, organomegaly or guarding; Incisions: healed         Assessment:     1. History of Morbid obesity, status post conversion from band to laparoscopic sleeve gastrectomy. Doing well, no concerns from a surgical standpoint. But the patient has experienced weight regain, likely from dietary choices, but also stress.  Discussed importance of adequate protein intake, appropriate amount of calories and maintaining carbs below 50g daily. Recommend dietary appointment to get back on track. Multiple handouts and resources given. Also, discussed role of stress/cortisol/etc. Recommend counseling or other avenue for stress management as able. 2. MAGDA - on CPAP, f/u pulmonology  3. Afib with asymptomatic bradycardia - monitor HR and symptoms, f/u cardiologist to see if adjustment in dose indicated  4. Depression - on Rx, f/u psychiatry, recommend counseling      Plan:     1. Remember to measure portions, continue low carbohydrate diet  2. Continue to concentrate on protein intake meeting daily requirements  3. Remember vitamin supplements. The importance of such was discussed regarding the malabsorptive issues that the surgery creates. 4. Exercise regimen appears to be: inadequate, understand time constraint but recommend finding time to increase movement to 150 minutes weekly  5. Try and attend support group if feasible. 6. Follow-up in 3 month(s). 7. Lab reviewed and appropriate changes made. Lab slip given for 1 year labs. Did not order CMP and CBC as checked by PCP and reviewed in Care Everywhere. 8. Total time spent with the patient 35 minutes.

## 2019-02-14 NOTE — PATIENT INSTRUCTIONS
Patient Instructions      1. Remember hydration goals - minimum of 64 ounces of liquids per day (dehydration is the number one reason for hospital readmission). 2. Continue to monitor carbohydrate and protein intake you need a minimum of 80-90 Grams of protein daily- remember to keep your total carbohydrates to 50 grams or less per day for best results, 800-1000 calories  3. Continue to work towards exercise goals - 60-90 minutes, 5 times a week minimum of deliberate, aerobic exercise is the ultimate goal with strength training 2 times each week. Refer to VSS Monitoring for  information. 4. Remember to take vitamins as directed. 5. Attend support group the 2nd Thursday of each month. 6. Use Miralax if you become constipated. 7. Call us at [phone] or email us through [email or web address]" with questions,     concerns or worsening of condition, we have someone on call 24 hours a day. If you are unable to reach our office, you are to go to your Primary Care Physician or the Emergency Department. 8. If any labs have been ordered as a part of this visit, you will only be contacted if found to be abnormal. If you would like to look at the results for yourself, you can sign up for 'My Chart\". [facility] office staff can offer you assistance with setting that up. Supplement Resource Guide    Importance of Protein:   Maintains lean body mass, produces antibodies to fight off infections, heals wounds, minimizes hair loss, helps to give you energy, helps with satiety, and keeping you full between meals. Importance of Calcium:  Needed for healthy bones and teeth, normal blood clotting, and nervous system functioning, higher risk of osteoporosis and bone disease with non-compliance. Importance of Multivitamins: Many functions. Supply you with extra nutrients that you may be missing from food.   May lead to iron deficiency anemia, weakness, fatigue, and many other symptoms with non-compliance. Importance of B Vitamins:  Important for red blood cell formation, metabolism, energy, and helps to maintain a healthy nervous system. Protein Supplement  Find one you like now. Use immediately after surgery. Look for:  35-50g protein each day from your protein supplement once you reach the progression diet. 0-3 g fat per serving  0-3 g sugar per serving    Protein drinks should be split in separate dosages. Recommend: Lifelong  1 year + Calcium Supplement:     Start taking within a month after surgery. Look for: Calcium Citrate Plus D (1500 mg per day)  Recommend: Citracal     .            Avoid chocolate chewable calcium. Can use chewable bariatric or [employer] brand or similar chewable. The body cannot absorb more than 500-600 mg of calcium at a time. Take for Life Multi-vitamin Supplement:      Start immediately after surgery: any complete chewable, such as: Dickinsons Complete chewables. Avoid Dickinson sours or gummies. They lack iron and other important nutrients and also have added sugar. Continue with chewable vitamin or change to adult complete multivitamin one month after surgery. Menstruating women can take a prenatal vitamin. Make sure has at least 18 mg iron and [id number] mcg folic acid   Vitamin Q28, B Complex Vitamin, and Biotin  Start taking within a month after surgery. Vitamin B12:  1000 mcg of Vitamin B12 three times weekly    Must take sublingually (meaning you take it under your tongue) or in a liquid drop form for easy absorption. B Complex Vitamin: Take a pill or liquid drop form once daily. Biotin: This vitamin can help prevent hair loss. Recommend 5mg   (5000 mcg) a day  Biotin is Optional         Walking for Exercise: Care Instructions  Your Care Instructions    Walking is one of the easiest ways to get the exercise you need for good health.  A brisk, 30-minute walk each day can help you feel better and have more energy. It can help you lower your risk of disease. Walking can help you keep your bones strong and your heart healthy. Check with your doctor before you start a walking plan if you have heart problems, other health issues, or you have not been active in a long time. Follow your doctor's instructions for safe levels of exercise. Follow-up care is a key part of your treatment and safety. Be sure to make and go to all appointments, and call your doctor if you are having problems. It's also a good idea to know your test results and keep a list of the medicines you take. How can you care for yourself at home? Getting started  · Start slowly and set a short-term goal. For example, walk for 5 or 10 minutes every day. · Bit by bit, increase the amount you walk every day. Try for at least 30 minutes on most days of the week. You also may want to swim, bike, or do other activities. · If finding enough time is a problem, it is fine to be active in blocks of 10 minutes or more throughout your day and week. · To get the heart-healthy benefits of walking, you need to walk briskly enough to increase your heart rate and breathing, but not so fast that you cannot talk comfortably. · Wear comfortable shoes that fit well and provide good support for your feet and ankles. Staying with your plan  · After you've made walking a habit, set a longer-term goal. You may want to set a goal of walking briskly for longer or walking farther. Experts say to do 2½ hours of moderate activity a week. A faster heartbeat is what defines moderate-level activity. · To stay motivated, walk with friends, coworkers, or pets. · Use a phone zeyad or pedometer to track your steps each day. Set a goal to increase your steps. Once you get there, set a higher goal. Aim for 10,000 steps a day. · If the weather keeps you from walking outside, go for walks at the mall with a friend.  Local schools and churches may have indoor gyms where you can walk. Fitting a walk into your workday  · Park several blocks away from work, or get off the bus a few stops early. · Use the stairs instead of the elevator, at least for a few floors. · Suggest holding meetings with colleagues during a walk inside or outside the building. · Use the restroom that is the farthest from your desk or workstation. · Use your morning and afternoon breaks to take quick 15-minute walks. Staying safe  · Know your surroundings. Walk in a well-lighted, safe place. If it is dark, walk with a partner. Wear light-colored clothing. If you can, buy a vest or jacket that reflects light. · Carry a cell phone for emergencies. · Drink plenty of water. Take a water bottle with you when you walk. This is very important if it is hot out. · Be careful not to slip on wet or icy ground. You can buy \"grippers\" for your shoes to help keep you from slipping. · Pay attention to your walking surface. Use sidewalks and paths. · If you have breathing problems like asthma or COPD, ask your doctor when it is safe for you to walk outdoors. Cold, dry air, smog, pollen, or other things in the air could cause breathing problems. Where can you learn more? Go to http://royer-neil.info/. Enter R159 in the search box to learn more about \"Walking for Exercise: Care Instructions. \"  Current as of: December 7, 2017  Content Version: 11.8  © 8509-3781 [employer] Care instructions adapted under license by [employer] (which disclaims liability or warranty for this information). If you have questions about a medical condition or this instruction, always ask your healthcare professional. [address] any warranty or liability for your use of this information.

## 2019-05-16 ENCOUNTER — OFFICE VISIT (OUTPATIENT)
Dept: SURGERY | Age: 59
End: 2019-05-16

## 2019-05-16 VITALS
HEART RATE: 57 BPM | HEIGHT: 77 IN | OXYGEN SATURATION: 99 % | WEIGHT: 315 LBS | DIASTOLIC BLOOD PRESSURE: 75 MMHG | BODY MASS INDEX: 37.19 KG/M2 | TEMPERATURE: 98 F | SYSTOLIC BLOOD PRESSURE: 127 MMHG

## 2019-05-16 DIAGNOSIS — Z98.84 S/P BARIATRIC SURGERY: ICD-10-CM

## 2019-05-16 DIAGNOSIS — K90.9 INTESTINAL MALABSORPTION, UNSPECIFIED TYPE: Primary | ICD-10-CM

## 2019-05-16 NOTE — PATIENT INSTRUCTIONS
Patient Instructions 1. Remember hydration goals - minimum of 64 ounces of liquids per day (dehydration is the number one reason for hospital readmission). 2. Sleep 7-9 hours each night to keep your metabolism up. 3. Continue to monitor carbohydrate and protein intake you need a minimum of  Grams of protein daily- remember to keep your total carbohydrates to 50 grams or less per day for best results. 4. To maximize weight loss keep your caloric intake between 800-1,200 calories daily. If you are exercising excessively, such as training for a marathon, you need to keep a food log and meet with the dietician so they can advise you on your diet choices, carbohydrate intake and caloric intake. 5. Continue to work towards exercise goals - 60-90 minutes, 5 times a week minimum of deliberate, aerobic exercise is the ultimate goal with strength training 2 times each week. Refer to [email or web address] for  information. 6. Remember to take vitamins as directed in your handbook. 7. Attend support group the 2nd Thursday of each month. 8. Constipation: Milk of Magnesia is for immediate relief only. Miralax is to be used every day if constipation is a chronic problem. 9. Diarrhea: patients will occasionally develop lactose intolerance after surgery. Check to see if your protein shake has whey in it. If it does try a protein powder or drink that does not have whey and stop all yogurts, cheeses and milks to see if the diarrhea goes away. 10. If you have had labs drawn. We will only call you if you have abnormal results. Otherwise you can access the lab results in \"[employer]\". You will only need the access code the first time you sign on.    
11. Call us at [phone] or email us through [facility] with questions,     concerns or worsening of condition, we have someone on call 24 hours a day. If you are unable to reach our office, you are to go to your Primary Care Physician or the Emergency Department. Supplement Resource Guide Importance of Protein:  
Maintains lean body mass, produces antibodies to fight off infections, heals wounds, minimizes hair loss, helps to give you energy, helps with satiety, and keeping you full between meals. Importance of Calcium: 
Needed for healthy bones and teeth, normal blood clotting, and nervous system functioning, higher risk of osteoporosis and bone disease with non-compliance. Importance of Multivitamins: Many functions. Supply you with extra nutrients that you may be missing from food. May lead to iron deficiency anemia, weakness, fatigue, and many other symptoms with non-compliance. Importance of B Vitamins: 
Important for red blood cell formation, metabolism, energy, and helps to maintain a healthy nervous system. Protein Supplement Liquid diet phase: consume 90-100g protein daily. Once you are eating consume 35-50g protein each day from your protein supplement. 0-3 g fat per serving 0-3 g sugar per serving The body can only absorb 30g of protein at one time, so do not consume more than that at one time. Recommend: Lifelong use Multi-vitamin Supplement:   
Start immediately after surgery: any complete chewable, such as: [employer]s Complete chewables. Avoid [place] sours or gummies. They lack iron and other important nutrients and also have added sugar. Continue with a chewable vitamin or change to an adult complete multivitamin one month after surgery. Menstruating women can take a prenatal vitamin. Make sure it has at least 18 mg iron and 396-476 mcg folic acid Calcium Supplement:  
 
Start taking within one month after surgery. Look for:  
Calcium Citrate Plus D (1500 mg per day) Recommend: Citracal 
 
Avoid chocolate chewable calcium. Can use chewable bariatric or [employer] brand or similar chewable. The body cannot absorb more than 500-600 mg of calcium at one time. Take for Life Vitamin B12 B Complex Vitamin Start taking both within one month after surgery. Vitamin B12 (sublingual): Take 1000 mcg of Vitamin B12 three times weekly Must take sublingually (meaning you put it under your tongue) or in a liquid drop form for easy absorption. B Complex Vitamin:  
Take one pill daily or liquid drop form daily; as directed on bottle. Take for Life Justen Bread and Susana Bread both are found in the freezer. Instead of pasta have zucchini noodles Instead of mashed potatoes use mashed cauliflower Instead of regular pizza, at [address] there is a chicken breast crust pizza

## 2019-05-16 NOTE — PROGRESS NOTES
Subjective:  
  
Britta Ponce is a 64 y.o. male is now 15 monthss status post conversion of lap band to laparoscopic sleeve gastrectomy. Doing well overall. He has lost a total of 30 pounds since surgery. Body mass index is 38.35 kg/m². Has lost 18% of EBW. Currently on a solid food diet without difficulty, reports slow weight regain and denies vomiting and abdominal pain. Taking in 90+oz water daily. Sources of protein include meat and dairy. He has been eating carbs, but in moderation. Carbs in the form of bread, pasta and mashed potatoes. Patient is sleeping 9  hours a night on average. He has not been exercising. He is the primary caregiver for his wife. His son recently was in a motorcycle accident and had to have his lower leg amputated. Patient has 9 family members living under his roof and is living with a significant amount of stress. He states that if he goes into the back yard to [employer], his son and grandchildren will follow him and his son is yelling at the kids and he has no free time or peace in his life. Bowel movements are regular. The patient is not having any pain. . The patient is compliant with multivitamins, calcium, Vit D and B12 supplements. Weight Loss Metrics 5/16/2019 2/14/2019 11/14/2018 8/9/2018 6/25/2018 6/8/2018 5/3/2018 Pre op / Initial Wt - - - - - - - Today's Wt 323 lb 6.4 oz 320 lb 12.8 oz 313 lb 1.6 oz 306 lb 4.8 oz 309 lb 309 lb 9.6 oz 322 lb 1.6 oz BMI 38.35 kg/m2 38.04 kg/m2 37.13 kg/m2 36.32 kg/m2 36.64 kg/m2 36.71 kg/m2 38.2 kg/m2 Ideal Body Wt - - - - - - - Comorbidities: Hypertension: not applicable Joy Otto Obstructive Sleep Apnea: unchanged, is using CPAP, still says he wakes up about 4 times a night staring a clock, thinks its related to stress Hyperlipidemia: not applicable Stress Urinary Incontinence: not applicable Gastroesophageal Reflux: not applicable Weight related arthropathy:not applicable 
  
  
 Patient Active Problem List  
Diagnosis Code  High triglycerides E78.1  Sleep apnea G47.30  Chronic back pain M54.9, G89.29  
 Hypertension I10  Atrial fibrillation (HCC) I48.91  
 History of atrial fibrillation Z86.79  
 Depression F32.9  
 NAFLD (nonalcoholic fatty liver disease) K76.0  
 S/P laparoscopic sleeve gastrectomy Z98.84  
 H/O lumbosacral spine surgery Z98.890  
 S/P appendectomy Z90.49  Severe obesity (BMI 35.0-39. 9) with comorbidity ([address] E66.01  
 Intestinal malabsorption K90.9 Past Medical History:  
Diagnosis Date  Anxiety  Depression  Essential hypertension, benign 8/1/2012  High triglycerides 8/1/2012  History of atrial fibrillation   
 cardioverted / no recurrance  Hypercholesterolemia PT denies this at triage 11/19/12  Hypertension 2012 No meds  Morbid [facility]  Morbid obesity with BMI of 40.0-44.9, adult ([facility]  Nausea & vomiting 1990  Pre-diabetes  Sleep apnea   
 instructed to bring CPAP day of Surgery; rate 12 Past Surgical History:  
Procedure Laterality Date  CARDIAC CATHETERIZATION  1/2013  HX APPENDECTOMY  1990  
 HX GASTRIC BYPASS    
 sleve  HX HEENT    
 UPPP  
 HX LUMBAR DISKECTOMY  HX SHOULDER ARTHROSCOPY Left 2017  LAP, PLACE ADJUST GASTR BAND    
 removed 2017  NV LAP, BENITEZ RESTRICT PROC, LONGITUDINAL GASTRECTOMY    
 4/5/2018 Current Outpatient Medications Medication Sig Dispense Refill  fexofenadine (ALLEGRA) 180 mg tablet Take 180 mg by mouth.  potassium chloride (KLOR-CON M20) 20 mEq tablet TAKE 1 TABLET DAILY  cholecalciferol (VITAMIN D3) 1,000 unit cap Take  by mouth daily.  cyanocobalamin (VITAMIN B-12) 1,000 mcg sublingual tablet Take 1,000 mcg by mouth daily.  calcium citrate 200 mg (950 mg) tablet Take  by mouth daily.  multivitamin with iron (FLINTSTONES) chewable tablet Take 1 Tab by mouth daily.  ARIPiprazole (ABILIFY) 5 mg tablet Take 5 mg by mouth daily. Indications: anxiety and depression  temazepam (RESTORIL) 30 mg capsule Take 30 mg by mouth nightly as needed for Sleep.  TESTOSTERONE (FORTESTA TD) 4 Pump(s) by TransDERmal route daily. Indications: Low T    
 DOFETILIDE (TIKOSYN PO) Take 500 mg by mouth two (2) times a day. A-Fib  buPROPion SR (WELLBUTRIN SR) 150 mg SR tablet Take 300 mg by mouth daily. 300 XR  Indications: ANXIETY WITH DEPRESSION No Known Allergies Review of Systems: 
General - No history or complaints of unexpected fever or chills Head/Neck - No history or complaints of headache or dizziness Cardiac - No history or complaints of chest pain, palpitations, or shortness of breath Pulmonary - No history or complaints of shortness of breath or productive cough Gastrointestinal - as noted above Genitourinary - No history or complaints of hematuria/dysuria or renal lithiasis Musculoskeletal - No history or complaints of joint  muscular weakness Hematologic - No history of any bleeding episodes Neurologic - No history or complaints of  migraine headaches or neurologic symptoms Objective:  
 
Visit Vitals /75 (BP 1 Location: Left arm, BP Patient Position: Sitting) Pulse (!) 57 Temp 98 °F (36.7 °C) Ht 6' 5\" (1.956 m) Wt 146.7 kg (323 lb 6.4 oz) SpO2 99% BMI 38.35 kg/m² General:  alert, cooperative, no distress, appears stated age Chest: lungs clear to auscultation, breath sounds equal and symmetric, no rhonchi, rales or wheezes, no accessory muscle use Cor:   Regular rate and rhythm or without murmur or extra heart sounds Abdomen: soft, bowel sounds active, non-tender, no masses or organomegaly Incisions:   healed well Assessment:  
History of Morbid obesity, status post conversion of lap band to laparoscopic sleeve gastrectomy. Doing well postoperatively. Slow weight regain - discussed how stress can increase cortisol levels which can cause weight gain and how exercise will help lower cortisol levels. Explained that even small amounts of exercise throughout the day will add up. I also recommended that he keep a food diary to assess his caloric and carbohydrate intake. His goal for weight loss is 800-1,200 calories daily and grams of carbs under 50 for the day. He does not want to meet with the dietitian, so I recommended replacement foods for the carbohydrates he has been indulging in such as cauliflower mashed instead of mashed potatoes, zucchini noodles instead of pasta and gave recommendations on low carbohydrate breads such as Susana and Justen Bread. MAGDA - continue with CPAP and f/u pulmonologist 
Afib - continue meds and f/u cardiology Depression - continue meds and f/u PCP Plan:  
 
1. Increase activity to the goal of 30 minutes daily 2. Discussed patients weight loss goals and dietary choices in relation to goals. 3. Sleep goal is 7-9 hours each night. Patient education given on the effects of sleep deprivation on weight control. 4. Reminded to measure portions, continue high protein, low carbohydrate diet. Reminded to eat regularly, to eat slowly & not to drink with meals. 5. Continue vitamin supplementation 6. Continue current medications and follow up with PCP for management of regimen. 7. Continue cardio exercise and add resistance exercises. 60-90 minutes of aerobic activity 5 days a week and strength training 2 days each week. 8. Encouraged to attend support group 9. I have discussed this plan with patient and they verbalized understanding 10. Follow up in 6 months or sooner if patient has questions, concerns or worsening of condition, if unable to reach our office, patient should report to the ED. 6. Mr. Dinah Kuo has a reminder for a \"due or due soon\" health maintenance. I have asked that he contact his primary care provider for a follow-up on this health maintenance.

## 2019-06-25 ENCOUNTER — OFFICE VISIT (OUTPATIENT)
Dept: HEMATOLOGY | Age: 59
End: 2019-06-25

## 2019-06-25 ENCOUNTER — HOSPITAL ENCOUNTER (OUTPATIENT)
Dept: LAB | Age: 59
Discharge: HOME | End: 2019-06-25
Payer: OTHER GOVERNMENT

## 2019-06-25 VITALS
TEMPERATURE: 98.7 F | WEIGHT: 315 LBS | BODY MASS INDEX: 37.19 KG/M2 | DIASTOLIC BLOOD PRESSURE: 75 MMHG | HEART RATE: 54 BPM | SYSTOLIC BLOOD PRESSURE: 135 MMHG | RESPIRATION RATE: 20 BRPM | HEIGHT: 77 IN | OXYGEN SATURATION: 98 %

## 2019-06-25 DIAGNOSIS — Z98.84 S/P LAPAROSCOPIC SLEEVE GASTRECTOMY: ICD-10-CM

## 2019-06-25 DIAGNOSIS — K75.81 NASH (NONALCOHOLIC STEATOHEPATITIS): Primary | ICD-10-CM

## 2019-06-25 DIAGNOSIS — K75.81 NASH (NONALCOHOLIC STEATOHEPATITIS): ICD-10-CM

## 2019-06-25 PROBLEM — K90.9 INTESTINAL MALABSORPTION: Status: RESOLVED | Noted: 2018-05-03 | Resolved: 2019-06-25

## 2019-06-25 LAB
ALBUMIN SERPL-MCNC: 4 G/DL (ref 3.4–5)
ALBUMIN/GLOB SERPL: 1.3 {RATIO} (ref 0.8–1.7)
ALP SERPL-CCNC: 65 U/L (ref 45–117)
ALT SERPL-CCNC: 51 U/L (ref 16–61)
ANION GAP SERPL CALC-SCNC: 6 MMOL/L (ref 3–18)
AST SERPL-CCNC: 26 U/L (ref 15–37)
BASOPHILS # BLD: 0 K/UL (ref 0–0.1)
BASOPHILS NFR BLD: 1 % (ref 0–2)
BILIRUB DIRECT SERPL-MCNC: 0.5 MG/DL (ref 0–0.2)
BILIRUB SERPL-MCNC: 2.2 MG/DL (ref 0.2–1)
BUN SERPL-MCNC: 18 MG/DL (ref 7–18)
BUN/CREAT SERPL: 19 (ref 12–20)
CALCIUM SERPL-MCNC: 9.1 MG/DL (ref 8.5–10.1)
CHLORIDE SERPL-SCNC: 111 MMOL/L (ref 100–108)
CO2 SERPL-SCNC: 27 MMOL/L (ref 21–32)
CREAT SERPL-MCNC: 0.93 MG/DL (ref 0.6–1.3)
DIFFERENTIAL METHOD BLD: ABNORMAL
EOSINOPHIL # BLD: 0.1 K/UL (ref 0–0.4)
EOSINOPHIL NFR BLD: 3 % (ref 0–5)
ERYTHROCYTE [DISTWIDTH] IN BLOOD BY AUTOMATED COUNT: 13.9 % (ref 11.6–14.5)
GLOBULIN SER CALC-MCNC: 3 G/DL (ref 2–4)
GLUCOSE SERPL-MCNC: 79 MG/DL (ref 74–99)
HCT VFR BLD AUTO: 44.9 % (ref 36–48)
HGB BLD-MCNC: 15.6 G/DL (ref 13–16)
LYMPHOCYTES # BLD: 1.6 K/UL (ref 0.9–3.6)
LYMPHOCYTES NFR BLD: 36 % (ref 21–52)
MCH RBC QN AUTO: 33.3 PG (ref 24–34)
MCHC RBC AUTO-ENTMCNC: 34.7 G/DL (ref 31–37)
MCV RBC AUTO: 95.7 FL (ref 74–97)
MONOCYTES # BLD: 0.5 K/UL (ref 0.05–1.2)
MONOCYTES NFR BLD: 10 % (ref 3–10)
NEUTS SEG # BLD: 2.2 K/UL (ref 1.8–8)
NEUTS SEG NFR BLD: 50 % (ref 40–73)
PLATELET # BLD AUTO: 146 K/UL (ref 135–420)
PMV BLD AUTO: 10.8 FL (ref 9.2–11.8)
POTASSIUM SERPL-SCNC: 4.4 MMOL/L (ref 3.5–5.5)
PROT SERPL-MCNC: 7 G/DL (ref 6.4–8.2)
RBC # BLD AUTO: 4.69 M/UL (ref 4.7–5.5)
SODIUM SERPL-SCNC: 144 MMOL/L (ref 136–145)
WBC # BLD AUTO: 4.4 K/UL (ref 4.6–13.2)

## 2019-06-25 PROCEDURE — 36415 COLL VENOUS BLD VENIPUNCTURE: CPT

## 2019-06-25 PROCEDURE — 80048 BASIC METABOLIC PNL TOTAL CA: CPT

## 2019-06-25 PROCEDURE — 85025 COMPLETE CBC W/AUTO DIFF WBC: CPT

## 2019-06-25 PROCEDURE — 80076 HEPATIC FUNCTION PANEL: CPT

## 2019-06-25 PROCEDURE — 82107 ALPHA-FETOPROTEIN L3: CPT

## 2019-06-25 RX ORDER — HYDROXYZINE 50 MG/1
TABLET, FILM COATED ORAL
COMMUNITY
Start: 2019-06-23

## 2019-06-25 RX ORDER — BUSPIRONE HYDROCHLORIDE 10 MG/1
10 TABLET ORAL DAILY
COMMUNITY
Start: 2019-06-24

## 2019-06-25 RX ORDER — SILDENAFIL 50 MG/1
2 TABLET, FILM COATED ORAL
COMMUNITY
Start: 2015-09-14 | End: 2020-12-22

## 2019-06-25 NOTE — PROGRESS NOTES
[facility], MD, [address], Cite Melissa Delarosa, MD Alycia Live, VIOLA Jones, [facility]     Sonia Rubin [facility]   KAMI [place] NP [address]    at [address], [address] Oralia Dent  22.    [phone]    FAX: [address] [place] [address]    at [address], [address] [facility], [address]    [phone]    FAX: [phone]         Patient Care Team:  Shira Bhatti MD as PCP - General (Internal Medicine)  Bernie Torres MD (General Surgery)  Talha Olivares MD (Internal Medicine)      Problem List  Date Reviewed: 6/25/2019          Codes Class Noted    Severe obesity (BMI 35.0-39. 9) with comorbidity [facility]) ICD-10-CM: E66.01  ICD-9-CM: 278.01  5/3/2018        S/P laparoscopic sleeve gastrectomy ICD-10-CM: I15.46  ICD-9-CM: V45.86  8/10/2016    Overview Addendum 6/25/2019 11:18 AM by Fuad Valentin NP     4/2018. Baseline weight was 360 pounds.               H/O lumbosacral spine surgery ICD-10-CM: Z98.890  ICD-9-CM: V15.29  8/10/2016        S/P appendectomy ICD-10-CM: Z90.49  ICD-9-CM: V45.89  8/10/2016        NAFLD (nonalcoholic fatty liver disease) ICD-10-CM: K76.0  ICD-9-CM: 571.8  11/7/2013        Depression ICD-10-CM: F32.9  ICD-9-CM: 311  Unknown        Atrial fibrillation ([address] ICD-10-CM: I48.91  ICD-9-CM: 427.31  11/19/2012    Overview Signed 8/10/2016 12:06 PM by Rhodia Spurling, MD     In [place] RADHA Arango with medication             High triglycerides ICD-10-CM: E78.1  ICD-9-CM: 272.1  8/1/2012        Sleep apnea ICD-10-CM: G47.30  ICD-9-CM: 780.57  8/1/2012    Overview Signed 8/1/2012 11:21 AM by Virgilio Eagle     c-pap machine               Chronic back pain ICD-10-CM: M54.9, G89.29  ICD-9-CM: 724.5, 338.29  8/1/2012              Shashi Mcallister returns to the [address] for management of non-alcoholic fatty liver disease (NAFLD). The active problem list, all pertinent past medical history, medications, liver histology, radiologic findings and laboratory findings related to the liver disorder were reviewed with the patient. The patient is a 64 y.o.  male who was found to have fatty liver disease on liver biopsy at the time of gastric banding in 10/2013. The baseline weight was 360 pounds. This declined to 320 pounds but then returned to the baseline weight. The has undergone gastric sleeve in 04/2018 by Dr. Jaramillo President. He has lost 30 pounds since 4/2018. The patient has no symptoms which could be attributed to the liver disorder. The patient has not experienced fatigue, pain in the right side over the liver, problems concentrating, swelling of the abdomen, swelling of the lower extremities, hematemesis, hematochezia. The patient completes all daily activities without any functional limitations. Since the last office appointment the patient has:  Had no changes in the liver disease. ASSESSMENT AND PLAN:  NAFLD  The patient has had 2 liver biopsies. The first at the time at the time of gastric banding in 2013. This demonstrated SHAW with stage 3 bridging fibrosis. Th second at the time of sleeve gastrectomy in 4/2018. This demonstrated NAFLD with stage 2 fibrosis. He lost about 40 pounds or 12% of body weight but then regained back to baseline weight between 2013 and 2018. The improvement in the liver biopsy from 2013 to 2018 may reflect the weight loss he had with the gastric band. The patient has had surgical therapy for treatment of obesity. The patient lost 30 pounds since 04/2018 when he underwent gastric sleeve surgery. All features of metabolic syndrome including NAFLD should improve with weight loss.   The patient was counseled regarding diet and exercise to achieve weight loss. The best diet for patients with fatty liver is one very low in carbohydrates and enriched with protein such as an Deneice Pendleton program.     Serologic evaluation for other causes of chronic liver disease was negative. The most recent laboratory studies indicate that the liver transaminases are normal, alkaline phosphatase is normal, tests of hepatic synthetic and metabolic function are normal, and the platelet count is normal.      Recent ultrasound demonstrated heterogeneously increased echotexture diffusely. Hepatomegaly is present with estimated sagittal dimension of the right hepatic lobe measuring 18.0 cm. 10 mm right hepatic lobe focal hyperechoic focus is seen, without increased color Doppler blood flow. Lesion may have a rim of hypoechogenicity. This finding is not identified on review of prior study. Will repeat ultrasound and if there are area of concern, dynamic MRI will be ordered. AFP-L3% was ordered today as well. Shear wave elastography suggests that the liver disease is progressing and the suggested Metavir fibrosis score is now F3, bridging fibrosis. [place] Syndrome  There is a mild elevation in total bilirubin that is mostly indirect fraction consistent with [place] disease. 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 Treatment of other medical problems in patients with chronic liver disease  There are no contraindications for the patient to take any medications that are necessary for treatment of other medical issues. Counseling for alcohol in patients with chronic liver disease  The patient was counseled regarding alcohol consumption and the effect of alcohol on chronic liver disease. The patient was reminded that alcohol can cause fatty liver.   Patients who have undergone obesity surgery are at much greater risk to develop alcoholic liver injury. Vaccinations   Vaccination for viral hepatitis A is recommended since the patient has no serologic evidence of previous exposure or vaccination with immunity. Vaccination for viral hepatitis B is not needed. The patient has serologic evidence of prior exposure or vaccination with immunity. Routine vaccinations against other bacterial and viral agents can be performed as indicated. Annual flu vaccination should be administered if indicated. ALLERGIES  No Known Allergies    MEDICATIONS  Current Outpatient Medications   Medication Sig    busPIRone (BUSPAR) 10 mg tablet Take 10 mg by mouth daily.  hydrOXYzine HCl (ATARAX) 50 mg tablet     therapeutic multivitamin-minerals (VITAMINS AND MINERALS) tablet Take 1 Tab by mouth.  sildenafil citrate (VIAGRA) 50 mg tablet Take 2 Tabs by mouth.  fexofenadine (ALLEGRA) 180 mg tablet Take 180 mg by mouth.  potassium chloride (KLOR-CON M20) 20 mEq tablet TAKE 1 TABLET DAILY    cholecalciferol (VITAMIN D3) 1,000 unit cap Take  by mouth daily.  calcium citrate 200 mg (950 mg) tablet Take  by mouth daily.  multivitamin with iron (FLINTSTONES) chewable tablet Take 1 Tab by mouth daily.  ARIPiprazole (ABILIFY) 5 mg tablet Take 5 mg by mouth daily. Indications: anxiety and depression    TESTOSTERONE (FORTESTA TD) 4 Pump(s) by TransDERmal route daily. Indications: Low T    DOFETILIDE (TIKOSYN PO) Take 500 mg by mouth two (2) times a day. A-Fib    buPROPion SR (WELLBUTRIN SR) 150 mg SR tablet Take 300 mg by mouth daily. 300 XR  Indications: ANXIETY WITH DEPRESSION    temazepam (RESTORIL) 30 mg capsule Take 30 mg by mouth nightly as needed for Sleep. No current facility-administered medications for this visit. SYSTEM REVIEW NOT RELATED TO LIVER DISEASE OR REVIEWED ABOVE:  Constitution systems: Negative for fever, chills, weight gain, weight loss. Eyes: Negative for visual changes.   ENT: Negative for sore throat, painful swallowing. Respiratory: Negative for cough, hemoptysis, SOB. Cardiology: Negative for chest pain, palpitations. GI:  Negative for constipation or diarrhea. : Negative for urinary frequency, dysuria, hematuria, nocturia. Skin: Negative for rash. Hematology: Negative for easy bruising, blood clots. Musculo-skelatal: Negative for back pain, muscle pain, weakness. Neurologic: Negative for headaches, dizziness, vertigo, memory problems not related to HE. Psychology: Negative for anxiety, depression. FAMILY HISTORY:  The father  of MI. The mother is alive and healthy. There is no family history of liver disease. SOCIAL HISTORY:  The patient is . The patient has 3 children,   The patient has never used tobacco products. The patient had consumed 5-6 alcoholic beverages per day. Sicne my initial appointment with me in 2016 he now only consumes 0-2 alcoholic beverages per day. The patient used to work as a . The patient retired in . PHYSICAL EXAMINATION:  Visit Vitals  /75 (BP 1 Location: Right arm, BP Patient Position: Sitting)   Pulse (!) 54   Temp 98.7 °F (37.1 °C) (Tympanic)   Resp 20   Ht 6' 5\" (1.956 m)   Wt 330 lb (149.7 kg)   SpO2 98%   BMI 39.13 kg/m²     General: No acute distress. Eyes: Sclera anicteric. ENT: No oral lesions. Thyroid normal.  Nodes: No adenopathy. Skin: No spider angiomata. No jaundice. No palmar erythema. Respiratory: Lungs clear to auscultation. Cardiovascular: Regular heart rate. No murmurs. No JVD. Abdomen: Soft non-tender. Liver size normal to percussion/palpation. Spleen not palpable. No obvious ascites. Extremities: No edema. No muscle wasting. No gross arthritic changes. Neurologic: Alert and oriented. Cranial nerves grossly intact. No asterixis.     LABORATORY STUDIES:  [facility] [place] of [address] [place] [address] & Units 2018 2018 3/26/2018   WBC 4.6 - 13.2 K/uL 5.1  6.3   ANC 1.8 - 8.0 K/UL 2.8  3.1   HGB 13.0 - 16.0 g/dL 15.6  17.5 (H)    - 420 K/uL 152  190   INR 0.8 - 1.2        AST 15 - 37 U/L 20 27 34   ALT 16 - 61 U/L 30 46 84 (H)   Alk Phos 45 - 117 U/L 70 76 62   Bili, Total 0.2 - 1.0 MG/DL 2.2 (H) 2.4 (H) 1.8 (H)   Bili, Direct 0.0 - 0.2 MG/DL  0.4 (H)    Albumin 3.4 - 5.0 g/dL 4.2 4.3 4.1   BUN 7.0 - 18 MG/DL 16  12   Creat 0.6 - 1.3 MG/DL 1.04  0.90   Na 136 - 145 mmol/L 142  138   K 3.5 - 5.5 mmol/L 3.8  3.9   Cl 100 - 108 mmol/L 108  107   CO2 21 - 32 mmol/L 25  26   Glucose 74 - 99 mg/dL 107 (H)  74     SEROLOGIES:  Serologies Latest Ref Rng 8/10/2016   Hep A Ab, Total NEGATIVE   NEGATIVE   Hep B Surface Ag <1.00 Index <0.10   Hep B Surface Ag Interp NEG   NEGATIVE   Hep B Core Ab, Total NEGATIVE   NEGATIVE   Hep B Surface Ab >10.0 mIU/mL 223.44   Hep B Surface Ab Interp POS   POSITIVE   Hep C Ab 0.0 - 0.9 s/co ratio <0.1   Ferritin 8 - 388 NG/ML 17   Iron % Saturation % 8   C-ANCA Neg:<1:20 titer <1:20   P-ANCA Neg:<1:20 titer <1:20   ANCA Neg:<1:20 titer <1:20   ASMCA 0 - 19 Units 15   M2 Ab 0.0 - 20.0 Units 9.0   Alpha-1 antitrypsin level 90 - 200 mg/dL 140     LIVER HISTOLOGY:  10/2013. Slides reviewed by MLS. SHAW. 40% macro and micovesicular steatosis. Mild ballooning. Moderate inflammation. Bridging fibrosis. HARVINDER (221)    12/2017. Sheer wave elastography performed at [facility]. E Range: 7.31-11.27 kPa, E Mean: 8.93 kPa, E Median: 8.71 kPa, E Std: 1.5 to kPa. The results suggested a fibrosis level of F2.    4/2018. Slides reviewed by MLS. NAFLD. 20% macro and micovesicular steatosis. Mild ballooning. Mild inflammation. Stage 2 septal fibrosis. HARVINDER (111).    07/2018. TRANSIENT HEPATIC ELASTOGRAPHY:   E Range: 7.36-12.03 kPa, previously 7.31-11.27  E Mean: 9.60 kPa, previously 8.93  E Median: 9.66 kPa, previously 8.71  E Std: 1.6 kPa, previously 1.52    ENDOSCOPIC PROCEDURES:  Not available or performed    RADIOLOGY:  12/2017. Ultrasound of liver. Echogenic consistent with fatty liver. Liver mass lesion 1.5 cm which may be focal fatty sparing. No dilated bile ducts. No ascites. 1/2017. Dynamic MRI of th liver. Changes consistent with fatty liver. Liver mass consistent with focal fatty sparing. Normal spleen. No ascites. 07/2018. Ultrasound of the liver. Liver has heterogeneously increased echotexture diffusely. 10 mm right hepatic lobe focal hyperechoic focus is seen, without increased color Doppler blood flow. Lesion may have a rim of hypoechogenicity. This finding is not identified on review of prior study. OTHER TESTING:  Not available or performed    25 minutes total time spent with this patient with more than 50% of this time spent counseling and coordinating care as described above. Follow up at the [facility] in 3 months.       OMAR Martinez  Liver [place] of [address], [address] [place] [address], [address] [place] [address]   [phone]

## 2019-06-25 NOTE — PROGRESS NOTES
Johanna Weiss is a 64 y.o. male      1. Have you been to the ER, urgent care clinic or hospitalized since your last visit? NO.     2. Have you seen or consulted any other health care providers outside of the [address] [place] [address] since your last visit (Include any pap smears or colon screening)?  NO          Learning Assessment 5/16/2019   PRIMARY LEARNER Patient   HIGHEST LEVEL OF EDUCATION - PRIMARY LEARNER  -   BARRIERS PRIMARY LEARNER -   [address]    NEED -   LEARNER PREFERENCE PRIMARY DEMONSTRATION   LEARNING SPECIAL TOPICS -   ANSWERED BY patient   RELATIONSHIP SELF

## 2019-06-26 LAB
AFP L3 MFR SERPL: NORMAL % (ref 0–9.9)
AFP SERPL-MCNC: 5.8 NG/ML (ref 0–8)

## 2019-07-20 NOTE — PROGRESS NOTES
Please let him know that his labs are unchanged. Tumor marker is negative. Follow up as scheduled. Thank you.

## 2019-08-02 ENCOUNTER — HOSPITAL ENCOUNTER (OUTPATIENT)
Dept: ULTRASOUND IMAGING | Age: 59
Discharge: HOME | End: 2019-08-02
Payer: OTHER GOVERNMENT

## 2019-08-02 DIAGNOSIS — K75.81 NASH (NONALCOHOLIC STEATOHEPATITIS): ICD-10-CM

## 2019-08-02 PROCEDURE — 76981 USE PARENCHYMA: CPT

## 2019-08-10 NOTE — PROGRESS NOTES
Please let him know that his ultrasound is unremarkable. No hepatic masses. Elastography suggests a Metavir fibrosis score of F2, improved from F3. Still suggests fatty liver. Mild to moderate hepatic fibrosis. Thank you.

## 2019-11-19 ENCOUNTER — OFFICE VISIT (OUTPATIENT)
Dept: SURGERY | Age: 59
End: 2019-11-19

## 2019-11-19 VITALS
BODY MASS INDEX: 37.19 KG/M2 | HEIGHT: 77 IN | OXYGEN SATURATION: 100 % | WEIGHT: 315 LBS | DIASTOLIC BLOOD PRESSURE: 68 MMHG | TEMPERATURE: 97.6 F | HEART RATE: 76 BPM | SYSTOLIC BLOOD PRESSURE: 126 MMHG

## 2019-11-19 DIAGNOSIS — Z98.84 S/P BARIATRIC SURGERY: ICD-10-CM

## 2019-11-19 DIAGNOSIS — K90.9 INTESTINAL MALABSORPTION, UNSPECIFIED TYPE: Primary | ICD-10-CM

## 2019-11-19 NOTE — PROGRESS NOTES
Lukas Beaver presents today for   Chief Complaint   Patient presents with    Follow-up     Pt is here today for his follow up       Is someone accompanying this pt? no    Is the patient using any DME equipment during OV? no    Depression Screening:  3 most recent PHQ Screens 11/19/2019   Little interest or pleasure in doing things Not at all   Feeling down, depressed, irritable, or hopeless Not at all   Total Score PHQ 2 0       Learning Assessment:  Learning Assessment 5/16/2019   PRIMARY LEARNER Patient   HIGHEST LEVEL OF EDUCATION - PRIMARY LEARNER  -   Marilyn 22 LEARNER -   [address] [employer]    NEED -   LEARNER [id number] Shamika Thomas -   ANSWERED BY patient   RELATIONSHIP SELF       Abuse Screening:  Abuse Screening Questionnaire 11/19/2019   Do you ever feel afraid of your partner? N   Are you in a relationship with someone who physically or mentally threatens you? N   Is it safe for you to go home? Y       Fall Risk  No flowsheet data found. Coordination of Care:  1. Have you been to the ER, urgent care clinic since your last visit? Hospitalized since your last visit? no    2. Have you seen or consulted any other health care providers outside of the [address] [place] [address] since your last visit? Include any pap smears or colon screening.  no

## 2019-11-19 NOTE — PATIENT INSTRUCTIONS
Patient Instructions 1. Remember hydration goals - minimum of 64 ounces of liquids per day (dehydration is the number one reason for hospital readmission). 2. Sleep 7-9 hours each night to keep your metabolism up. 3. Continue to monitor carbohydrate and protein intake you need a minimum of  Grams of protein daily- remember to keep your total carbohydrates to 50 grams or less per day for best results. 4. To maximize weight loss keep your caloric intake between 800-1,200 calories daily. If you are exercising excessively, such as training for a marathon, you need to keep a food log and meet with the dietician so they can advise you on your diet choices, carbohydrate intake and caloric intake. 5. Continue to work towards exercise goals - 60-90 minutes, 5 times a week minimum of deliberate, aerobic exercise is the ultimate goal with strength training 2 times each week. Refer to [place] for  information. 6. Remember to take vitamins as directed in your handbook. 7. Attend support group the 2nd Thursday of each month. 8. Constipation: Milk of Magnesia is for immediate relief only. Miralax is to be used every day if constipation is a chronic problem. 9. Diarrhea: patients will occasionally develop lactose intolerance after surgery. Check to see if your protein shake has whey in it. If it does try a protein powder or drink that does not have whey and stop all yogurts, cheeses and milks to see if the diarrhea goes away. 10. If you have had labs drawn. We will only call you if you have abnormal results. Otherwise you can access the lab results in \"[facility]\". You will only need the access code the first time you sign on.    
11. Call us at [phone] or email us through [facility]\""[facility]" with questions,     concerns or worsening of condition, we have someone on call 24 hours a day. If you are unable to reach our office, you are to go to your Primary Care Physician or the Emergency Department. NOTE TO GASTRIC BYPASS PATIENTS:  (SAME APPLIES TO GASTRIC SLEEVE PATIENTS FOR FIRST TWO MONTHS) Remember that for the rest of your life, you are not able to take the following: 
- NSAIDs (ibuprofen, goody powder, BC powder, Motrin, Advil, Mobic, Voltaren, Excedrin, etc.) - Steroid pills or injections - Smoke (cigarettes or recreational drugs) - Alcohol Use of any of the above may cause ulcers in your stomach which may perforate causing a medical emergency and surgery. Speak to our medical staff if another medical provider requires you to take steroids or NSAIDs. Supplement Resource Guide Importance of Protein:  
Maintains lean body mass, produces antibodies to fight off infections, heals wounds, minimizes hair loss, helps to give you energy, helps with satiety, and keeping you full between meals. Importance of Calcium: 
Needed for healthy bones and teeth, normal blood clotting, and nervous system functioning, higher risk of osteoporosis and bone disease with non-compliance. Importance of Multivitamins: Many functions. Supply you with extra nutrients that you may be missing from food. May lead to iron deficiency anemia, weakness, fatigue, and many other symptoms with non-compliance. Importance of B Vitamins: 
Important for red blood cell formation, metabolism, energy, and helps to maintain a healthy nervous system. Protein Supplement Liquid diet phase: consume 90-100g protein daily. Once you are eating consume 35-50g protein each day from your protein supplement. 0-3 g fat per serving 0-3 g sugar per serving The body can only absorb 30g of protein at one time, so do not consume more than that at one time. Multi-vitamin Supplement:   
Start immediately after surgery: any complete chewable, such as: [place]s Complete chewables. Avoid [place] sours or gummies. They lack iron and other important nutrients and also have added sugar. Continue with a chewable vitamin or change to an adult complete multivitamin one month after surgery. Menstruating women can take a prenatal vitamin. Make sure it has at least 18 mg iron and 975-096 mcg folic acid Calcium Supplement:  
 
Start taking within one month after surgery. Look for:  
Calcium Citrate Plus D (1500 mg per day) Recommend: Citracal 
 
Avoid chocolate chewable calcium. Can use chewable bariatric or [employer] brand or similar chewable. The body cannot absorb more than 500-600 mg of calcium at one time. Take for Life Vitamin D Take 3,000 international units daily Vitamin B12 B Complex Vitamin Start taking both within one month after surgery. Vitamin B12 (sublingual): Take 1000 mcg of Vitamin B12 three times weekly Must take sublingually (meaning you put it under your tongue) or in a liquid drop form for easy absorption. B Complex Vitamin:  
Take one pill daily or liquid drop form daily; as directed on bottle. Take for Life Low carb bread options: 
647 bread sold on the shelf [employer] and [employer] in the freezer section Replacements for high carb crunchy foods: 
Pork Rinkeysha [employer] ([facility],  [address] [place], Recommendi) Delilah Pérez, [address] [place] [address], [employer]) Ania Ghotra [facility])

## 2019-11-19 NOTE — PROGRESS NOTES
Subjective:      Emily Almazan is a 64 y.o. male is now 18 months status post laparoscopic conversion of lap band to sleeve gastrectomy. Doing well overall. He has lost a total of 26 pounds since surgery. Body mass index is 38.74 kg/m². Has lost 15% of EBW. Currently on a solid food diet without difficulty, reports no issues and denies vomiting and abdominal pain. Taking in 64+oz water daily. Sources of protein include shrimp, beef, chicken, eggs, cheese. He has been eating popcorn, potatoes, pasta. His days are too full to exercise. Patient is sleeping 9 hours a night on average, but he wakes up several times during the night. Patient is the primary caregiver for his wife. He also has 11 other people living under his roof and states that he has absolutely no peace in his life. Bowel movements are regular. The patient is not having any pain. . The patient is compliant with multivitamins, calcium, Vit D and B12 supplements.      Weight Loss Metrics 11/19/2019 6/25/2019 5/16/2019 2/14/2019 11/14/2018 8/9/2018 6/25/2018   Pre op / Initial Wt - - - - - - -   Today's Wt 326 lb 11.2 oz 330 lb 323 lb 6.4 oz 320 lb 12.8 oz 313 lb 1.6 oz 306 lb 4.8 oz 309 lb   BMI 38.74 kg/m2 39.13 kg/m2 38.35 kg/m2 38.04 kg/m2 37.13 kg/m2 36.32 kg/m2 36.64 kg/m2   Ideal Body Wt - - - - - - -          Comorbidities:    Hypertension: not applicable  Diabetes: resolved  Obstructive Sleep Apnea: unchanged, is using CPAP, still says he wakes up about 4 times a night staring a clock, thinks its related to stress  Hyperlipidemia: not applicable  Stress Urinary Incontinence: not applicable  Gastroesophageal Reflux: not applicable  Weight related arthropathy:not applicable    Patient Active Problem List   Diagnosis Code    High triglycerides E78.1    Sleep apnea G47.30    Chronic back pain M54.9, G89.29    Atrial fibrillation (HCC) I48.91    Depression F32.9    NAFLD (nonalcoholic fatty liver disease) K76.0    S/P laparoscopic sleeve gastrectomy Z98.84    H/O lumbosacral spine surgery Z98.890    S/P appendectomy Z90.49    Severe obesity (BMI 35.0-39. 9) with comorbidity ([facility] [address] E66.01        Past Medical History:   Diagnosis Date    Anxiety     Depression     Essential hypertension, benign 8/1/2012    High triglycerides 8/1/2012    History of atrial fibrillation     cardioverted / no recurrance    Hypercholesterolemia     PT denies this at triage 11/19/12    Hypertension 2012    No meds    Morbid obesity ([facility] [address]     Morbid obesity with BMI of 40.0-44.9, adult (HCC)     Nausea & vomiting 1990    Pre-diabetes     Sleep apnea     instructed to bring CPAP day of Surgery; rate 12       Past Surgical History:   Procedure Laterality Date    CARDIAC CATHETERIZATION  1/2013    HX APPENDECTOMY  1990    HX GASTRIC BYPASS      sleve    HX HEENT      UPPP    HX LUMBAR DISKECTOMY      HX SHOULDER ARTHROSCOPY Left 2017    LAP, PLACE ADJUST GASTR BAND      removed 2017    ME LAP, BENITEZ RESTRICT PROC, LONGITUDINAL GASTRECTOMY      4/5/2018       Current Outpatient Medications   Medication Sig Dispense Refill    busPIRone (BUSPAR) 10 mg tablet Take 10 mg by mouth daily.  hydrOXYzine HCl (ATARAX) 50 mg tablet       therapeutic multivitamin-minerals (VITAMINS AND MINERALS) tablet Take 1 Tab by mouth.  sildenafil citrate (VIAGRA) 50 mg tablet Take 2 Tabs by mouth.  fexofenadine (ALLEGRA) 180 mg tablet Take 180 mg by mouth.  potassium chloride (KLOR-CON M20) 20 mEq tablet TAKE 1 TABLET DAILY      cholecalciferol (VITAMIN D3) 1,000 unit cap Take  by mouth daily.  calcium citrate 200 mg (950 mg) tablet Take  by mouth daily.  multivitamin with iron (FLINTSTONES) chewable tablet Take 1 Tab by mouth daily.  ARIPiprazole (ABILIFY) 5 mg tablet Take 5 mg by mouth daily. Indications: anxiety and depression      temazepam (RESTORIL) 30 mg capsule Take 30 mg by mouth nightly as needed for Sleep.       TESTOSTERONE (FORTESTA TD) 4 Pump(s) by TransDERmal route daily. Indications: Low T      DOFETILIDE (TIKOSYN PO) Take 500 mg by mouth two (2) times a day. A-Fib      buPROPion SR (WELLBUTRIN SR) 150 mg SR tablet Take 300 mg by mouth daily. 300 XR  Indications: ANXIETY WITH DEPRESSION         No Known Allergies    Review of Systems:  General - No history or complaints of unexpected fever or chills  Head/Neck - No history or complaints of headache or dizziness  Cardiac - No history or complaints of chest pain, palpitations, or shortness of breath  Pulmonary - No history or complaints of shortness of breath or productive cough  Gastrointestinal - as noted above  Genitourinary - No history or complaints of hematuria/dysuria or renal lithiasis  Musculoskeletal - No history or complaints of joint  muscular weakness  Hematologic - No history of any bleeding episodes  Neurologic - No history or complaints of  migraine headaches or neurologic symptoms    Objective:     Visit Vitals  /68 (BP 1 Location: Left arm, BP Patient Position: Sitting)   Pulse 76   Temp 97.6 °F (36.4 °C)   Ht 6' 5\" (1.956 m)   Wt 148.2 kg (326 lb 11.2 oz)   SpO2 100%   BMI 38.74 kg/m²       General:  alert, cooperative, no distress, appears stated age   Chest: lungs clear to auscultation, breath sounds equal and symmetric, no rhonchi, rales or wheezes, no accessory muscle use   Cor:   Regular rate and rhythm or without murmur or extra heart sounds   Abdomen: soft, bowel sounds active, non-tender, no masses or organomegaly   Incisions:   healed well       Assessment:   History of Morbid obesity, status post laparoscopic conversion of lap band to sleeve gastrectomy. Doing well postoperatively. Exercise a minimum of 30 minutes daily. Strict diet control, patient is to keep carbohydrate consumption <50g daily for additional weight loss. Reminded that dietitian is a free resource for him.   A fib - Continue meds and follow up with Cardiology  MAGDA - continue CPAP  Hyperlipidemia - Continue meds and follow up with PCP  Anxiety - Continue meds and follow up with PCP        Plan:     1. Increase activity to the goal of 30 minutes daily  2. Discussed patients weight loss goals and dietary choices in relation to goals. 3. Sleep goal is 7-9 hours each night. Patient education given on the effects of sleep deprivation on weight control. 4. Reminded to measure portions, continue high protein, low carbohydrate diet. Reminded to eat regularly, to eat slowly & not to drink with meals. 5. Continue vitamin supplementation  6. Continue current medications and follow up with PCP for management of regimen. 7. Continue cardio exercise and add resistance exercises. 60-90 minutes of aerobic activity 5 days a week and strength training 2 days each week. 8. Encouraged to attend support group   9. I have discussed this plan with patient and they verbalized understanding  10. Follow up in 6 months or sooner if patient has questions, concerns or worsening of condition, if unable to reach our office, patient should report to the ED. 6. Mr. Nikita Gonzalez has a reminder for a \"due or due soon\" health maintenance. I have asked that he contact his primary care provider for a follow-up on this health maintenance.

## 2019-12-18 ENCOUNTER — OFFICE VISIT (OUTPATIENT)
Dept: HEMATOLOGY | Age: 59
End: 2019-12-18

## 2019-12-18 ENCOUNTER — HOSPITAL ENCOUNTER (OUTPATIENT)
Dept: LAB | Age: 59
Discharge: HOME | End: 2019-12-18
Payer: OTHER GOVERNMENT

## 2019-12-18 VITALS
OXYGEN SATURATION: 99 % | HEART RATE: 52 BPM | BODY MASS INDEX: 37.19 KG/M2 | SYSTOLIC BLOOD PRESSURE: 133 MMHG | DIASTOLIC BLOOD PRESSURE: 68 MMHG | WEIGHT: 315 LBS | TEMPERATURE: 98.1 F | HEIGHT: 77 IN

## 2019-12-18 DIAGNOSIS — K76.0 NAFLD (NONALCOHOLIC FATTY LIVER DISEASE): ICD-10-CM

## 2019-12-18 DIAGNOSIS — K76.0 NAFLD (NONALCOHOLIC FATTY LIVER DISEASE): Primary | ICD-10-CM

## 2019-12-18 PROBLEM — E83.119 HEMOCHROMATOSIS: Status: ACTIVE | Noted: 2019-12-18

## 2019-12-18 LAB
ALBUMIN SERPL-MCNC: 4.1 G/DL (ref 3.4–5)
ALBUMIN/GLOB SERPL: 1.3 {RATIO} (ref 0.8–1.7)
ALP SERPL-CCNC: 66 U/L (ref 45–117)
ALT SERPL-CCNC: 62 U/L (ref 16–61)
ANION GAP SERPL CALC-SCNC: 5 MMOL/L (ref 3–18)
AST SERPL-CCNC: 32 U/L (ref 10–38)
BASOPHILS # BLD: 0 K/UL (ref 0–0.1)
BASOPHILS NFR BLD: 1 % (ref 0–2)
BILIRUB DIRECT SERPL-MCNC: 0.4 MG/DL (ref 0–0.2)
BILIRUB SERPL-MCNC: 2 MG/DL (ref 0.2–1)
BUN SERPL-MCNC: 14 MG/DL (ref 7–18)
BUN/CREAT SERPL: 14 (ref 12–20)
CALCIUM SERPL-MCNC: 9.3 MG/DL (ref 8.5–10.1)
CHLORIDE SERPL-SCNC: 107 MMOL/L (ref 100–111)
CO2 SERPL-SCNC: 30 MMOL/L (ref 21–32)
CREAT SERPL-MCNC: 0.97 MG/DL (ref 0.6–1.3)
DIFFERENTIAL METHOD BLD: ABNORMAL
EOSINOPHIL # BLD: 0.1 K/UL (ref 0–0.4)
EOSINOPHIL NFR BLD: 2 % (ref 0–5)
ERYTHROCYTE [DISTWIDTH] IN BLOOD BY AUTOMATED COUNT: 14 % (ref 11.6–14.5)
FERRITIN SERPL-MCNC: 86 NG/ML (ref 8–388)
GLOBULIN SER CALC-MCNC: 3.1 G/DL (ref 2–4)
GLUCOSE SERPL-MCNC: 62 MG/DL (ref 74–99)
HCT VFR BLD AUTO: 48.8 % (ref 36–48)
HGB BLD-MCNC: 16.2 G/DL (ref 13–16)
IRON SATN MFR SERPL: 51 %
IRON SERPL-MCNC: 185 UG/DL (ref 50–175)
LYMPHOCYTES # BLD: 1.5 K/UL (ref 0.9–3.6)
LYMPHOCYTES NFR BLD: 32 % (ref 21–52)
MCH RBC QN AUTO: 32.3 PG (ref 24–34)
MCHC RBC AUTO-ENTMCNC: 33.2 G/DL (ref 31–37)
MCV RBC AUTO: 97.4 FL (ref 74–97)
MONOCYTES # BLD: 0.7 K/UL (ref 0.05–1.2)
MONOCYTES NFR BLD: 15 % (ref 3–10)
NEUTS SEG # BLD: 2.3 K/UL (ref 1.8–8)
NEUTS SEG NFR BLD: 50 % (ref 40–73)
PLATELET # BLD AUTO: 155 K/UL (ref 135–420)
PMV BLD AUTO: 11.2 FL (ref 9.2–11.8)
POTASSIUM SERPL-SCNC: 4.1 MMOL/L (ref 3.5–5.5)
PROT SERPL-MCNC: 7.2 G/DL (ref 6.4–8.2)
RBC # BLD AUTO: 5.01 M/UL (ref 4.7–5.5)
SODIUM SERPL-SCNC: 142 MMOL/L (ref 136–145)
TIBC SERPL-MCNC: 363 UG/DL (ref 250–450)
WBC # BLD AUTO: 4.6 K/UL (ref 4.6–13.2)

## 2019-12-18 PROCEDURE — 80076 HEPATIC FUNCTION PANEL: CPT

## 2019-12-18 PROCEDURE — 82728 ASSAY OF FERRITIN: CPT

## 2019-12-18 PROCEDURE — 85025 COMPLETE CBC W/AUTO DIFF WBC: CPT

## 2019-12-18 PROCEDURE — 82107 ALPHA-FETOPROTEIN L3: CPT

## 2019-12-18 PROCEDURE — 83540 ASSAY OF IRON: CPT

## 2019-12-18 PROCEDURE — 80048 BASIC METABOLIC PNL TOTAL CA: CPT

## 2019-12-18 PROCEDURE — 81256 HFE GENE: CPT

## 2019-12-18 PROCEDURE — 36415 COLL VENOUS BLD VENIPUNCTURE: CPT

## 2019-12-19 LAB
AFP L3 MFR SERPL: NORMAL % (ref 0–9.9)
AFP SERPL-MCNC: 4.7 NG/ML (ref 0–8)

## 2019-12-20 NOTE — PROGRESS NOTES
[address] [place], [place] [address]MD [place], Andrea Delarosa, MD Kevin Watkins, PA-RADHA Rashid, ACNP-BC     April S Scottie, [facility]-BC   KAMI Abad NP [address] [facility] [address]    at [address], [address] [facility], [facility] 22.    [phone]    FAX: [address] [place] [address], [address], [address]    [phone]    FAX: [phone]         Patient Care Team:  Evan Hawk MD as PCP - General (Internal Medicine)  Ramu Urrutia MD (General Surgery)  Slim Martinez MD (Internal Medicine)      Problem List  Date Reviewed: 11/19/2019          Codes Class Noted    Hemochromatosis ICD-10-CM: E83.119  ICD-9-CM: 275.03  12/18/2019        Severe obesity (BMI 35.0-39. 9) with comorbidity [facility]) ICD-10-CM: E66.01  ICD-9-CM: 278.01  5/3/2018        S/P laparoscopic sleeve gastrectomy ICD-10-CM: U17.10  ICD-9-CM: V45.86  8/10/2016    Overview Addendum 6/25/2019 11:18 AM by Lavelle aNils NP     4/2018. Baseline weight was 360 pounds.               H/O lumbosacral spine surgery ICD-10-CM: Z98.890  ICD-9-CM: V15.29  8/10/2016        S/P appendectomy ICD-10-CM: Z90.49  ICD-9-CM: V45.89  8/10/2016        NAFLD (nonalcoholic fatty liver disease) ICD-10-CM: K76.0  ICD-9-CM: 571.8  11/7/2013        Depression ICD-10-CM: F32.9  ICD-9-CM: 311  Unknown        Atrial fibrillation ([address] ICD-10-CM: I48.91  ICD-9-CM: 427.31  11/19/2012    Overview Signed 8/10/2016 12:06 PM by Luis Rodríguez MD     In [place] RADHA Arango with medication             High triglycerides ICD-10-CM: E78.1  ICD-9-CM: 272.1  8/1/2012        Sleep apnea ICD-10-CM: G47.30  ICD-9-CM: 780.57  8/1/2012    Overview Signed 8/1/2012 11:21 AM by Rosalva Muniz K     c-pap machine               Chronic back pain ICD-10-CM: M54.9, G89.29  ICD-9-CM: 724.5, 338.29  8/1/2012              Tiffani Mccann returns to the [place] [address] for management of non-alcoholic fatty liver disease (NAFLD). The active problem list, all pertinent past medical history, medications, liver histology, radiologic findings and laboratory findings related to the liver disorder were reviewed with the patient. The patient is a 64 y.o.  male who was found to have fatty liver disease on liver biopsy at the time of gastric banding in 10/2013. The baseline weight was 360 pounds. This declined to 320 pounds but then returned to the baseline weight. He weighs 330 today. The has undergone gastric sleeve in 04/2018 by Dr. Jeovany Duarte. The patient has no symptoms which could be attributed to the liver disorder. The patient completes all daily activities without any functional limitations. The patient has not experienced fatigue, fevers, chills, shortness of breath, chest pain, pain in the right side over the liver, diffuse abdominal pain, nausea, vomiting, constipation, diarrhrea, dry eyes, dry mouth, arthralgias, myalgias, yellowing of the eyes or skin, itching, dark urine, problems concentrating, swelling of the abdomen, swelling of the lower extremities, hematemesis, or hematochezia. Since the last office appointment the patient has:  Had no changes in the liver disease. ASSESSMENT AND PLAN:  NAFLD  The patient has had 2 liver biopsies. The first at the time at the time of gastric banding in 2013. This demonstrated SHAW with stage 3 bridging fibrosis. Th second at the time of sleeve gastrectomy in 4/2018. This demonstrated NAFLD with stage 2 fibrosis. He lost about 40 pounds or 12% of body weight but then regained back to baseline weight between 2013 and 2018.   The improvement in the liver biopsy from 2013 to 2018 may reflect the weight loss he had with the gastric band. The patient has had surgical therapy for treatment of obesity. The patient lost 30 pounds since 04/2018 when he underwent gastric sleeve surgery. All features of metabolic syndrome including NAFLD should improve with weight loss. The patient was counseled regarding diet and exercise to achieve weight loss. The best diet for patients with fatty liver is one very low in carbohydrates and enriched with protein such as an [address] [place] program.   The patient was told not to consume any food products and drinks containing fructose as this enhances hepatic fat synthesis. Serologic evaluation for other causes of chronic liver disease was negative. The most recent laboratory studies indicate that the liver transaminases are normal, alkaline phosphatase is normal, tests of hepatic synthetic and metabolic function are normal, and the platelet count is normal.      Recent ultrasound demonstrated heterogeneously increased echotexture diffusely. Hepatomegaly is present with estimated sagittal dimension of the right hepatic lobe measuring 18.0 cm. 10 mm right hepatic lobe focal hyperechoic focus is seen, without increased color Doppler blood flow. Lesion may have a rim of hypoechogenicity. This finding is not identified on review of prior study. Will repeat ultrasound and if there are area of concern, dynamic MRI will be ordered. AFP-L3% was ordered today as well. Shear wave elastography suggests that the liver disease is progressing and the suggested Metavir fibrosis score is now F3, bridging fibrosis. [place] Syndrome  There is a mild elevation in total bilirubin that is mostly indirect fraction consistent with [place] disease. 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     Treatment of other medical problems in patients with chronic liver disease  There are no contraindications for the patient to take any medications that are necessary for treatment of other medical issues. Counseling for alcohol in patients with chronic liver disease  The patient was counseled regarding alcohol consumption and the effect of alcohol on chronic liver disease. The patient was reminded that alcohol can cause fatty liver. Patients who have undergone obesity surgery are at much greater risk to develop alcoholic liver injury. Vaccinations   Vaccination for viral hepatitis A is recommended since the patient has no serologic evidence of previous exposure or vaccination with immunity. Vaccination for viral hepatitis B is not needed. The patient has serologic evidence of prior exposure or vaccination with immunity. Routine vaccinations against other bacterial and viral agents can be performed as indicated. Annual flu vaccination should be administered if indicated. ALLERGIES  No Known Allergies    MEDICATIONS  Current Outpatient Medications   Medication Sig    busPIRone (BUSPAR) 10 mg tablet Take 10 mg by mouth daily.  hydrOXYzine HCl (ATARAX) 50 mg tablet     therapeutic multivitamin-minerals (VITAMINS AND MINERALS) tablet Take 1 Tab by mouth.  fexofenadine (ALLEGRA) 180 mg tablet Take 180 mg by mouth.  potassium chloride (KLOR-CON M20) 20 mEq tablet TAKE 1 TABLET DAILY    cholecalciferol (VITAMIN D3) 1,000 unit cap Take  by mouth daily.  calcium citrate 200 mg (950 mg) tablet Take  by mouth daily.  ARIPiprazole (ABILIFY) 5 mg tablet Take 5 mg by mouth daily. Indications: anxiety and depression    TESTOSTERONE (FORTESTA TD) 4 Pump(s) by TransDERmal route daily. Indications: Low T    DOFETILIDE (TIKOSYN PO) Take 500 mg by mouth two (2) times a day. A-Fib    buPROPion SR (WELLBUTRIN SR) 150 mg SR tablet Take 300 mg by mouth daily. 300 XR  Indications: ANXIETY WITH DEPRESSION    sildenafil citrate (VIAGRA) 50 mg tablet Take 2 Tabs by mouth.  temazepam (RESTORIL) 30 mg capsule Take 30 mg by mouth nightly as needed for Sleep. No current facility-administered medications for this visit. SYSTEM REVIEW NOT RELATED TO LIVER DISEASE OR REVIEWED ABOVE:  Constitution systems: Negative for fever, chills, weight gain, weight loss. Eyes: Negative for visual changes. ENT: Negative for sore throat, painful swallowing. Respiratory: Negative for cough, hemoptysis, SOB. Cardiology: Negative for chest pain, palpitations. GI:  Negative for constipation or diarrhea. : Negative for urinary frequency, dysuria, hematuria, nocturia. Skin: Negative for rash. Hematology: Negative for easy bruising, blood clots. Musculo-skelatal: Negative for back pain, muscle pain, weakness. Neurologic: Negative for headaches, dizziness, vertigo, memory problems not related to HE. Psychology: Negative for anxiety, depression. FAMILY HISTORY:  The father  of MI. The mother is alive and healthy. There is no family history of liver disease. SOCIAL HISTORY:  The patient is . The patient has 3 children,   The patient has never used tobacco products. The patient had consumed 5-6 alcoholic beverages per day. Sicne my initial appointment with me in 2016 he now only consumes 0-2 alcoholic beverages per day. The patient used to work as a . The patient retired in . PHYSICAL EXAMINATION:  Visit Vitals  /68 (BP 1 Location: Left arm, BP Patient Position: Sitting)   Pulse (!) 52   Temp 98.1 °F (36.7 °C)   Ht 6' 5\" (1.956 m)   Wt 330 lb (149.7 kg)   SpO2 99%   BMI 39.13 kg/m²     General: No acute distress. Eyes: Sclera anicteric. ENT: No oral lesions. Thyroid normal.  Nodes: No adenopathy. Skin: No spider angiomata. No jaundice. No palmar erythema. Respiratory: Lungs clear to auscultation. Cardiovascular: Regular heart rate. No murmurs. No JVD. Abdomen: Soft non-tender. Liver size normal to percussion/palpation. Spleen not palpable. No obvious ascites. Extremities: No edema.   No muscle wasting. No gross arthritic changes. Neurologic: Alert and oriented. Cranial nerves grossly intact. No asterixis. LABORATORY STUDIES:  Liver [place] [address] Units 8/9/2018 6/25/2018 3/26/2018   WBC 4.6 - 13.2 K/uL 5.1  6.3   ANC 1.8 - 8.0 K/UL 2.8  3.1   HGB 13.0 - 16.0 g/dL 15.6  17.5 (H)    - 420 K/uL 152  190   INR 0.8 - 1.2        AST 15 - 37 U/L 20 27 34   ALT 16 - 61 U/L 30 46 84 (H)   Alk Phos 45 - 117 U/L 70 76 62   Bili, Total 0.2 - 1.0 MG/DL 2.2 (H) 2.4 (H) 1.8 (H)   Bili, Direct 0.0 - 0.2 MG/DL  0.4 (H)    Albumin 3.4 - 5.0 g/dL 4.2 4.3 4.1   BUN 7.0 - 18 MG/DL 16  12   Creat 0.6 - 1.3 MG/DL 1.04  0.90   Na 136 - 145 mmol/L 142  138   K 3.5 - 5.5 mmol/L 3.8  3.9   Cl 100 - 108 mmol/L 108  107   CO2 21 - 32 mmol/L 25  26   Glucose 74 - 99 mg/dL 107 (H)  74     SEROLOGIES:  Serologies Latest Ref Rng 8/10/2016   Hep A Ab, Total NEGATIVE   NEGATIVE   Hep B Surface Ag <1.00 Index <0.10   Hep B Surface Ag Interp NEG   NEGATIVE   Hep B Core Ab, Total NEGATIVE   NEGATIVE   Hep B Surface Ab >10.0 mIU/mL 223.44   Hep B Surface Ab Interp POS   POSITIVE   Hep C Ab 0.0 - 0.9 s/co ratio <0.1   Ferritin 8 - 388 NG/ML 17   Iron % Saturation % 8   C-ANCA Neg:<1:20 titer <1:20   P-ANCA Neg:<1:20 titer <1:20   ANCA Neg:<1:20 titer <1:20   ASMCA 0 - 19 Units 15   M2 Ab 0.0 - 20.0 Units 9.0   Alpha-1 antitrypsin level 90 - 200 mg/dL 140     LIVER HISTOLOGY:  10/2013. Slides reviewed by MLS. SHAW. 40% macro and micovesicular steatosis. Mild ballooning. Moderate inflammation. Bridging fibrosis. HARVINDER (221)    12/2017. Sheer wave elastography performed at THE [facility]. E Range: 7.31-11.27 kPa, E Mean: 8.93 kPa, E Median: 8.71 kPa, E Std: 1.5 to kPa. The results suggested a fibrosis level of F2.    4/2018. Slides reviewed by MLS. NAFLD. 20% macro and micovesicular steatosis. Mild ballooning. Mild inflammation. Stage 2 septal fibrosis. HARVINDER (111).    07/2018.   TRANSIENT HEPATIC ELASTOGRAPHY:   E Range: 7.36-12.03 kPa, previously 7.31-11.27  E Mean: 9.60 kPa, previously 8.93  E Median: 9.66 kPa, previously 8.71  E Std: 1.6 kPa, previously 1.52    08/2019. Elastography of the liver:   E range: 8.62-13.21 (previously 7.36-12.03) kPa  E mean: 10.38 (9.16) kPa  E median: 9.23 (9.66) kPa  E standard: 1.89 (1.6) kPa    ENDOSCOPIC PROCEDURES:  Not available or performed    RADIOLOGY:  12/2017. Ultrasound of liver. Echogenic consistent with fatty liver. Liver mass lesion 1.5 cm which may be focal fatty sparing. No dilated bile ducts. No ascites. 1/2017. Dynamic MRI of th liver. Changes consistent with fatty liver. Liver mass consistent with focal fatty sparing. Normal spleen. No ascites. 07/2018. Ultrasound of the liver. Liver has heterogeneously increased echotexture diffusely. 10 mm right hepatic lobe focal hyperechoic focus is seen, without increased color Doppler blood flow. Lesion may have a rim of hypoechogenicity. This finding is not identified on review of prior study. 08/2019. Ultrasound of the liver. Hepatomegaly, subjectively slightly more prominent with increased echotexture and sparing around the gallbladder fossa with hepatic steatosis. Previous 10 mm hyperechoic lesion in the left lobe was not redemonstrated. OTHER TESTING:  Not available or performed    25 minutes total time spent with this patient with more than 50% of this time spent counseling and coordinating care as described above. Follow up at the [facility] in 6 months.       NAS Rios-RADHA  Liver [place] [facility]  [address], [address] [place] [address]   [phone]

## 2019-12-23 LAB — HFE GENE MUT ANL BLD/T: NORMAL

## 2020-05-11 ENCOUNTER — VIRTUAL VISIT (OUTPATIENT)
Dept: SURGERY | Age: 60
End: 2020-05-11

## 2020-05-11 VITALS — BODY MASS INDEX: 37.19 KG/M2 | WEIGHT: 315 LBS | HEIGHT: 77 IN

## 2020-05-11 DIAGNOSIS — E66.01 SEVERE OBESITY (BMI 35.0-39.9) WITH COMORBIDITY (HCC): ICD-10-CM

## 2020-05-11 DIAGNOSIS — G47.30 SLEEP APNEA, UNSPECIFIED TYPE: ICD-10-CM

## 2020-05-11 DIAGNOSIS — K90.9 INTESTINAL MALABSORPTION, UNSPECIFIED TYPE: Primary | ICD-10-CM

## 2020-05-11 DIAGNOSIS — I48.91 ATRIAL FIBRILLATION, UNSPECIFIED TYPE (HCC): ICD-10-CM

## 2020-05-11 DIAGNOSIS — Z98.84 S/P LAPAROSCOPIC SLEEVE GASTRECTOMY: ICD-10-CM

## 2020-05-11 NOTE — PROGRESS NOTES
Subjective:     Jesus Mg  is a 64 y.o. male who presents for follow-up about 2 years following laparoscopic sleeve gastrectomy. He has lost a total of 26  pounds since surgery. Body mass index is 39.13 kg/m². . EBWL is (16%). The patient presents today to assess their progress toward their goal of weight loss and to address any issues that may be present. Today the patient and I have reviewed their diet and how appropriate their food choices are. The following issues have been identified : The patient continues to have issues with his diet due to the fact that he has a son who required an amputation last year due to a motorcycle accident and his family moved in with him. They have to feed 9 people a day and this necessitates them cooking with carbohydrates on a fairly constant basis. Activity level has also decreased remarkably due to the coronavirus epidemic. He is eager to lose more weight and would at least like to get under 300 pounds over the next year. Has not talked to our dietitian in quite some time nor has he had a thyroid profile drawn. He does use testosterone gel for a low testosterone level    Weight Loss Metrics 5/11/2020 12/18/2019 11/19/2019 6/25/2019 5/16/2019 2/14/2019 11/14/2018   Pre op / Initial Wt - - - - - - -   Today's Wt 330 lb 330 lb 326 lb 11.2 oz 330 lb 323 lb 6.4 oz 320 lb 12.8 oz 313 lb 1.6 oz   BMI 39.13 kg/m2 39.13 kg/m2 38.74 kg/m2 39.13 kg/m2 38.35 kg/m2 38.04 kg/m2 37.13 kg/m2   Ideal Body Wt - - - - - - -     . Surgery related complication: none       He reports as above and denies vomiting and abdominal pain.            Patients pain score:0    Weight Loss Metrics 5/11/2020 12/18/2019 11/19/2019 6/25/2019 5/16/2019 2/14/2019 11/14/2018   Pre op / Initial Wt - - - - - - -   Today's Wt 330 lb 330 lb 326 lb 11.2 oz 330 lb 323 lb 6.4 oz 320 lb 12.8 oz 313 lb 1.6 oz   BMI 39.13 kg/m2 39.13 kg/m2 38.74 kg/m2 39.13 kg/m2 38.35 kg/m2 38.04 kg/m2 37.13 kg/m2   Ideal Body Wt - - - - - - -        The patient's exercise level: not active. Changes in his medical history and medications have been reviewed. Patient Active Problem List   Diagnosis Code    High triglycerides E78.1    Sleep apnea G47.30    Chronic back pain M54.9, G89.29    Atrial fibrillation (HCC) I48.91    Depression F32.9    NAFLD (nonalcoholic fatty liver disease) K76.0    S/P laparoscopic sleeve gastrectomy Z98.84    H/O lumbosacral spine surgery Z98.890    S/P appendectomy Z90.49    Severe obesity (BMI 35.0-39. 9) with comorbidity ([address] E66.01    Hemochromatosis E83.119     Past Medical History:   Diagnosis Date    Anxiety     Depression     Essential hypertension, benign 8/1/2012    High triglycerides 8/1/2012    History of atrial fibrillation     cardioverted / no recurrance    Hypercholesterolemia     PT denies this at triage 11/19/12    Hypertension 2012    No meds    Morbid obesity ([facility] [address]     Morbid obesity with BMI of 40.0-44.9, adult (HCC)     Nausea & vomiting 1990    Pre-diabetes     Sleep apnea     instructed to bring CPAP day of Surgery; rate 12     Past Surgical History:   Procedure Laterality Date    CARDIAC CATHETERIZATION  1/2013    HX APPENDECTOMY  1990    HX GASTRIC BYPASS      sleve    HX HEENT      UPPP    HX LUMBAR DISKECTOMY      HX SHOULDER ARTHROSCOPY Left 2017    LAP, PLACE ADJUST GASTR BAND      removed 2017    OR LAP, BENITEZ RESTRICT PROC, LONGITUDINAL GASTRECTOMY      4/5/2018     Current Outpatient Medications   Medication Sig Dispense Refill    busPIRone (BUSPAR) 10 mg tablet Take 10 mg by mouth daily.  therapeutic multivitamin-minerals (VITAMINS AND MINERALS) tablet Take 1 Tab by mouth.  sildenafil citrate (VIAGRA) 50 mg tablet Take 2 Tabs by mouth.  fexofenadine (ALLEGRA) 180 mg tablet Take 180 mg by mouth.       potassium chloride (KLOR-CON M20) 20 mEq tablet TAKE 1 TABLET DAILY      cholecalciferol (VITAMIN D3) 1,000 unit cap Take  by mouth daily.  calcium citrate 200 mg (950 mg) tablet Take  by mouth daily.  ARIPiprazole (ABILIFY) 5 mg tablet Take 5 mg by mouth daily. Indications: anxiety and depression      temazepam (RESTORIL) 30 mg capsule Take 30 mg by mouth nightly as needed for Sleep.  TESTOSTERONE (FORTESTA TD) 4 Pump(s) by TransDERmal route daily. Indications: Low T      DOFETILIDE (TIKOSYN PO) Take 500 mg by mouth two (2) times a day. A-Fib      buPROPion SR (WELLBUTRIN SR) 150 mg SR tablet Take 300 mg by mouth daily.  300 XR  Indications: ANXIETY WITH DEPRESSION      hydrOXYzine HCl (ATARAX) 50 mg tablet           Review of Symptoms:       General - No history or complaints of unexpected fever or chills  Head/Neck - No history or complaints of headache or dizziness  Cardiac - No history or complaints of chest pain, palpitations, or shortness of breath  Pulmonary - No history or complaints of shortness of breath or productive cough  Gastrointestinal - as noted above  Genitourinary - No history or complaints of hematuria/dysuria or renal lithiasis  Musculoskeletal - No history or complaints of joint  muscular weakness  Hematologic - No history of any bleeding episodes  Neurologic - No history or complaints of  migraine headaches or neurologic symptoms                     Objective:     Visit Vitals  Ht 6' 5\" (1.956 m)   Wt 149.7 kg (330 lb)   BMI 39.13 kg/m²        Physical Exam:       Physical Examination: General appearance - alert, overweight appearing, and in no distress and oriented to person, place, and time  Mental status - alert, oriented to person, place, and time, normal mood, behavior, speech, dress, motor activity, and thought processes  Eyes - pupils equal and reactive, extraocular eye movements intact, sclera anicteric, left eye normal, right eye normal  Ears - right ear normal, left ear normal  Neck- good extension and flexion, no obvious swelling  Chest - good air movement  Heart - N/A  Abdomen - no obvious distension  Neurological - alert, oriented, normal speech, no focal findings or movement disorder noted  Musculoskeletal - no swelling noted  Extremities - normal movement      Lab Results   Component Value Date/Time    WBC 4.6 12/18/2019 10:39 AM    HGB 16.2 (H) 12/18/2019 10:39 AM    HCT 48.8 (H) 12/18/2019 10:39 AM    PLATELET 284 71/50/8793 10:39 AM    MCV 97.4 (H) 12/18/2019 10:39 AM     Lab Results   Component Value Date/Time    Sodium 142 12/18/2019 10:39 AM    Potassium 4.1 12/18/2019 10:39 AM    Chloride 107 12/18/2019 10:39 AM    CO2 30 12/18/2019 10:39 AM    Anion gap 5 12/18/2019 10:39 AM    Glucose 62 (L) 12/18/2019 10:39 AM    BUN 14 12/18/2019 10:39 AM    Creatinine 0.97 12/18/2019 10:39 AM    BUN/Creatinine ratio 14 12/18/2019 10:39 AM    GFR est AA >60 12/18/2019 10:39 AM    GFR est non-AA >60 12/18/2019 10:39 AM    Calcium 9.3 12/18/2019 10:39 AM    Bilirubin, total 2.0 (H) 12/18/2019 10:39 AM    AST (SGOT) 32 12/18/2019 10:39 AM    Alk. phosphatase 66 12/18/2019 10:39 AM    Protein, total 7.2 12/18/2019 10:39 AM    Albumin 4.1 12/18/2019 10:39 AM    Globulin 3.1 12/18/2019 10:39 AM    A-G Ratio 1.3 12/18/2019 10:39 AM    ALT (SGPT) 62 (H) 12/18/2019 10:39 AM     Lab Results   Component Value Date/Time    Iron 185 (H) 12/18/2019 10:39 AM    TIBC 363 12/18/2019 10:39 AM    Iron % saturation 51 12/18/2019 10:39 AM    Ferritin 86 12/18/2019 10:39 AM     Lab Results   Component Value Date/Time    Folate >20.0 (H) 08/09/2018 10:28 AM     Lab Results   Component Value Date/Time    Vitamin D 25-Hydroxy 37.7 08/09/2018 10:28 AM           Assessment:     1. History of Morbid obesity, status post  laparoscopic sleeve gastrectomy. The patient has certainly under performed from the standpoint of the sleeve gastrectomy since his initial surgical procedure. There is certainly many factors to this the first being in numerous family stressors which have necessitated him to alter their diet.   His activity level is also very poor generally speaking and this is certainly affected his weight loss. I have given him the task of discussing his diet with our nutritionist and we have also discussed exercise in detail. We will go ahead and obtain his yearly lab work and add a thyroid profile to those lab values. I would like to to see him back in follow-up in the office in 6 months to see what type of progress we have made on his weight loss. Plan:     1. Remember to measure portions, continue low carbohydrate diet  2. Continue to concentrate on protein intake meeting daily requirements  3. Remember vitamin supplements. The importance of such was discussed regarding the malabsorptive issues that the surgery creates. 4. Exercise regimen appears to be: poor  5. Try and attend support group if feasible. 6. Follow-up in 6 month(s). 7. Lab ordered  8. Total time spent with the patient 30 minutes. This visit with Jessica Partida was performed under virtual telemedicine guidelines during the coronavirus (TGVPV-17) public health emergency on 5/11/2020 in an interactive   fashion using [employer]. They understand that this telemedicine encounter is a billable service, with coverage determined by their insurance carrier. They are aware that   they may receive a bill and have provided verbal consent for this virtual visit. This visit was performed with the patient in their home environment and provider was   present at [facility]. I have spent over 30 minutes on this visit  both prior to the visits reviewing the patients chart and with the patient face to face. I have reviewed their medical history, performed a telemedicine physical examination, and discussed the plan of action to date.   They understand that they will be asked   to come to the office when our office is allowed normal patient interaction, as dictated by public health officials, for a face-to-face visit to rediscuss all of the things we  have talked about today.        Jose Elias Jacobo M.D.  5/11/2020

## 2020-05-11 NOTE — PATIENT INSTRUCTIONS
Patient Instructions 1. Continue to monitor carbohydrate and protein intake- remember to keep your           total  carbohydrates to 50 grams or less per day for best results. 2. Remember hydration goals - usually 48 to 64 ounces of liquids per day 3. Continue to work towards exercise goals - minimum 3 days per week of 45          minutes to  1 hour at a time. 4. Remember to take vitamins as directed Supplement Resource Guide Importance of Protein:  
Maintains lean body mass, produces antibodies to fight off infections, heals wounds, minimizes hair loss, helps to give you energy, helps with satiety, and keeping you full between meals. Importance of Calcium: 
Needed for healthy bones and teeth, normal blood clotting, and nervous system functioning, higher risk of osteoporosis and bone disease with non-compliance. Importance of Multivitamins: Many functions. Supply you with extra nutrients that you may be missing from food. May lead to iron deficiency anemia, weakness, fatigue, and many other symptoms with non-compliance. Importance of B Vitamins: 
Important for red blood cell formation, metabolism, energy, and helps to maintain a healthy nervous system. Protein Supplement Find one you like now. Use immediately after surgery. Look for: 
35-50g protein each day from your protein supplement once you reach the progression diet. 0-3 g fat per serving 0-3 g sugar per serving Protein drinks should be split in separate dosages. Recommend: Lifelong 1 year + Calcium Supplement:  
 
Start taking within a month after surgery. Look for: Calcium Citrate Plus D (1500 mg per day) Recommend: Citracal 
 
 . Avoid chocolate chewable calcium. Can use chewable bariatric or [employer] brand or similar chewable. The body cannot absorb more than 500-600 mg @ a time. Take for Life Multi-vitamin Supplement: 1st Month After Surgery: Any complete chewable, such as: [employer]s Complete chewables. Avoid [employer] sours or gummies. They lack iron and other important nutrients and also have added sugar. Continue with chewable vitamin or change to adult complete multivitamin one month after surgery. Menstruating women can take a prenatal vitamin. Make sure has at least 18 mg iron and 676-781 mcg folic acid): Vitamin B12, B Complex Vitamin, and Biotin Start taking within a month after surgery. Vitamin B12:  1000 mcg of Vitamin B12 three times weekly Must take sublingually (meaning you take it under your tongue) or in a liquid drop form for easy absorption. B Complex Vitamin: Take a pill or liquid drop form once daily. Biotin: This vitamin can help prevent hair loss. Recommend 5mg  
(5000 mcg) a day Biotin is Optional

## 2020-05-18 ENCOUNTER — HOSPITAL ENCOUNTER (OUTPATIENT)
Dept: LAB | Age: 60
Discharge: HOME | End: 2020-05-18
Attending: SPECIALIST
Payer: OTHER GOVERNMENT

## 2020-05-18 DIAGNOSIS — K90.9 INTESTINAL MALABSORPTION, UNSPECIFIED TYPE: ICD-10-CM

## 2020-05-18 LAB
25(OH)D3 SERPL-MCNC: 46.4 NG/ML (ref 30–100)
ALBUMIN SERPL-MCNC: 4.1 G/DL (ref 3.4–5)
ALBUMIN/GLOB SERPL: 1.1 {RATIO} (ref 0.8–1.7)
ALP SERPL-CCNC: 66 U/L (ref 45–117)
ALT SERPL-CCNC: 42 U/L (ref 16–61)
ANION GAP SERPL CALC-SCNC: 3 MMOL/L (ref 3–18)
AST SERPL-CCNC: 24 U/L (ref 10–38)
BASOPHILS # BLD: 0 K/UL (ref 0–0.1)
BASOPHILS NFR BLD: 1 % (ref 0–2)
BILIRUB SERPL-MCNC: 1.5 MG/DL (ref 0.2–1)
BUN SERPL-MCNC: 15 MG/DL (ref 7–18)
BUN/CREAT SERPL: 14 (ref 12–20)
CALCIUM SERPL-MCNC: 9 MG/DL (ref 8.5–10.1)
CHLORIDE SERPL-SCNC: 108 MMOL/L (ref 100–111)
CO2 SERPL-SCNC: 29 MMOL/L (ref 21–32)
CREAT SERPL-MCNC: 1.04 MG/DL (ref 0.6–1.3)
DIFFERENTIAL METHOD BLD: ABNORMAL
EOSINOPHIL # BLD: 0.1 K/UL (ref 0–0.4)
EOSINOPHIL NFR BLD: 2 % (ref 0–5)
ERYTHROCYTE [DISTWIDTH] IN BLOOD BY AUTOMATED COUNT: 13.4 % (ref 11.6–14.5)
FERRITIN SERPL-MCNC: 11 NG/ML (ref 8–388)
FOLATE SERPL-MCNC: >20 NG/ML (ref 3.1–17.5)
GLOBULIN SER CALC-MCNC: 3.6 G/DL (ref 2–4)
GLUCOSE SERPL-MCNC: 69 MG/DL (ref 74–99)
HCT VFR BLD AUTO: 45.1 % (ref 36–48)
HGB BLD-MCNC: 14.9 G/DL (ref 13–16)
IRON SERPL-MCNC: 36 UG/DL (ref 50–175)
LYMPHOCYTES # BLD: 1.7 K/UL (ref 0.9–3.6)
LYMPHOCYTES NFR BLD: 33 % (ref 21–52)
MCH RBC QN AUTO: 28.3 PG (ref 24–34)
MCHC RBC AUTO-ENTMCNC: 33 G/DL (ref 31–37)
MCV RBC AUTO: 85.7 FL (ref 74–97)
MONOCYTES # BLD: 0.7 K/UL (ref 0.05–1.2)
MONOCYTES NFR BLD: 13 % (ref 3–10)
NEUTS SEG # BLD: 2.6 K/UL (ref 1.8–8)
NEUTS SEG NFR BLD: 51 % (ref 40–73)
PLATELET # BLD AUTO: 198 K/UL (ref 135–420)
PMV BLD AUTO: 10.1 FL (ref 9.2–11.8)
POTASSIUM SERPL-SCNC: 4.5 MMOL/L (ref 3.5–5.5)
PROT SERPL-MCNC: 7.7 G/DL (ref 6.4–8.2)
RBC # BLD AUTO: 5.26 M/UL (ref 4.7–5.5)
SODIUM SERPL-SCNC: 140 MMOL/L (ref 136–145)
T4 FREE SERPL-MCNC: 1.1 NG/DL (ref 0.7–1.5)
TSH SERPL DL<=0.05 MIU/L-ACNC: 2.73 UIU/ML (ref 0.36–3.74)
VIT B12 SERPL-MCNC: 457 PG/ML (ref 211–911)
WBC # BLD AUTO: 5.1 K/UL (ref 4.6–13.2)

## 2020-05-18 PROCEDURE — 84425 ASSAY OF VITAMIN B-1: CPT

## 2020-05-18 PROCEDURE — 83540 ASSAY OF IRON: CPT

## 2020-05-18 PROCEDURE — 82306 VITAMIN D 25 HYDROXY: CPT

## 2020-05-18 PROCEDURE — 82607 VITAMIN B-12: CPT

## 2020-05-18 PROCEDURE — 84443 ASSAY THYROID STIM HORMONE: CPT

## 2020-05-18 PROCEDURE — 82728 ASSAY OF FERRITIN: CPT

## 2020-05-18 PROCEDURE — 80053 COMPREHEN METABOLIC PANEL: CPT

## 2020-05-18 PROCEDURE — 85025 COMPLETE CBC W/AUTO DIFF WBC: CPT

## 2020-05-18 PROCEDURE — 36415 COLL VENOUS BLD VENIPUNCTURE: CPT

## 2020-05-18 PROCEDURE — 84439 ASSAY OF FREE THYROXINE: CPT

## 2020-05-20 LAB — VIT B1 BLD-SCNC: 134.6 NMOL/L (ref 66.5–200)

## 2020-06-18 ENCOUNTER — OFFICE VISIT (OUTPATIENT)
Dept: HEMATOLOGY | Age: 60
End: 2020-06-18

## 2020-06-18 VITALS
WEIGHT: 315 LBS | BODY MASS INDEX: 37.19 KG/M2 | DIASTOLIC BLOOD PRESSURE: 70 MMHG | HEART RATE: 63 BPM | TEMPERATURE: 96.6 F | SYSTOLIC BLOOD PRESSURE: 144 MMHG | HEIGHT: 77 IN | OXYGEN SATURATION: 99 %

## 2020-06-18 DIAGNOSIS — K76.0 NAFLD (NONALCOHOLIC FATTY LIVER DISEASE): Primary | ICD-10-CM

## 2020-06-18 NOTE — PROGRESS NOTES
[address] [facility], MD, [address], Cite Melissa Delarosa, MD rOtega Elizabeth PA-C Melford Demark, FROYLAN-BC     BENIGNO Michelle-BC   KAMI Park NP [facility]    at [address], [address] [place] [address], [facility] 22.    [phone]    FAX: [address] [place] [address], [address]    [phone]    FAX: [phone]         Patient Care Team:  Sherine Fortune MD as PCP - General (Internal Medicine)  Christiano Karimi MD (General Surgery)  Esthela Kraft MD (Internal Medicine)      Problem List  Date Reviewed: 11/19/2019          Codes Class Noted    Hemochromatosis ICD-10-CM: E83.119  ICD-9-CM: 275.03  12/18/2019        Severe obesity (BMI 35.0-39. 9) with comorbidity [facility]) ICD-10-CM: E66.01  ICD-9-CM: 278.01  5/3/2018        S/P laparoscopic sleeve gastrectomy ICD-10-CM: N38.53  ICD-9-CM: V45.86  8/10/2016    Overview Addendum 6/25/2019 11:18 AM by Leopoldo Tejeda NP     4/2018. Baseline weight was 360 pounds.               H/O lumbosacral spine surgery ICD-10-CM: Z98.890  ICD-9-CM: V15.29  8/10/2016        S/P appendectomy ICD-10-CM: Z90.49  ICD-9-CM: V45.89  8/10/2016        NAFLD (nonalcoholic fatty liver disease) ICD-10-CM: K76.0  ICD-9-CM: 571.8  11/7/2013        Depression ICD-10-CM: F32.9  ICD-9-CM: 311  Unknown        Atrial fibrillation ([address] ICD-10-CM: I48.91  ICD-9-CM: 427.31  11/19/2012    Overview Signed 8/10/2016 12:06 PM by Wilbur Martinez MD     In [place] with medication             High triglycerides ICD-10-CM: E78.1  ICD-9-CM: 272.1  8/1/2012        Sleep apnea ICD-10-CM: G47.30  ICD-9-CM: 780.57  8/1/2012    Overview Signed 8/1/2012 11:21 AM by Gwendolyn Navarro K     c-pap machine               Chronic back pain ICD-10-CM: M54.9, G89.29  ICD-9-CM: 724.5, 338.29  8/1/2012              Neo Prince returns to the [place] [address] for management of non-alcoholic fatty liver disease (NAFLD). The active problem list, all pertinent past medical history, medications, liver histology, radiologic findings and laboratory findings related to the liver disorder were reviewed with the patient. The patient is a 64 y.o.  male who was found to have fatty liver disease on liver biopsy at the time of gastric banding in 10/2013. The baseline weight was 360 pounds. This declined to 320 pounds but then returned to the baseline weight. He weighs 334 today. The has undergone gastric sleeve in 04/2018 by Dr. Antwon Chacon. The patient has no symptoms which could be attributed to the liver disorder. The patient completes all daily activities without any functional limitations. The patient has not experienced fatigue, fevers, chills, shortness of breath, chest pain, pain in the right side over the liver, diffuse abdominal pain, nausea, vomiting, constipation, diarrhrea, dry eyes, dry mouth, arthralgias, myalgias, yellowing of the eyes or skin, itching, dark urine, problems concentrating, swelling of the abdomen, swelling of the lower extremities, hematemesis, or hematochezia. Since the last office appointment the patient has:  Had no changes in the liver disease. Has not lost any weight. ASSESSMENT AND PLAN:  NAFLD  The patient has had 2 liver biopsies. The first at the time at the time of gastric banding in 2013. This demonstrated SHAW with stage 3 bridging fibrosis. Th second at the time of sleeve gastrectomy in 4/2018. This demonstrated NAFLD with stage 2 fibrosis. He lost about 40 pounds or 12% of body weight but then regained back to baseline weight between 2013 and 2018.   The improvement in the liver biopsy from 2013 to 2018 may reflect the weight loss he had with the gastric band. The patient has had surgical therapy for treatment of obesity. The patient lost 30 pounds since 04/2018 when he underwent gastric sleeve surgery. All features of metabolic syndrome including NAFLD should improve with weight loss. The patient was counseled regarding diet and exercise to achieve weight loss. The best diet for patients with fatty liver is one very low in carbohydrates and enriched with protein such as an [address] [place] program.   The patient was told not to consume any food products and drinks containing fructose as this enhances hepatic fat synthesis. Serologic evaluation for other causes of chronic liver disease was negative. The most recent laboratory studies indicate that the liver transaminases are normal, alkaline phosphatase is normal, tests of hepatic synthetic and metabolic function are normal, and the platelet count is normal.      Shear wave elastography suggests that the liver disease is progressing and the suggested Metavir fibrosis score is now F3, bridging fibrosis. [place] Syndrome  There is a mild elevation in total bilirubin that is mostly indirect fraction consistent with [place] disease. 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 Treatment of other medical problems in patients with chronic liver disease  There are no contraindications for the patient to take any medications that are necessary for treatment of other medical issues. Counseling for alcohol in patients with chronic liver disease  The patient was counseled regarding alcohol consumption and the effect of alcohol on chronic liver disease. The patient was reminded that alcohol can cause fatty liver. Patients who have undergone obesity surgery are at much greater risk to develop alcoholic liver injury.     Vaccinations   Vaccination for viral hepatitis A is recommended since the patient has no serologic evidence of previous exposure or vaccination with immunity. Vaccination for viral hepatitis B is not needed. The patient has serologic evidence of prior exposure or vaccination with immunity. Routine vaccinations against other bacterial and viral agents can be performed as indicated. Annual flu vaccination should be administered if indicated. ALLERGIES  No Known Allergies    MEDICATIONS  Current Outpatient Medications   Medication Sig    busPIRone (BUSPAR) 10 mg tablet Take 10 mg by mouth daily.  hydrOXYzine HCl (ATARAX) 50 mg tablet     therapeutic multivitamin-minerals (VITAMINS AND MINERALS) tablet Take 1 Tab by mouth.  sildenafil citrate (VIAGRA) 50 mg tablet Take 2 Tabs by mouth.  fexofenadine (ALLEGRA) 180 mg tablet Take 180 mg by mouth.  potassium chloride (KLOR-CON M20) 20 mEq tablet TAKE 1 TABLET DAILY    cholecalciferol (VITAMIN D3) 1,000 unit cap Take  by mouth daily.  calcium citrate 200 mg (950 mg) tablet Take  by mouth daily.  ARIPiprazole (ABILIFY) 5 mg tablet Take 5 mg by mouth daily. Indications: anxiety and depression    temazepam (RESTORIL) 30 mg capsule Take 30 mg by mouth nightly as needed for Sleep.  TESTOSTERONE (FORTESTA TD) 4 Pump(s) by TransDERmal route daily. Indications: Low T    DOFETILIDE (TIKOSYN PO) Take 500 mg by mouth two (2) times a day. A-Fib    buPROPion SR (WELLBUTRIN SR) 150 mg SR tablet Take 300 mg by mouth daily. 300 XR  Indications: ANXIETY WITH DEPRESSION     No current facility-administered medications for this visit. SYSTEM REVIEW NOT RELATED TO LIVER DISEASE OR REVIEWED ABOVE:  Constitution systems: Negative for fever, chills, weight gain, weight loss. Eyes: Negative for visual changes. ENT: Negative for sore throat, painful swallowing. Respiratory: Negative for cough, hemoptysis, SOB. Cardiology: Negative for chest pain, palpitations. GI:  Negative for constipation or diarrhea.   : Negative for urinary frequency, dysuria, hematuria, nocturia. Skin: Negative for rash. Hematology: Negative for easy bruising, blood clots. Musculo-skelatal: Negative for back pain, muscle pain, weakness. Neurologic: Negative for headaches, dizziness, vertigo, memory problems not related to HE. Psychology: Negative for anxiety, depression. FAMILY HISTORY:  The father  of MI. The mother is alive and healthy. There is no family history of liver disease. SOCIAL HISTORY:  The patient is . The patient has 3 children,   The patient has never used tobacco products. The patient had consumed 5-6 alcoholic beverages per day. Sicne my initial appointment with me in 2016 he now only consumes 0-2 alcoholic beverages per day. The patient used to work as a . The patient retired in . PHYSICAL EXAMINATION:  Visit Vitals  /70   Pulse 63   Temp (!) 96.6 °F (35.9 °C) (Tympanic)   Ht 6' 5\" (1.956 m)   Wt 334 lb (151.5 kg)   SpO2 99%   BMI 39.61 kg/m²     General: No acute distress. Eyes: Sclera anicteric. ENT: No oral lesions. Thyroid normal.  Nodes: No adenopathy. Skin: No spider angiomata. No jaundice. No palmar erythema. Respiratory: Lungs clear to auscultation. Cardiovascular: Regular heart rate. No murmurs. No JVD. Abdomen: Soft non-tender. Liver size normal to percussion/palpation. Spleen not palpable. No obvious ascites. Extremities: No edema. No muscle wasting. No gross arthritic changes. Neurologic: Alert and oriented. Cranial nerves grossly intact. No asterixis.     LABORATORY STUDIES:  Liver [place] of [address] Units 2019   WBC 4.6 - 13.2 K/uL 5.1 4.6   ANC 1.8 - 8.0 K/UL 2.6 2.3   HGB 13.0 - 16.0 g/dL 14.9 16.2 (H)    - 420 K/uL 198 155   INR 0.8 - 1.2       AST 10 - 38 U/L 24 32   ALT 16 - 61 U/L 42 62 (H)   Alk Phos 45 - 117 U/L 66 66   Bili, Total 0.2 - 1.0 MG/DL 1.5 (H) 2.0 (H)   Bili, Direct 0.0 - 0.2 MG/DL  0.4 (H)   Albumin 3.4 - 5.0 g/dL 4.1 4.1   BUN 7.0 - 18 MG/DL 15 14   Creat 0.6 - 1.3 MG/DL 1.04 0.97   Na 136 - 145 mmol/L 140 142   K 3.5 - 5.5 mmol/L 4.5 4.1   Cl 100 - 111 mmol/L 108 107   CO2 21 - 32 mmol/L 29 30   Glucose 74 - 99 mg/dL 69 (L) 62 (L)     SEROLOGIES:  Serologies Latest Ref Rng 8/10/2016   Hep A Ab, Total NEGATIVE   NEGATIVE   Hep B Surface Ag <1.00 Index <0.10   Hep B Surface Ag Interp NEG   NEGATIVE   Hep B Core Ab, Total NEGATIVE   NEGATIVE   Hep B Surface Ab >10.0 mIU/mL 223.44   Hep B Surface Ab Interp POS   POSITIVE   Hep C Ab 0.0 - 0.9 s/co ratio <0.1   Ferritin 8 - 388 NG/ML 17   Iron % Saturation % 8   C-ANCA Neg:<1:20 titer <1:20   P-ANCA Neg:<1:20 titer <1:20   ANCA Neg:<1:20 titer <1:20   ASMCA 0 - 19 Units 15   M2 Ab 0.0 - 20.0 Units 9.0   Alpha-1 antitrypsin level 90 - 200 mg/dL 140     LIVER HISTOLOGY:  10/2013. Slides reviewed by MLS. SHAW. 40% macro and micovesicular steatosis. Mild ballooning. Moderate inflammation. Bridging fibrosis. HARVINDER (221)    12/2017. Sheer wave elastography performed at [facility]. E Range: 7.31-11.27 kPa, E Mean: 8.93 kPa, E Median: 8.71 kPa, E Std: 1.5 to kPa. The results suggested a fibrosis level of F2.    4/2018. Slides reviewed by MLS. NAFLD. 20% macro and micovesicular steatosis. Mild ballooning. Mild inflammation. Stage 2 septal fibrosis. HARVINDER (111).    07/2018. TRANSIENT HEPATIC ELASTOGRAPHY:   E Range: 7.36-12.03 kPa, previously 7.31-11.27  E Mean: 9.60 kPa, previously 8.93  E Median: 9.66 kPa, previously 8.71  E Std: 1.6 kPa, previously 1.52    08/2019. Elastography of the liver:   E range: 8.62-13.21 (previously 7.36-12.03) kPa  E mean: 10.38 (9.16) kPa  E median: 9.23 (9.66) kPa  E standard: 1.89 (1.6) kPa    ENDOSCOPIC PROCEDURES:  Not available or performed    RADIOLOGY:  12/2017. Ultrasound of liver. Echogenic consistent with fatty liver. Liver mass lesion 1.5 cm which may be focal fatty sparing. No dilated bile ducts.   No ascites. 1/2017. Dynamic MRI of th liver. Changes consistent with fatty liver. Liver mass consistent with focal fatty sparing. Normal spleen. No ascites. 07/2018. Ultrasound of the liver. Liver has heterogeneously increased echotexture diffusely. 10 mm right hepatic lobe focal hyperechoic focus is seen, without increased color Doppler blood flow. Lesion may have a rim of hypoechogenicity. This finding is not identified on review of prior study. 08/2019. Ultrasound of the liver. Hepatomegaly, subjectively slightly more prominent with increased echotexture and sparing around the gallbladder fossa with hepatic steatosis. Previous 10 mm hyperechoic lesion in the left lobe was not redemonstrated. OTHER TESTING:  Not available or performed    25 minutes total time spent with this patient with more than 50% of this time spent counseling and coordinating care as described above. Follow up at the [facility] in 6 months.       NAS Rollins-C  L[facility] [place] [facility]  [address] [facility] [address], [address] [facility]   [address], [address] [facility]   [phone]

## 2020-06-24 ENCOUNTER — DOCUMENTATION ONLY (OUTPATIENT)
Dept: HEMATOLOGY | Age: 60
End: 2020-06-24

## 2020-12-22 ENCOUNTER — OFFICE VISIT (OUTPATIENT)
Dept: HEMATOLOGY | Age: 60
End: 2020-12-22
Payer: OTHER GOVERNMENT

## 2020-12-22 ENCOUNTER — HOSPITAL ENCOUNTER (OUTPATIENT)
Dept: LAB | Age: 60
Discharge: HOME | End: 2020-12-22
Payer: OTHER GOVERNMENT

## 2020-12-22 VITALS
OXYGEN SATURATION: 97 % | BODY MASS INDEX: 39.31 KG/M2 | TEMPERATURE: 98 F | HEART RATE: 64 BPM | SYSTOLIC BLOOD PRESSURE: 136 MMHG | WEIGHT: 315 LBS | DIASTOLIC BLOOD PRESSURE: 63 MMHG

## 2020-12-22 DIAGNOSIS — K76.0 NAFLD (NONALCOHOLIC FATTY LIVER DISEASE): ICD-10-CM

## 2020-12-22 DIAGNOSIS — K76.0 NAFLD (NONALCOHOLIC FATTY LIVER DISEASE): Primary | ICD-10-CM

## 2020-12-22 LAB
ALBUMIN SERPL-MCNC: 4 G/DL (ref 3.4–5)
ALBUMIN/GLOB SERPL: 1.1 {RATIO} (ref 0.8–1.7)
ALP SERPL-CCNC: 76 U/L (ref 45–117)
ALT SERPL-CCNC: 57 U/L (ref 16–61)
ANION GAP SERPL CALC-SCNC: 7 MMOL/L (ref 3–18)
AST SERPL-CCNC: 33 U/L (ref 10–38)
BASOPHILS # BLD: 0 K/UL (ref 0–0.1)
BASOPHILS NFR BLD: 1 % (ref 0–2)
BILIRUB DIRECT SERPL-MCNC: 0.3 MG/DL (ref 0–0.2)
BILIRUB SERPL-MCNC: 1.5 MG/DL (ref 0.2–1)
BUN SERPL-MCNC: 17 MG/DL (ref 7–18)
BUN/CREAT SERPL: 14 (ref 12–20)
CALCIUM SERPL-MCNC: 9.1 MG/DL (ref 8.5–10.1)
CHLORIDE SERPL-SCNC: 106 MMOL/L (ref 100–111)
CO2 SERPL-SCNC: 28 MMOL/L (ref 21–32)
CREAT SERPL-MCNC: 1.19 MG/DL (ref 0.6–1.3)
DIFFERENTIAL METHOD BLD: ABNORMAL
EOSINOPHIL # BLD: 0.1 K/UL (ref 0–0.4)
EOSINOPHIL NFR BLD: 3 % (ref 0–5)
ERYTHROCYTE [DISTWIDTH] IN BLOOD BY AUTOMATED COUNT: 16.9 % (ref 11.6–14.5)
GLOBULIN SER CALC-MCNC: 3.5 G/DL (ref 2–4)
GLUCOSE SERPL-MCNC: 127 MG/DL (ref 74–99)
HCT VFR BLD AUTO: 44.7 % (ref 36–48)
HGB BLD-MCNC: 14.4 G/DL (ref 13–16)
LYMPHOCYTES # BLD: 1 K/UL (ref 0.9–3.6)
LYMPHOCYTES NFR BLD: 26 % (ref 21–52)
MCH RBC QN AUTO: 25.5 PG (ref 24–34)
MCHC RBC AUTO-ENTMCNC: 32.2 G/DL (ref 31–37)
MCV RBC AUTO: 79.3 FL (ref 74–97)
MONOCYTES # BLD: 0.4 K/UL (ref 0.05–1.2)
MONOCYTES NFR BLD: 9 % (ref 3–10)
NEUTS SEG # BLD: 2.4 K/UL (ref 1.8–8)
NEUTS SEG NFR BLD: 61 % (ref 40–73)
PLATELET # BLD AUTO: 195 K/UL (ref 135–420)
PMV BLD AUTO: 10.2 FL (ref 9.2–11.8)
POTASSIUM SERPL-SCNC: 4.3 MMOL/L (ref 3.5–5.5)
PROT SERPL-MCNC: 7.5 G/DL (ref 6.4–8.2)
RBC # BLD AUTO: 5.64 M/UL (ref 4.7–5.5)
SODIUM SERPL-SCNC: 141 MMOL/L (ref 136–145)
WBC # BLD AUTO: 3.9 K/UL (ref 4.6–13.2)

## 2020-12-22 PROCEDURE — 80048 BASIC METABOLIC PNL TOTAL CA: CPT

## 2020-12-22 PROCEDURE — 91200 LIVER ELASTOGRAPHY: CPT | Performed by: NURSE PRACTITIONER

## 2020-12-22 PROCEDURE — 36415 COLL VENOUS BLD VENIPUNCTURE: CPT

## 2020-12-22 PROCEDURE — 80076 HEPATIC FUNCTION PANEL: CPT

## 2020-12-22 PROCEDURE — 85025 COMPLETE CBC W/AUTO DIFF WBC: CPT

## 2020-12-22 PROCEDURE — 99214 OFFICE O/P EST MOD 30 MIN: CPT | Performed by: NURSE PRACTITIONER

## 2020-12-22 RX ORDER — DICLOFENAC SODIUM 10 MG/G
GEL TOPICAL
COMMUNITY
Start: 2020-10-28 | End: 2021-06-22 | Stop reason: ALTCHOICE

## 2020-12-22 NOTE — PROGRESS NOTES
[address] [facility] [place], [place], [address], Cite Melissa Delarosa, Eladio Sun MD      Hope Post, VIOLA Broussard, MYCHALP-BC     April S BENIGNO Rubin-BC   KAMI Redding NP [address] [facility] [address]    at [facility]    [address] [facility], 81 [facility], [facility] 22.    [phone]    FAX: [address] [place] [address]    at [address], [address], [address]    [phone]    FAX: [phone]         Patient Care Team:  Ruma Segura MD as PCP - General (Internal Medicine)  Awais Carbajal MD (Internal Medicine)      Problem List  Date Reviewed: 12/22/2020          Codes Class Noted    Hemochromatosis ICD-10-CM: E83.119  ICD-9-CM: 275.03  12/18/2019        Severe obesity (BMI 35.0-39. 9) with comorbidity [facility]) ICD-10-CM: E66.01  ICD-9-CM: 278.01  5/3/2018        S/P laparoscopic sleeve gastrectomy ICD-10-CM: R80.77  ICD-9-CM: V45.86  8/10/2016    Overview Addendum 6/25/2019 11:18 AM by Soco Rachel NP     4/2018. Baseline weight was 360 pounds.               H/O lumbosacral spine surgery ICD-10-CM: Z98.890  ICD-9-CM: V15.29  8/10/2016        S/P appendectomy ICD-10-CM: Z90.49  ICD-9-CM: V45.89  8/10/2016        NAFLD (nonalcoholic fatty liver disease) ICD-10-CM: K76.0  ICD-9-CM: 571.8  11/7/2013        Depression ICD-10-CM: F32.9  ICD-9-CM: 311  Unknown        [facility] ([facility] ICD-10-CM: I48.91  ICD-9-CM: 427.31  11/19/2012    Overview Signed 8/10/2016 12:06 PM by Jessica Currie MD     In [place] [facility] [place] with medication             High triglycerides ICD-10-CM: E78.1  ICD-9-CM: 272.1  8/1/2012        Sleep apnea ICD-10-CM: G47.30  ICD-9-CM: 780.57  8/1/2012    Overview Signed 8/1/2012 11:21 AM by Alex Hyatt     c-pap machine               Chronic back pain ICD-10-CM: M54.9, G89.29  ICD-9-CM: 724.5, 338.29  8/1/2012              [facility] for management of non-alcoholic fatty liver disease (NAFLD). The active problem list, all pertinent past medical history, medications, liver histology, radiologic findings and laboratory findings related to the liver disorder were reviewed with the patient. The patient is a 64 y.o.  male who was found to have fatty liver disease on liver biopsy at the time of gastric banding in 10/2013. The baseline weight was 360 pounds. This declined to 320 pounds at his lowest. He weighs 331 today. The patient has undergone gastric sleeve surgery in 04/2018 by Dr. Sabrina Rachel. The patient has no symptoms which could be attributed to the liver disorder. The patient completes all daily activities without any functional limitations. The patient has not experienced fatigue, fevers, chills, shortness of breath, chest pain, pain in the right side over the liver, diffuse abdominal pain, nausea, vomiting, constipation, diarrhrea, dry eyes, dry mouth, arthralgias, myalgias, yellowing of the eyes or skin, itching, dark urine, problems concentrating, swelling of the abdomen, swelling of the lower extremities, hematemesis, or hematochezia. Since the last office appointment the patient has:  Had no changes in the liver disease. Has not lost any weight. No TP for the last 5 months. ASSESSMENT AND PLAN:  NAFLD  The patient has had 2 liver biopsies. The first at the time at the time of gastric banding in 2013. This demonstrated SHAW with stage 3 bridging fibrosis. Th second at the time of sleeve gastrectomy in 4/2018. This demonstrated NAFLD with stage 2 fibrosis. He lost about 40 pounds or 12% of body weight but then regained back to baseline weight between 2013 and 2018.   The improvement in the liver biopsy from 2013 to 2018 may reflect the weight loss he had with the gastric band. The patient has had surgical therapy for treatment of obesity. The patient lost 30 pounds since 04/2018 when he underwent gastric sleeve surgery. All features of metabolic syndrome including NAFLD should improve with weight loss. The patient was counseled regarding diet and exercise to achieve weight loss. The best diet for patients with fatty liver is one very low in carbohydrates and enriched with protein such as an [address] program.   The patient was told not to consume any food products and drinks containing fructose as this enhances hepatic fat synthesis. Serologic evaluation for other causes of chronic liver disease was negative. The most recent laboratory studies indicate that the liver transaminases are normal, alkaline phosphatase is normal, tests of hepatic synthetic and metabolic function are normal, and the platelet count is normal.      Shear wave elastography suggests that the liver disease is progressing and the suggested Metavir fibrosis score is now F3, bridging fibrosis. The need to perform an assessment of liver fibrosis was discussed with the patient. The Fibroscan can assess liver fibrosis and determine if a patient has advanced fibrosis or cirrhosis without the need for liver biopsy. This will be performed at the next office visit. If the Fibroscan suggests advanced fibrosis then a liver biopsy should be considered. The Fibroscan can be repeated annually or as often as clinically indicated to assess for fibrosis progression and/or regression. 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     Treatment of other medical problems in patients with chronic liver disease  There are no contraindications for the patient to take any medications that are necessary for treatment of other medical issues. Counseling for alcohol in patients with chronic liver disease  The patient was counseled regarding alcohol consumption and the effect of alcohol on chronic liver disease. The patient was reminded that alcohol can cause fatty liver. Patients who have undergone obesity surgery are at much greater risk to develop alcoholic liver injury. Vaccinations   Vaccination for viral hepatitis A is recommended since the patient has no serologic evidence of previous exposure or vaccination with immunity. Vaccination for viral hepatitis B is not needed. The patient has serologic evidence of prior exposure or vaccination with immunity. Routine vaccinations against other bacterial and viral agents can be performed as indicated. Annual flu vaccination should be administered if indicated. ALLERGIES  No Known Allergies    MEDICATIONS  Current Outpatient Medications   Medication Sig    busPIRone (BUSPAR) 10 mg tablet Take 10 mg by mouth daily.  hydrOXYzine HCl (ATARAX) 50 mg tablet     therapeutic multivitamin-minerals (VITAMINS AND MINERALS) tablet Take 1 Tab by mouth.  sildenafil citrate (VIAGRA) 50 mg tablet Take 2 Tabs by mouth.  fexofenadine (ALLEGRA) 180 mg tablet Take 180 mg by mouth.  potassium chloride (KLOR-CON M20) 20 mEq tablet TAKE 1 TABLET DAILY    cholecalciferol (VITAMIN D3) 1,000 unit cap Take  by mouth daily.  calcium citrate 200 mg (950 mg) tablet Take  by mouth daily.  ARIPiprazole (ABILIFY) 5 mg tablet Take 5 mg by mouth daily. Indications: anxiety and depression    temazepam (RESTORIL) 30 mg capsule Take 30 mg by mouth nightly as needed for Sleep.  TESTOSTERONE (FORTESTA TD) 4 Pump(s) by TransDERmal route daily. Indications: Low T    DOFETILIDE (TIKOSYN PO) Take 500 mg by mouth two (2) times a day. A-Fib    buPROPion SR (WELLBUTRIN SR) 150 mg SR tablet Take 300 mg by mouth daily.  300 XR  Indications: ANXIETY WITH DEPRESSION     No current facility-administered medications for this visit. SYSTEM REVIEW NOT RELATED TO LIVER DISEASE OR REVIEWED ABOVE:  Constitution systems: Negative for fever, chills, weight gain, weight loss. Eyes: Negative for visual changes. ENT: Negative for sore throat, painful swallowing. Respiratory: Negative for cough, hemoptysis, SOB. Cardiology: Negative for chest pain, palpitations. GI:  Negative for constipation or diarrhea. : Negative for urinary frequency, dysuria, hematuria, nocturia. Skin: Negative for rash. Hematology: Negative for easy bruising, blood clots. Musculo-skelatal: Negative for back pain, muscle pain, weakness. Neurologic: Negative for headaches, dizziness, vertigo, memory problems not related to HE. Psychology: Negative for anxiety, depression. FAMILY HISTORY:  The father  of MI. The mother is alive and healthy. There is no family history of liver disease. SOCIAL HISTORY:  The patient is . The patient has 3 children,   The patient has never used tobacco products. The patient had consumed 5-6 alcoholic beverages per day. Sicne my initial appointment with me in 2016 he now only consumes 0-2 alcoholic beverages per day. The patient used to work as a . The patient retired in . PHYSICAL EXAMINATION:  Visit Vitals  /63   Pulse 64   Temp 98 °F (36.7 °C) (Tympanic)   Wt 331 lb 8 oz (150.4 kg)   SpO2 97%   BMI 39.31 kg/m²     General: No acute distress. Eyes: Sclera anicteric. ENT: No oral lesions. Thyroid normal.  Nodes: No adenopathy. Skin: No spider angiomata. No jaundice. No palmar erythema. Respiratory: Lungs clear to auscultation. Cardiovascular: Regular heart rate. No murmurs. No JVD. Abdomen: Soft non-tender. Liver size normal to percussion/palpation. Spleen not palpable. No obvious ascites. Extremities: No edema. No muscle wasting. No gross arthritic changes. Neurologic: Alert and oriented. Cranial nerves grossly intact. No asterixis. LABORATORY STUDIES:  [facility] [place] of [address] [place] [address] Units 5/18/2020 12/18/2019   WBC 4.6 - 13.2 K/uL 5.1 4.6   ANC 1.8 - 8.0 K/UL 2.6 2.3   HGB 13.0 - 16.0 g/dL 14.9 16.2 (H)    - 420 K/uL 198 155   INR 0.8 - 1.2       AST 10 - 38 U/L 24 32   ALT 16 - 61 U/L 42 62 (H)   Alk Phos 45 - 117 U/L 66 66   Bili, Total 0.2 - 1.0 MG/DL 1.5 (H) 2.0 (H)   Bili, Direct 0.0 - 0.2 MG/DL  0.4 (H)   Albumin 3.4 - 5.0 g/dL 4.1 4.1   BUN 7.0 - 18 MG/DL 15 14   Creat 0.6 - 1.3 MG/DL 1.04 0.97   Na 136 - 145 mmol/L 140 142   K 3.5 - 5.5 mmol/L 4.5 4.1   Cl 100 - 111 mmol/L 108 107   CO2 21 - 32 mmol/L 29 30   Glucose 74 - 99 mg/dL 69 (L) 62 (L)     SEROLOGIES:  Serologies Latest Ref Rng 8/10/2016   Hep A Ab, Total NEGATIVE   NEGATIVE   Hep B Surface Ag <1.00 Index <0.10   Hep B Surface Ag Interp NEG   NEGATIVE   Hep B Core Ab, Total NEGATIVE   NEGATIVE   Hep B Surface Ab >10.0 mIU/mL 223.44   Hep B Surface Ab Interp POS   POSITIVE   Hep C Ab 0.0 - 0.9 s/co ratio <0.1   Ferritin 8 - 388 NG/ML 17   Iron % Saturation % 8   C-ANCA Neg:<1:20 titer <1:20   P-ANCA Neg:<1:20 titer <1:20   ANCA Neg:<1:20 titer <1:20   ASMCA 0 - 19 Units 15   M2 Ab 0.0 - 20.0 Units 9.0   Alpha-1 antitrypsin level 90 - 200 mg/dL 140     LIVER HISTOLOGY:  10/2013. Slides reviewed by MLS. SHAW. 40% macro and micovesicular steatosis. Mild ballooning. Moderate inflammation. Bridging fibrosis. HARVINDER (221)    12/2017. Sheer wave elastography performed at [facility]. E Range: 7.31-11.27 kPa, E Mean: 8.93 kPa, E Median: 8.71 kPa, E Std: 1.5 to kPa. The results suggested a fibrosis level of F2.    4/2018. Slides reviewed by MLS. NAFLD. 20% macro and micovesicular steatosis. Mild ballooning. Mild inflammation. Stage 2 septal fibrosis. HARVINDER (111).    07/2018.   TRANSIENT HEPATIC ELASTOGRAPHY:   E Range: 7.36-12.03 kPa, previously 7.31-11.27  E Mean: 9.60 kPa, previously 8.93  E Median: 9.66 kPa, previously 8.71  E Std: 1.6 kPa, previously 1.52    08/2019. Elastography of the liver:   E range: 8.62-13.21 (previously 7.36-12.03) kPa  E mean: 10.38 (9.16) kPa  E median: 9.23 (9.66) kPa  E standard: 1.89 (1.6) kPa    ENDOSCOPIC PROCEDURES:  Not available or performed    RADIOLOGY:  12/2017. Ultrasound of liver. Echogenic consistent with fatty liver. Liver mass lesion 1.5 cm which may be focal fatty sparing. No dilated bile ducts. No ascites. 1/2017. Dynamic MRI of th liver. Changes consistent with fatty liver. Liver mass consistent with focal fatty sparing. Normal spleen. No ascites. 07/2018. Ultrasound of the liver. Liver has heterogeneously increased echotexture diffusely. 10 mm right hepatic lobe focal hyperechoic focus is seen, without increased color Doppler blood flow. Lesion may have a rim of hypoechogenicity. This finding is not identified on review of prior study. 08/2019. Ultrasound of the liver. Hepatomegaly, subjectively slightly more prominent with increased echotexture and sparing around the gallbladder fossa with hepatic steatosis. Previous 10 mm hyperechoic lesion in the left lobe was not redemonstrated. OTHER TESTING:  Not available or performed    Follow up at the [facility] in 6 months for fibroscan.        OMAR Conroy  [facility] [place] of [address], [address] [place] [address] Laura, [address] [place] [address]   [phone]

## 2021-04-13 ENCOUNTER — DOCUMENTATION ONLY (OUTPATIENT)
Dept: SURGERY | Age: 61
End: 2021-04-13

## 2021-04-13 NOTE — PROGRESS NOTES
Per [facility] requirements;  E-mail and letter sent for follow up appointment. [employer] [facility]  [address] [facility] [address] [facility], [address] [facility] [employer] [facility] [place]  [facility]  [facility]      Dear Patient,    Your health is our main concern. It is important for your health to have follow-up lab work and to see your surgeon at 2 months, 4 months, 6 months, 9 months and annually after your weight loss surgery. Additionally, the Department of Bariatric Surgery at our hospital is a member of the [employer] of [address] [place] [address] Surgical Quality Improvement Program ([facility] NSQIP). As a participant in this program, we gather information on the outcomes of our patients after surgery. Please call the office for a follow up appointment at [phone]. If you have moved out of the area or have changed surgeons please call us and let us know the name of your doctor. Your health and feedback are important to us. We greatly appreciate your response.        Thank you,  [employer] [facility] [address] [facility]

## 2021-06-22 ENCOUNTER — HOSPITAL ENCOUNTER (OUTPATIENT)
Dept: LAB | Age: 61
Discharge: HOME | End: 2021-06-22
Payer: OTHER GOVERNMENT

## 2021-06-22 ENCOUNTER — OFFICE VISIT (OUTPATIENT)
Dept: HEMATOLOGY | Age: 61
End: 2021-06-22
Payer: OTHER GOVERNMENT

## 2021-06-22 VITALS
TEMPERATURE: 97 F | OXYGEN SATURATION: 99 % | SYSTOLIC BLOOD PRESSURE: 136 MMHG | WEIGHT: 315 LBS | HEART RATE: 61 BPM | RESPIRATION RATE: 19 BRPM | DIASTOLIC BLOOD PRESSURE: 76 MMHG | BODY MASS INDEX: 37.19 KG/M2 | HEIGHT: 77 IN

## 2021-06-22 DIAGNOSIS — K76.0 NAFLD (NONALCOHOLIC FATTY LIVER DISEASE): Primary | ICD-10-CM

## 2021-06-22 DIAGNOSIS — K76.0 NAFLD (NONALCOHOLIC FATTY LIVER DISEASE): ICD-10-CM

## 2021-06-22 LAB
ALBUMIN SERPL-MCNC: 4.1 G/DL (ref 3.4–5)
ALBUMIN/GLOB SERPL: 1.2 {RATIO} (ref 0.8–1.7)
ALP SERPL-CCNC: 66 U/L (ref 45–117)
ALT SERPL-CCNC: 54 U/L (ref 16–61)
ANION GAP SERPL CALC-SCNC: 4 MMOL/L (ref 3–18)
AST SERPL-CCNC: 33 U/L (ref 10–38)
BASOPHILS # BLD: 0 K/UL (ref 0–0.1)
BASOPHILS NFR BLD: 1 % (ref 0–2)
BILIRUB DIRECT SERPL-MCNC: 0.4 MG/DL (ref 0–0.2)
BILIRUB SERPL-MCNC: 1.4 MG/DL (ref 0.2–1)
BUN SERPL-MCNC: 16 MG/DL (ref 7–18)
BUN/CREAT SERPL: 16 (ref 12–20)
CALCIUM SERPL-MCNC: 9.5 MG/DL (ref 8.5–10.1)
CHLORIDE SERPL-SCNC: 107 MMOL/L (ref 100–111)
CO2 SERPL-SCNC: 28 MMOL/L (ref 21–32)
CREAT SERPL-MCNC: 1.02 MG/DL (ref 0.6–1.3)
DIFFERENTIAL METHOD BLD: ABNORMAL
EOSINOPHIL # BLD: 0.1 K/UL (ref 0–0.4)
EOSINOPHIL NFR BLD: 2 % (ref 0–5)
ERYTHROCYTE [DISTWIDTH] IN BLOOD BY AUTOMATED COUNT: 17.2 % (ref 11.6–14.5)
FERRITIN SERPL-MCNC: 12 NG/ML (ref 8–388)
GLOBULIN SER CALC-MCNC: 3.5 G/DL (ref 2–4)
GLUCOSE SERPL-MCNC: 95 MG/DL (ref 74–99)
HCT VFR BLD AUTO: 46.5 % (ref 36–48)
HGB BLD-MCNC: 14.4 G/DL (ref 13–16)
INR PPP: 1.3 (ref 0.8–1.2)
IRON SATN MFR SERPL: 8 % (ref 20–50)
IRON SERPL-MCNC: 38 UG/DL (ref 50–175)
LYMPHOCYTES # BLD: 1.1 K/UL (ref 0.9–3.6)
LYMPHOCYTES NFR BLD: 24 % (ref 21–52)
MCH RBC QN AUTO: 25.1 PG (ref 24–34)
MCHC RBC AUTO-ENTMCNC: 31 G/DL (ref 31–37)
MCV RBC AUTO: 81 FL (ref 74–97)
MONOCYTES # BLD: 0.7 K/UL (ref 0.05–1.2)
MONOCYTES NFR BLD: 15 % (ref 3–10)
NEUTS SEG # BLD: 2.6 K/UL (ref 1.8–8)
NEUTS SEG NFR BLD: 58 % (ref 40–73)
PLATELET # BLD AUTO: 197 K/UL (ref 135–420)
PMV BLD AUTO: 9.9 FL (ref 9.2–11.8)
POTASSIUM SERPL-SCNC: 4.4 MMOL/L (ref 3.5–5.5)
PROT SERPL-MCNC: 7.6 G/DL (ref 6.4–8.2)
PROTHROMBIN TIME: 15.6 SEC (ref 11.5–15.2)
RBC # BLD AUTO: 5.74 M/UL (ref 4.35–5.65)
SODIUM SERPL-SCNC: 139 MMOL/L (ref 136–145)
TIBC SERPL-MCNC: 480 UG/DL (ref 250–450)
WBC # BLD AUTO: 4.5 K/UL (ref 4.6–13.2)

## 2021-06-22 PROCEDURE — 83540 ASSAY OF IRON: CPT

## 2021-06-22 PROCEDURE — 85025 COMPLETE CBC W/AUTO DIFF WBC: CPT

## 2021-06-22 PROCEDURE — 99214 OFFICE O/P EST MOD 30 MIN: CPT | Performed by: NURSE PRACTITIONER

## 2021-06-22 PROCEDURE — 82728 ASSAY OF FERRITIN: CPT

## 2021-06-22 PROCEDURE — 85610 PROTHROMBIN TIME: CPT

## 2021-06-22 PROCEDURE — 91200 LIVER ELASTOGRAPHY: CPT | Performed by: NURSE PRACTITIONER

## 2021-06-22 PROCEDURE — 36415 COLL VENOUS BLD VENIPUNCTURE: CPT

## 2021-06-22 PROCEDURE — 80076 HEPATIC FUNCTION PANEL: CPT

## 2021-06-22 PROCEDURE — 80048 BASIC METABOLIC PNL TOTAL CA: CPT

## 2021-06-22 RX ORDER — VENLAFAXINE 75 MG/1
75 TABLET ORAL DAILY
COMMUNITY

## 2021-06-22 NOTE — PROGRESS NOTES
Elle Mcknight MD, [address], [place] Melissa Delarosa, MD Lia Stinson PA-C Tresa Pontiff, ACNP-BC     Sonia Rubin, AGPCNP-BC   Miles Jones, KAMI Crabtree, KAMI Ayers [facility] [address]    at [address], [address] [place] [address], [facility].    [phone]    FAX: [address] [place] [address]    at [address], [address], [address]    [phone]    FAX: [phone]         Patient Care Team:  Katlin Dejesus MD as PCP - General (Internal Medicine)  Dru Clifton MD (Internal Medicine)      Problem List  Date Reviewed: 6/22/2021        Codes Class Noted    Hemochromatosis ICD-10-CM: E83.119  ICD-9-CM: 275.03  12/18/2019        Severe obesity (BMI 35.0-39. 9) with comorbidity [facility]) ICD-10-CM: E66.01  ICD-9-CM: 278.01  5/3/2018        S/P laparoscopic sleeve gastrectomy ICD-10-CM: O10.64  ICD-9-CM: V45.86  8/10/2016    Overview Addendum 6/25/2019 11:18 AM by Margaux Harper NP     4/2018. Baseline weight was 360 pounds.               H/O lumbosacral spine surgery ICD-10-CM: Z98.890  ICD-9-CM: V15.29  8/10/2016        S/P appendectomy ICD-10-CM: Z90.49  ICD-9-CM: V45.89  8/10/2016        NAFLD (nonalcoholic fatty liver disease) ICD-10-CM: K76.0  ICD-9-CM: 571.8  11/7/2013        Depression ICD-10-CM: F32.9  ICD-9-CM: 311  Unknown        Atrial fibrillation ([facility] [address] ICD-10-CM: I48.91  ICD-9-CM: 427.31  11/19/2012    Overview Signed 8/10/2016 12:06 PM by Aj Krishnan MD     In [address],C with medication             High triglycerides ICD-10-CM: E78.1  ICD-9-CM: 272.1  8/1/2012        Sleep apnea ICD-10-CM: G47.30  ICD-9-CM: 780.57  8/1/2012    Overview Signed 8/1/2012 11:21 AM by Alvarado Cassia     c-pap machine               Chronic back pain ICD-10-CM: M54.9, G89.29  ICD-9-CM: 724.5, 338.29  8/1/2012              Elias Lino returns to the [facility] today for fibroscn assessment of hepatic fibrosis and for education and management of non-alcoholic fatty liver disease (NAFLD). The active problem list, all pertinent past medical history, medications, liver histology, radiologic findings and laboratory findings related to the liver disorder were reviewed with the patient. The patient is a 63 y.o.  male who was found to have fatty liver disease on liver biopsy at the time of gastric banding in 10/2013. The baseline weight was 360 pounds. This declined to 317 pounds today. This is the lowest weight the patient has achieved since the bariatric surgery. The patient has undergone gastric sleeve surgery in 04/2018 by Dr. Pete Fine. The patient has no symptoms which could be attributed to the liver disorder. The patient completes all daily activities without any functional limitations. The patient has not experienced fatigue, fevers, chills, shortness of breath, chest pain, pain in the right side over the liver, diffuse abdominal pain, nausea, vomiting, constipation, diarrhrea, dry eyes, dry mouth, arthralgias, myalgias, yellowing of the eyes or skin, itching, dark urine, problems concentrating, swelling of the abdomen, swelling of the lower extremities, hematemesis, or hematochezia. Since the last office appointment the patient has:  Had no changes in the liver disease. Is now at his lowest weight since the gastric sleeve surgery in 2018. Has not need therapeutic phlebotomy in over a year. ASSESSMENT AND PLAN:  NAFLD  The patient has had 2 liver biopsies. The first at the time at the time of gastric banding in 2013. This demonstrated SHAW with stage 3 bridging fibrosis. Th second at the time of sleeve gastrectomy in 4/2018.   This demonstrated NAFLD with stage 2 fibrosis. He lost about 40 pounds or 12% of body weight but then regained back to baseline weight between 2013 and 2018. The improvement in the liver biopsy from 2013 to 2018 may reflect the weight loss he had with the gastric band. The patient has had surgical therapy for treatment of obesity. The patient lost 30 pounds since 04/2018 when he underwent gastric sleeve surgery. All features of metabolic syndrome including NAFLD should improve with weight loss. The patient was counseled regarding diet and exercise to achieve weight loss. The best diet for patients with fatty liver is one very low in carbohydrates and enriched with protein such as an [address] [place] program.   The patient was told not to consume any food products and drinks containing fructose as this enhances hepatic fat synthesis. Serologic evaluation for other causes of chronic liver disease was negative. The most recent laboratory studies indicate that the liver transaminases are normal, alkaline phosphatase is normal, tests of hepatic synthetic and metabolic function are normal, and the platelet count is normal.      The need to perform an assessment of liver fibrosis was discussed with the patient. The Fibroscan can assess liver fibrosis and determine if a patient has advanced fibrosis or cirrhosis without the need for liver biopsy. This was performed in the office during this appointment. Results of the scan indicate mild to moderate liver stiffness. The suggested Metavir fibrosis score is F2 (poortal fibrosis). The scan also indicates a fatty liver. The Fibroscan can be repeated annually or as often as clinically indicated to assess for fibrosis progression and/or regression. Hereditary Hemochromatosis  Ferritin and iron panels drawn today. Mr. Brittanie Pradhan has not needed therapeutic phlebotomy in over a year. Encouraged the patient to become a regular blood donor.       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 Treatment of other medical problems in patients with chronic liver disease  There are no contraindications for the patient to take any medications that are necessary for treatment of other medical issues. Counseling for alcohol in patients with chronic liver disease  The patient was counseled regarding alcohol consumption and the effect of alcohol on chronic liver disease. The patient was reminded that alcohol can cause fatty liver. Patients who have undergone obesity surgery are at much greater risk to develop alcoholic liver injury. Vaccinations   Vaccination for viral hepatitis A is recommended since the patient has no serologic evidence of previous exposure or vaccination with immunity. Vaccination for viral hepatitis B is not needed. The patient has serologic evidence of prior exposure or vaccination with immunity. Routine vaccinations against other bacterial and viral agents can be performed as indicated. Annual flu vaccination should be administered if indicated. ALLERGIES  No Known Allergies    MEDICATIONS  Current Outpatient Medications   Medication Sig    venlafaxine (EFFEXOR) 75 mg tablet Take 75 mg by mouth daily.  busPIRone (BUSPAR) 10 mg tablet Take 10 mg by mouth daily.  hydrOXYzine HCl (ATARAX) 50 mg tablet     therapeutic multivitamin-minerals (VITAMINS AND MINERALS) tablet Take 1 Tab by mouth.  fexofenadine (ALLEGRA) 180 mg tablet Take 180 mg by mouth.  potassium chloride (KLOR-CON M20) 20 mEq tablet TAKE 1 TABLET DAILY    cholecalciferol (VITAMIN D3) 1,000 unit cap Take  by mouth daily.  calcium citrate 200 mg (950 mg) tablet Take  by mouth daily.  ARIPiprazole (ABILIFY) 5 mg tablet Take 5 mg by mouth daily.  Indications: anxiety and depression    temazepam (RESTORIL) 30 mg capsule Take 30 mg by mouth nightly as needed for Sleep.  TESTOSTERONE (FORTESTA TD) 4 Pump(s) by TransDERmal route daily. Indications: Low T    DOFETILIDE (TIKOSYN PO) Take 500 mg by mouth two (2) times a day. A-Fib    buPROPion SR (WELLBUTRIN SR) 150 mg SR tablet Take 300 mg by mouth daily. 300 XR  Indications: ANXIETY WITH DEPRESSION     No current facility-administered medications for this visit. SYSTEM REVIEW NOT RELATED TO LIVER DISEASE OR REVIEWED ABOVE:  Constitution systems: Negative for fever, chills, weight gain, weight loss. Eyes: Negative for visual changes. ENT: Negative for sore throat, painful swallowing. Respiratory: Negative for cough, hemoptysis, SOB. Cardiology: Negative for chest pain, palpitations. GI:  Negative for constipation or diarrhea. : Negative for urinary frequency, dysuria, hematuria, nocturia. Skin: Negative for rash. Hematology: Negative for easy bruising, blood clots. Musculo-skelatal: Negative for back pain, muscle pain, weakness. Neurologic: Negative for headaches, dizziness, vertigo, memory problems not related to HE. Psychology: Negative for anxiety, depression. FAMILY HISTORY:  The father  of MI. The mother is alive and healthy. There is no family history of liver disease. SOCIAL HISTORY:  The patient is . The patient has 3 children,   The patient has never used tobacco products. The patient had consumed 5-6 alcoholic beverages per day. Sicne my initial appointment with me in 2016 he now only consumes 0-2 alcoholic beverages per day. The patient used to work as a . The patient retired in . PHYSICAL EXAMINATION:  Visit Vitals  /76 (BP 1 Location: Left upper arm, BP Patient Position: Sitting, BP Cuff Size: Large adult)   Pulse 61   Temp 97 °F (36.1 °C)   Resp 19   Ht 6' 5\" (1.956 m)   Wt 317 lb (143.8 kg)   SpO2 99%   BMI 37.59 kg/m²     General: No acute distress. Eyes: Sclera anicteric.    ENT: No oral lesions. Thyroid normal.  Nodes: No adenopathy. Skin: No spider angiomata. No jaundice. No palmar erythema. Respiratory: Lungs clear to auscultation. Cardiovascular: Regular heart rate. No murmurs. No JVD. Abdomen: Soft non-tender. Liver size normal to percussion/palpation. Spleen not palpable. No obvious ascites. Extremities: No edema. No muscle wasting. No gross arthritic changes. Neurologic: Alert and oriented. Cranial nerves grossly intact. No asterixis. LABORATORY STUDIES:  [facility] [place] [address] 6/22/2021 12/22/2020   WBC 4.6 - 13.2 K/uL 4.5 (L) 3.9 (L)   ANC 1.8 - 8.0 K/UL 2.6 2.4   HGB 13.0 - 16.0 g/dL 14.4 14.4    - 420 K/uL 197 195   INR 0.8 - 1.2   1.3 (H)    AST 10 - 38 U/L 33 33   ALT 16 - 61 U/L 54 57   Alk Phos 45 - 117 U/L 66 76   Bili, Total 0.2 - 1.0 MG/DL 1.4 (H) 1.5 (H)   Bili, Direct 0.0 - 0.2 MG/DL 0.4 (H) 0.3 (H)   Albumin 3.4 - 5.0 g/dL 4.1 4.0   BUN 7.0 - 18 MG/DL 16 17   Creat 0.6 - 1.3 MG/DL 1.02 1.19   Na 136 - 145 mmol/L 139 141   K 3.5 - 5.5 mmol/L 4.4 4.3   Cl 100 - 111 mmol/L 107 106   CO2 21 - 32 mmol/L 28 28   Glucose 74 - 99 mg/dL 95 127 (H)     SEROLOGIES:  Serologies Latest Ref Rn 8/10/2016   Hep A Ab, Total NEGATIVE   NEGATIVE   Hep B Surface Ag <1.00 Index <0.10   Hep B Surface Ag Interp NEG   NEGATIVE   Hep B Core Ab, Total NEGATIVE   NEGATIVE   Hep B Surface Ab >10.0 mIU/mL 223.44   Hep B Surface Ab Interp POS   POSITIVE   Hep C Ab 0.0 - 0.9 s/co ratio <0.1   Ferritin 8 - 388 NG/ML 17   Iron % Saturation % 8   C-ANCA Neg:<1:20 titer <1:20   P-ANCA Neg:<1:20 titer <1:20   ANCA Neg:<1:20 titer <1:20   ASMCA 0 - 19 Units 15   M2 Ab 0.0 - 20.0 Units 9.0   Alpha-1 antitrypsin level 90 - 200 mg/dL 140     LIVER HISTOLOGY:  10/2013. Slides reviewed by MLS. SHAW. 40% macro and micovesicular steatosis. Mild ballooning. Moderate inflammation. Bridging fibrosis. HARVINDER (221)    12/2017.   [facility]  E Range: 7.31-11.27 kPa, E Mean: 8.93 kPa, E Median: 8.71 kPa, E Std: 1.5 to kPa. The results suggested a fibrosis level of F2.    4/2018. Slides reviewed by MLS. NAFLD. 20% macro and micovesicular steatosis. Mild ballooning. Mild inflammation. Stage 2 septal fibrosis. HARVINDER (111).    07/2018. TRANSIENT HEPATIC ELASTOGRAPHY:   E Range: 7.36-12.03 kPa, previously 7.31-11.27  E Mean: 9.60 kPa, previously 8.93  E Median: 9.66 kPa, previously 8.71  E Std: 1.6 kPa, previously 1.52    08/2019. Elastography of the liver:   E range: 8.62-13.21 (previously 7.36-12.03) kPa  E mean: 10.38 (9.16) kPa  E median: 9.23 (9.66) kPa  E standard: 1.89 (1.6) kPa    06/2021. FibroScan performed at [address]. EkPa was 7.6. IQR/med 15%. . The results suggested a fibrosis level of F2 in NAFLD. The CAP score suggests there is hepatic steatosis. ENDOSCOPIC PROCEDURES:  Not available or performed    RADIOLOGY:  12/2017. Ultrasound of liver. Echogenic consistent with fatty liver. Liver mass lesion 1.5 cm which may be focal fatty sparing. No dilated bile ducts. No ascites. 1/2017. Dynamic MRI of th liver. Changes consistent with fatty liver. Liver mass consistent with focal fatty sparing. Normal spleen. No ascites. 07/2018. Ultrasound of the liver. Liver has heterogeneously increased echotexture diffusely. 10 mm right hepatic lobe focal hyperechoic focus is seen, without increased color Doppler blood flow. Lesion may have a rim of hypoechogenicity. This finding is not identified on review of prior study. 08/2019. Ultrasound of the liver. Hepatomegaly, subjectively slightly more prominent with increased echotexture and sparing around the gallbladder fossa with hepatic steatosis. Previous 10 mm hyperechoic lesion in the left lobe was not redemonstrated.       OTHER TESTING:  Not available or performed    Follow up at the [facility] in one year for fibroscan.        NAS Rios-C  [facility] [place] of [address], [address] [place] [address] Jenny Rosario, [address] [facility]   [phone]

## 2021-12-09 ENCOUNTER — HOSPITAL ENCOUNTER (OUTPATIENT)
Dept: PREADMISSION TESTING | Age: 61
Discharge: HOME | End: 2021-12-09
Payer: OTHER GOVERNMENT

## 2021-12-09 ENCOUNTER — TRANSCRIBE ORDER (OUTPATIENT)
Dept: REGISTRATION | Age: 61
End: 2021-12-09

## 2021-12-09 DIAGNOSIS — M50.123 DISORDER OF INTERVERTEBRAL DISC AT C6-C7 LEVEL WITH RADICULOPATHY: Primary | ICD-10-CM

## 2021-12-09 DIAGNOSIS — M50.123 DISORDER OF INTERVERTEBRAL DISC AT C6-C7 LEVEL WITH RADICULOPATHY: ICD-10-CM

## 2021-12-09 LAB
ALBUMIN SERPL-MCNC: 3.6 G/DL (ref 3.4–5)
ALBUMIN/GLOB SERPL: 0.9 {RATIO} (ref 0.8–1.7)
ALP SERPL-CCNC: 84 U/L (ref 45–117)
ALT SERPL-CCNC: 67 U/L (ref 16–61)
ANION GAP SERPL CALC-SCNC: 5 MMOL/L (ref 3–18)
AST SERPL-CCNC: 41 U/L (ref 10–38)
BILIRUB SERPL-MCNC: 1.2 MG/DL (ref 0.2–1)
BUN SERPL-MCNC: 15 MG/DL (ref 7–18)
BUN/CREAT SERPL: 14 (ref 12–20)
CALCIUM SERPL-MCNC: 9.3 MG/DL (ref 8.5–10.1)
CHLORIDE SERPL-SCNC: 108 MMOL/L (ref 100–111)
CO2 SERPL-SCNC: 27 MMOL/L (ref 21–32)
CREAT SERPL-MCNC: 1.05 MG/DL (ref 0.6–1.3)
ERYTHROCYTE [DISTWIDTH] IN BLOOD BY AUTOMATED COUNT: 17.6 % (ref 11.6–14.5)
GLOBULIN SER CALC-MCNC: 3.9 G/DL (ref 2–4)
GLUCOSE SERPL-MCNC: 73 MG/DL (ref 74–99)
HCT VFR BLD AUTO: 48.4 % (ref 36–48)
HGB BLD-MCNC: 15.2 G/DL (ref 13–16)
MCH RBC QN AUTO: 25.5 PG (ref 24–34)
MCHC RBC AUTO-ENTMCNC: 31.4 G/DL (ref 31–37)
MCV RBC AUTO: 81.1 FL (ref 78–100)
NRBC # BLD: 0 K/UL (ref 0–0.01)
NRBC BLD-RTO: 0 PER 100 WBC
PLATELET # BLD AUTO: 229 K/UL (ref 135–420)
PMV BLD AUTO: 9.6 FL (ref 9.2–11.8)
POTASSIUM SERPL-SCNC: 4.7 MMOL/L (ref 3.5–5.5)
PROT SERPL-MCNC: 7.5 G/DL (ref 6.4–8.2)
RBC # BLD AUTO: 5.97 M/UL (ref 4.35–5.65)
SODIUM SERPL-SCNC: 140 MMOL/L (ref 136–145)
WBC # BLD AUTO: 5.5 K/UL (ref 4.6–13.2)

## 2021-12-09 PROCEDURE — 85027 COMPLETE CBC AUTOMATED: CPT

## 2021-12-09 PROCEDURE — 80053 COMPREHEN METABOLIC PANEL: CPT

## 2021-12-09 PROCEDURE — 36415 COLL VENOUS BLD VENIPUNCTURE: CPT

## 2021-12-11 LAB
BACTERIA SPEC CULT: NORMAL
BACTERIA SPEC CULT: NORMAL
SERVICE CMNT-IMP: NORMAL

## 2021-12-20 NOTE — NURSE NAVIGATOR
Austyn Marmolejo watched the preoperative spine seminar online and received a preoperative spine education book in anticipation of surgery.      Orthopaedic Nurse Navigator

## 2021-12-20 NOTE — H&P
Patient Name:  Thao Guaman   YOB: 1960    Chief Complaint:  Neck pain and decreased range of motion with ankylosing spondylitis. History of Chief Complaint:  Mr. Odin Zayas is a 59-year-old gentleman who is being seen in consultation at the request of Dr. Ruthy Guzman for neck pain and decreased range of motion with ankylosing spondylitis. He has had neck pain and decreased range of motion over the past several years. It seems to be getting a bit worse. It is hard to drive because he really cannot turn his neck very well. There is no specific accident or injury. He has  no bowel or bladder dysfunction. He finds that keeping still makes the pain better. He has had no chiropractic treatment, physical therapy, or injections. He has been on ibuprofen, which seems to help some. He says  he has had worsening pain with numbness and tingling in the right upper extremity. He has difficulty doing a lot of his activities of daily living. He has had some problems with his neck in the past.He has noticed weakness in the right hand, and it is difficult for him to hold things. He has lost 30 pounds through exercise. Past Medical/Surgical History:    Disease/Disorder Date Side Surgery Date Side Comment   Anxiety         Atrial fibrillation         Liver disease         Sleep apnea            Appendectomy 1997        Arthroscopy shoulder 2016 left       Cataract extraction 2015 right       Discectomy, lumbar 2003  [facility] 06/04/2021 - L5-S1     Allergies:  No known allergies.   Ingredient Reaction Medication Name Comment   NO KNOWN ALLERGIES        Current Medications:    Medication Directions   aripiprazole 5 mg tablet    bupropion HCl  mg 24 hr tablet, extended release    buspirone 10 mg tablet    dofetilide 500 mcg capsule    Fortesta 10 mg/0.5 gram/actuation transdermal gel pump    hydroxyzine HCl 50 mg tablet    ibuprofen 200 mg tablet    potassium chloride ER 20 mEq tablet,extended release(part/cryst) temazepam 30 mg capsule    venlafaxine ER 75 mg capsule,extended release 24 hr      Social History:      SMOKING  Status Tobacco Type Units Per Day Yrs Used   Never smoker        ALCOHOL  There is a history of alcohol use. Type: Beer and liquor. 2 beers consumed weekly. Family History:    Disease Detail Family Member Age Cause of Death Comments   Diabetes Mother  N    Heart disease Father  N    Hypertension Mother  N      Review of Systems:    GENERAL:  Patient has no signs of fever, chills or weight change. HEAD/ENTM: Patient presents with hearing loss and ringing in ears. Patient has no signs of headaches, dizziness, sore throat, recent cold, double vision, blurred vision, itchy eyes, eye redness or eye discharge. NEUROLOGIC:  Patient has no signs of fainting, muscle weakness, numbness/tingling, loss of balance or seizure disorder. CARDIOVASCULAR:  Patient has no signs of chest pain, palpitations, rheumatic fever or heart murmur. RESPIRATORY:  Patient has no signs of chronic cough, wheezing, difficulty breathing, pain on breathing or shortness of breath. GASTROINTESTINAL: Patient presents with gas/bloating. Patient has no signs of nausea/vomiting, difficulty swallowing, indigestion, abdominal pain, diarrhea, bloody stools or hemorrhoids. GENITOURINARY:  Patient has no signs of blood in the urine, painful urinating, burning sensation, bladder/kidney infection, frequent urinating or incontinence. MUSCULOSKELETAL:  Patient has no signs of fracture/dislocation, sprain/strain, tendonitis, joint stiffness, joint pain, rheumatoid disease, gout or swelling in feet. INTEGUMENTARY:  Patient has no signs of rash/itching, psoriasis, Raynaud's phenomenon or varicose veins. EMOTIONAL: Patient presents with anxiety and depression. Patient has no signs of bipolar disorder, memory loss or change in mood.     Vitals:  Date BP Pulse Temp (F) Resp. (per min.) Height (Total in.) Weight (lbs.) BMI   06/04/2021     77.00 310.00 36.76     Physical Examination:    General:  The patient appears healthy. Cardiovascular:  Arterial pulses are normal.  Skin:  The skin is normal appearing with no contusions or wounds noted. Heart- RRR  Lungs-CTA almas  Abdomen- +BS,soft,nontender  Musculoskeletal:  There is tenderness around the paravertebral spinal muscles. He has rotation to 20 degrees in either direction. Otherwise, the cervical spine has a normal appearance and no spasm of the paracervical muscle. There is no tenderness on palpation of the trapezius muscle. Flexion and extension of the cervical spine are normal.  Examination of the shoulder shows no warmth, no deformity, and no muscle atrophy. There is no tenderness on palpation of the subacromial bursa, the glenohumeral joint region, or the bicipital groove. Range of motion of the shoulder is normal in abduction, forward flexion, extension, and in internal and external rotation. No pain is elicited by motion of the shoulder or by impingement testing. No instability of the shoulder is noted. Neurological: He has 4/5 right hand  strength and abduction strength. Sensory and motor examination of the cervical spine elicited no tactile dysesthesia or hyperesthesia and demonstrated normal motor strength of the upper extremities. Shoulder strength is normal in flexion, abduction, adduction, and internal rotation. Reflexes and peripheral nerves are intact. Normal reflexes are present in the biceps, brachioradialis, and triceps. .  Gait and stance are normal.  Knee and ankle jerks are normal with no clonus. Radiograph Examination:  Review of his CT scan of the cervical spine reveals anterior osteophytes and bony fusion at C2 to C7, with early changes at C1-2.      AP, lateral, bilateral oblique, flexion and extension views of the cervical spine were obtained and interpreted in the office 12/6/2021 and reveal ankylosing spondylitis with anterior cervical fusion and anterior bony spurs. Review of his MRI scan of the cervical spine done at [facility] 12/2/2021 reveals a right-sided C7-T1 disc herniation with severe foraminal stenosis ( in [facility]). Impression:   Mr. Rossana Yoon has right upper extremity radiculopathy and weakness with a history of ankylosing spondylitis. Plan: We discussed treatments for the condition including surgical intervention, the risks, and benefits as well as the different surgical approaches and decision making. We also discussed nonsurgical care for this condition including medications, injections, physical therapy, rehabilitation, activity modification, and brace utilization. At this point, given the neurogenic changes, the best option would be operative intervention. We will plan for C7-T1 ACDF at his earliest convenience. We will use Zero-P versus Vectra given his loss of motion of the cervical spine. The risks versus the benefits as well as the alternatives were fully explained to the patient. Risks include, but are not limited to, paralysis, death, heart attack, stroke, pulmonary embolism, deep vein thrombosis, infection, failure to relieve pain, increase in back or arm pain, reherniation, scarring, spinal fluid leak, bowel or bladder dysfunction, bleeding, disease transmission, instrumentation failure, pseudarthrosis, difficulty swallowing, and need for revision surgery. The patient states full understanding of the risks and benefits and wishes to proceed.

## 2021-12-21 PROBLEM — M48.02 FORAMINAL STENOSIS OF CERVICAL REGION: Status: ACTIVE | Noted: 2021-12-21

## 2021-12-21 PROBLEM — M45.2 ANKYLOSING SPONDYLITIS OF CERVICAL REGION (HCC): Status: ACTIVE | Noted: 2021-12-21

## 2021-12-21 PROBLEM — M50.20 HNP (HERNIATED NUCLEUS PULPOSUS), CERVICAL: Status: ACTIVE | Noted: 2021-12-21

## 2021-12-21 RX ORDER — SODIUM CHLORIDE 0.9 % (FLUSH) 0.9 %
5-40 SYRINGE (ML) INJECTION EVERY 8 HOURS
Status: CANCELLED | OUTPATIENT
Start: 2021-12-21

## 2021-12-21 RX ORDER — SODIUM CHLORIDE 0.9 % (FLUSH) 0.9 %
5-40 SYRINGE (ML) INJECTION AS NEEDED
Status: CANCELLED | OUTPATIENT
Start: 2021-12-21

## 2021-12-23 ENCOUNTER — HOSPITAL ENCOUNTER (OUTPATIENT)
Dept: PREADMISSION TESTING | Age: 61
Discharge: HOME | End: 2021-12-23

## 2021-12-23 VITALS — BODY MASS INDEX: 32.23 KG/M2 | WEIGHT: 273 LBS | HEIGHT: 77 IN

## 2021-12-23 RX ORDER — SODIUM CHLORIDE, SODIUM LACTATE, POTASSIUM CHLORIDE, CALCIUM CHLORIDE 600; 310; 30; 20 MG/100ML; MG/100ML; MG/100ML; MG/100ML
125 INJECTION, SOLUTION INTRAVENOUS CONTINUOUS
Status: CANCELLED | OUTPATIENT
Start: 2021-12-23

## 2021-12-23 NOTE — PERIOP NOTES
PAT - SURGICAL PRE-ADMISSION INSTRUCTIONS    NAME:  Nehemias Officer                                                          TODAY'S DATE:  12/23/2021    SURGERY DATE:  12/27/2021                                  SURGERY ARRIVAL TIME:  0530 per office    1. Do NOT eat or drink anything, including candy or gum, after MIDNIGHT on 12/26/21 , unless you have specific instructions from your Surgeon or Anesthesia Provider to do so. 2. No smoking 24 hours before surgery. 3. No alcohol 24 hours prior to the day of surgery. 4. No recreational drugs for one week prior to the day of surgery. 5. Leave all valuables, including money/purse, at home. 6. Remove all jewelry, nail polish, makeup (including mascara); no lotions, powders, deodorant, or perfume/cologne/after shave. 7. Glasses/Contact lenses and Dentures may be worn to the hospital.  They will be removed prior to surgery. 8. Call your doctor if symptoms of a cold or illness develop within 24 ours prior to surgery. 9. AN ADULT MUST DRIVE YOU HOME AFTER OUTPATIENT SURGERY. 10. If you are having an OUTPATIENT procedure, please make arrangements for a responsible adult to be with you for 24 hours after your surgery. 11. If you are admitted to the hospital, you will be assigned to a bed after surgery is complete. Normally a family member will not be able to see you until you are in your assigned bed. 12. Visitation Restrictions Explained. Special Instructions:  Covid Test N/A, Vaccination Card in Media  Take these medications the morning of surgery with a sip of water:  Tikosyn, Wellbutrin, Effexor, Buspar, Bring durable medical equipment (DME) needed:    Collar  Post op sore throat issue discussed with patient  Spec Pop - a fib  Patient Prep:    use CHG solution     These surgical instructions were reviewed with patient during the PAT phone call.

## 2021-12-26 ENCOUNTER — ANESTHESIA EVENT (OUTPATIENT)
Dept: SURGERY | Age: 61
End: 2021-12-26
Payer: OTHER GOVERNMENT

## 2021-12-27 ENCOUNTER — HOSPITAL ENCOUNTER (OUTPATIENT)
Age: 61
Discharge: HOME HEALTH | End: 2021-12-27
Attending: ORTHOPAEDIC SURGERY | Admitting: ORTHOPAEDIC SURGERY
Payer: OTHER GOVERNMENT

## 2021-12-27 ENCOUNTER — ANESTHESIA (OUTPATIENT)
Dept: SURGERY | Age: 61
End: 2021-12-27
Payer: OTHER GOVERNMENT

## 2021-12-27 ENCOUNTER — APPOINTMENT (OUTPATIENT)
Dept: GENERAL RADIOLOGY | Age: 61
End: 2021-12-27
Attending: ORTHOPAEDIC SURGERY
Payer: OTHER GOVERNMENT

## 2021-12-27 VITALS
OXYGEN SATURATION: 99 % | SYSTOLIC BLOOD PRESSURE: 146 MMHG | RESPIRATION RATE: 16 BRPM | BODY MASS INDEX: 33.26 KG/M2 | TEMPERATURE: 98.3 F | DIASTOLIC BLOOD PRESSURE: 76 MMHG | HEART RATE: 88 BPM | WEIGHT: 281.7 LBS | HEIGHT: 77 IN

## 2021-12-27 DIAGNOSIS — M50.20 HNP (HERNIATED NUCLEUS PULPOSUS), CERVICAL: Primary | ICD-10-CM

## 2021-12-27 PROBLEM — M48.02 FORAMINAL STENOSIS OF CERVICAL REGION: Status: RESOLVED | Noted: 2021-12-21 | Resolved: 2021-12-27

## 2021-12-27 LAB
ABO + RH BLD: NORMAL
BLOOD GROUP ANTIBODIES SERPL: NORMAL
SPECIMEN EXP DATE BLD: NORMAL

## 2021-12-27 PROCEDURE — 74011000250 HC RX REV CODE- 250: Performed by: NURSE ANESTHETIST, CERTIFIED REGISTERED

## 2021-12-27 PROCEDURE — C1821 INTERSPINOUS IMPLANT: HCPCS | Performed by: ORTHOPAEDIC SURGERY

## 2021-12-27 PROCEDURE — 74011250636 HC RX REV CODE- 250/636: Performed by: ORTHOPAEDIC SURGERY

## 2021-12-27 PROCEDURE — 77030020782 HC GWN BAIR PAWS FLX 3M -B: Performed by: ORTHOPAEDIC SURGERY

## 2021-12-27 PROCEDURE — 77030002986 HC SUT PROL J&J -A: Performed by: ORTHOPAEDIC SURGERY

## 2021-12-27 PROCEDURE — 74011250637 HC RX REV CODE- 250/637: Performed by: ANESTHESIOLOGY

## 2021-12-27 PROCEDURE — 77030008462 HC STPLR SKN PROX J&J -A: Performed by: ORTHOPAEDIC SURGERY

## 2021-12-27 PROCEDURE — 36415 COLL VENOUS BLD VENIPUNCTURE: CPT

## 2021-12-27 PROCEDURE — 74011250636 HC RX REV CODE- 250/636: Performed by: NURSE ANESTHETIST, CERTIFIED REGISTERED

## 2021-12-27 PROCEDURE — C1713 ANCHOR/SCREW BN/BN,TIS/BN: HCPCS | Performed by: ORTHOPAEDIC SURGERY

## 2021-12-27 PROCEDURE — 76210000026 HC REC RM PH II 1 TO 1.5 HR: Performed by: ORTHOPAEDIC SURGERY

## 2021-12-27 PROCEDURE — 76010000131 HC OR TIME 2 TO 2.5 HR: Performed by: ORTHOPAEDIC SURGERY

## 2021-12-27 PROCEDURE — 77030003029 HC SUT VCRL J&J -B: Performed by: ORTHOPAEDIC SURGERY

## 2021-12-27 PROCEDURE — 2709999900 HC NON-CHARGEABLE SUPPLY: Performed by: ORTHOPAEDIC SURGERY

## 2021-12-27 PROCEDURE — 77030038552 HC DRN WND MDII -A: Performed by: ORTHOPAEDIC SURGERY

## 2021-12-27 PROCEDURE — 77030008477 HC STYL SATN SLP COVD -A: Performed by: ANESTHESIOLOGY

## 2021-12-27 PROCEDURE — 74011000272 HC RX REV CODE- 272: Performed by: ORTHOPAEDIC SURGERY

## 2021-12-27 PROCEDURE — 77030004402 HC BUR NEUR STRY -C: Performed by: ORTHOPAEDIC SURGERY

## 2021-12-27 PROCEDURE — 77030006643: Performed by: ANESTHESIOLOGY

## 2021-12-27 PROCEDURE — 77030040361 HC SLV COMPR DVT MDII -B: Performed by: ORTHOPAEDIC SURGERY

## 2021-12-27 PROCEDURE — 86901 BLOOD TYPING SEROLOGIC RH(D): CPT

## 2021-12-27 PROCEDURE — 76060000035 HC ANESTHESIA 2 TO 2.5 HR: Performed by: ORTHOPAEDIC SURGERY

## 2021-12-27 PROCEDURE — 74011000250 HC RX REV CODE- 250: Performed by: ORTHOPAEDIC SURGERY

## 2021-12-27 PROCEDURE — 77030010507 HC ADH SKN DERMBND J&J -B: Performed by: ORTHOPAEDIC SURGERY

## 2021-12-27 PROCEDURE — 77030008683 HC TU ET CUF COVD -A: Performed by: ANESTHESIOLOGY

## 2021-12-27 PROCEDURE — 77030014650 HC SEAL MTRX FLOSEL BAXT -C: Performed by: ORTHOPAEDIC SURGERY

## 2021-12-27 PROCEDURE — 76210000063 HC OR PH I REC FIRST 0.5 HR: Performed by: ORTHOPAEDIC SURGERY

## 2021-12-27 DEVICE — SCREW SPNL L16MM DIA3.7MM NONSTERILE ANT CERV PUR TI SELF: Type: IMPLANTABLE DEVICE | Site: SPINE CERVICAL | Status: FUNCTIONAL

## 2021-12-27 DEVICE — SPACER SPNL ZERO-P VA IMPL 8MM LORDOTIC STERILE: Type: IMPLANTABLE DEVICE | Site: SPINE CERVICAL | Status: FUNCTIONAL

## 2021-12-27 DEVICE — SPACER SPNL ZERO-P VA IMPL 9MM LORDOTIC STERILE: Type: IMPLANTABLE DEVICE | Site: SPINE CERVICAL | Status: FUNCTIONAL

## 2021-12-27 DEVICE — GRAFT BONE SUBSTITUTEXSM 2CC BIOACTIVE GLS PUTTY FIBERGRFT: Type: IMPLANTABLE DEVICE | Site: SPINE CERVICAL | Status: FUNCTIONAL

## 2021-12-27 RX ORDER — FENTANYL CITRATE 50 UG/ML
25 INJECTION, SOLUTION INTRAMUSCULAR; INTRAVENOUS
Status: DISCONTINUED | OUTPATIENT
Start: 2021-12-27 | End: 2021-12-27 | Stop reason: HOSPADM

## 2021-12-27 RX ORDER — OXYCODONE AND ACETAMINOPHEN 5; 325 MG/1; MG/1
1 TABLET ORAL AS NEEDED
Status: DISCONTINUED | OUTPATIENT
Start: 2021-12-27 | End: 2021-12-27 | Stop reason: HOSPADM

## 2021-12-27 RX ORDER — GLYCOPYRROLATE 0.2 MG/ML
INJECTION INTRAMUSCULAR; INTRAVENOUS AS NEEDED
Status: DISCONTINUED | OUTPATIENT
Start: 2021-12-27 | End: 2021-12-27 | Stop reason: HOSPADM

## 2021-12-27 RX ORDER — SODIUM CHLORIDE 0.9 G/100ML
IRRIGANT IRRIGATION AS NEEDED
Status: DISCONTINUED | OUTPATIENT
Start: 2021-12-27 | End: 2021-12-27 | Stop reason: HOSPADM

## 2021-12-27 RX ORDER — FENTANYL CITRATE 50 UG/ML
INJECTION, SOLUTION INTRAMUSCULAR; INTRAVENOUS AS NEEDED
Status: DISCONTINUED | OUTPATIENT
Start: 2021-12-27 | End: 2021-12-27 | Stop reason: HOSPADM

## 2021-12-27 RX ORDER — SUCCINYLCHOLINE CHLORIDE 100 MG/5ML
SYRINGE (ML) INTRAVENOUS AS NEEDED
Status: DISCONTINUED | OUTPATIENT
Start: 2021-12-27 | End: 2021-12-27 | Stop reason: HOSPADM

## 2021-12-27 RX ORDER — CYCLOBENZAPRINE HCL 10 MG
5-10 TABLET ORAL
Qty: 60 TABLET | Refills: 0 | Status: SHIPPED | OUTPATIENT
Start: 2021-12-27 | End: 2021-12-27 | Stop reason: SDUPTHER

## 2021-12-27 RX ORDER — DIAZEPAM 5 MG/1
5 TABLET ORAL ONCE
Status: COMPLETED | OUTPATIENT
Start: 2021-12-27 | End: 2021-12-27

## 2021-12-27 RX ORDER — INSULIN LISPRO 100 [IU]/ML
INJECTION, SOLUTION INTRAVENOUS; SUBCUTANEOUS ONCE
Status: DISCONTINUED | OUTPATIENT
Start: 2021-12-27 | End: 2021-12-27 | Stop reason: HOSPADM

## 2021-12-27 RX ORDER — MAGNESIUM SULFATE 100 %
4 CRYSTALS MISCELLANEOUS AS NEEDED
Status: DISCONTINUED | OUTPATIENT
Start: 2021-12-27 | End: 2021-12-27 | Stop reason: HOSPADM

## 2021-12-27 RX ORDER — OXYCODONE AND ACETAMINOPHEN 5; 325 MG/1; MG/1
1-2 TABLET ORAL
Qty: 56 TABLET | Refills: 0 | Status: SHIPPED | OUTPATIENT
Start: 2021-12-27 | End: 2022-01-03

## 2021-12-27 RX ORDER — OXYCODONE AND ACETAMINOPHEN 5; 325 MG/1; MG/1
1-2 TABLET ORAL
Qty: 56 TABLET | Refills: 0 | Status: SHIPPED | OUTPATIENT
Start: 2021-12-27 | End: 2021-12-27 | Stop reason: SDUPTHER

## 2021-12-27 RX ORDER — NALOXONE HYDROCHLORIDE 4 MG/.1ML
SPRAY NASAL
Qty: 1 EACH | Refills: 0 | Status: SHIPPED | OUTPATIENT
Start: 2021-12-27 | End: 2022-06-21

## 2021-12-27 RX ORDER — LIDOCAINE HYDROCHLORIDE 20 MG/ML
INJECTION, SOLUTION EPIDURAL; INFILTRATION; INTRACAUDAL; PERINEURAL AS NEEDED
Status: DISCONTINUED | OUTPATIENT
Start: 2021-12-27 | End: 2021-12-27 | Stop reason: HOSPADM

## 2021-12-27 RX ORDER — NALOXONE HYDROCHLORIDE 4 MG/.1ML
SPRAY NASAL
Qty: 1 EACH | Refills: 0 | Status: SHIPPED | OUTPATIENT
Start: 2021-12-27 | End: 2021-12-27 | Stop reason: SDUPTHER

## 2021-12-27 RX ORDER — ONDANSETRON 2 MG/ML
4 INJECTION INTRAMUSCULAR; INTRAVENOUS ONCE
Status: DISCONTINUED | OUTPATIENT
Start: 2021-12-27 | End: 2021-12-27 | Stop reason: HOSPADM

## 2021-12-27 RX ORDER — MIDAZOLAM HYDROCHLORIDE 1 MG/ML
INJECTION, SOLUTION INTRAMUSCULAR; INTRAVENOUS AS NEEDED
Status: DISCONTINUED | OUTPATIENT
Start: 2021-12-27 | End: 2021-12-27 | Stop reason: HOSPADM

## 2021-12-27 RX ORDER — DEXTROSE 50 % IN WATER (D50W) INTRAVENOUS SYRINGE
25-50 AS NEEDED
Status: DISCONTINUED | OUTPATIENT
Start: 2021-12-27 | End: 2021-12-27 | Stop reason: HOSPADM

## 2021-12-27 RX ORDER — NEOSTIGMINE METHYLSULFATE 1 MG/ML
INJECTION, SOLUTION INTRAVENOUS AS NEEDED
Status: DISCONTINUED | OUTPATIENT
Start: 2021-12-27 | End: 2021-12-27 | Stop reason: HOSPADM

## 2021-12-27 RX ORDER — SODIUM CHLORIDE, SODIUM LACTATE, POTASSIUM CHLORIDE, CALCIUM CHLORIDE 600; 310; 30; 20 MG/100ML; MG/100ML; MG/100ML; MG/100ML
125 INJECTION, SOLUTION INTRAVENOUS CONTINUOUS
Status: DISCONTINUED | OUTPATIENT
Start: 2021-12-27 | End: 2021-12-27 | Stop reason: HOSPADM

## 2021-12-27 RX ORDER — PROPOFOL 10 MG/ML
INJECTION, EMULSION INTRAVENOUS AS NEEDED
Status: DISCONTINUED | OUTPATIENT
Start: 2021-12-27 | End: 2021-12-27 | Stop reason: HOSPADM

## 2021-12-27 RX ORDER — DEXAMETHASONE SODIUM PHOSPHATE 4 MG/ML
INJECTION, SOLUTION INTRA-ARTICULAR; INTRALESIONAL; INTRAMUSCULAR; INTRAVENOUS; SOFT TISSUE AS NEEDED
Status: DISCONTINUED | OUTPATIENT
Start: 2021-12-27 | End: 2021-12-27 | Stop reason: HOSPADM

## 2021-12-27 RX ORDER — HYDROMORPHONE HYDROCHLORIDE 1 MG/ML
0.5 INJECTION, SOLUTION INTRAMUSCULAR; INTRAVENOUS; SUBCUTANEOUS
Status: DISCONTINUED | OUTPATIENT
Start: 2021-12-27 | End: 2021-12-27 | Stop reason: HOSPADM

## 2021-12-27 RX ORDER — ONDANSETRON 2 MG/ML
INJECTION INTRAMUSCULAR; INTRAVENOUS AS NEEDED
Status: DISCONTINUED | OUTPATIENT
Start: 2021-12-27 | End: 2021-12-27 | Stop reason: HOSPADM

## 2021-12-27 RX ORDER — HYDROMORPHONE HYDROCHLORIDE 2 MG/ML
INJECTION, SOLUTION INTRAMUSCULAR; INTRAVENOUS; SUBCUTANEOUS AS NEEDED
Status: DISCONTINUED | OUTPATIENT
Start: 2021-12-27 | End: 2021-12-27 | Stop reason: HOSPADM

## 2021-12-27 RX ORDER — BUPIVACAINE HYDROCHLORIDE 5 MG/ML
INJECTION, SOLUTION EPIDURAL; INTRACAUDAL AS NEEDED
Status: DISCONTINUED | OUTPATIENT
Start: 2021-12-27 | End: 2021-12-27 | Stop reason: HOSPADM

## 2021-12-27 RX ORDER — SODIUM CHLORIDE, SODIUM LACTATE, POTASSIUM CHLORIDE, CALCIUM CHLORIDE 600; 310; 30; 20 MG/100ML; MG/100ML; MG/100ML; MG/100ML
50 INJECTION, SOLUTION INTRAVENOUS CONTINUOUS
Status: DISCONTINUED | OUTPATIENT
Start: 2021-12-27 | End: 2021-12-27 | Stop reason: HOSPADM

## 2021-12-27 RX ORDER — NALOXONE HYDROCHLORIDE 0.4 MG/ML
0.1 INJECTION, SOLUTION INTRAMUSCULAR; INTRAVENOUS; SUBCUTANEOUS
Status: DISCONTINUED | OUTPATIENT
Start: 2021-12-27 | End: 2021-12-27 | Stop reason: HOSPADM

## 2021-12-27 RX ORDER — ROCURONIUM BROMIDE 10 MG/ML
INJECTION, SOLUTION INTRAVENOUS AS NEEDED
Status: DISCONTINUED | OUTPATIENT
Start: 2021-12-27 | End: 2021-12-27 | Stop reason: HOSPADM

## 2021-12-27 RX ORDER — CYCLOBENZAPRINE HCL 10 MG
5-10 TABLET ORAL
Qty: 60 TABLET | Refills: 0 | Status: SHIPPED | OUTPATIENT
Start: 2021-12-27 | End: 2022-01-16

## 2021-12-27 RX ADMIN — Medication 3 G: at 08:32

## 2021-12-27 RX ADMIN — SODIUM CHLORIDE, SODIUM LACTATE, POTASSIUM CHLORIDE, AND CALCIUM CHLORIDE 125 ML/HR: 600; 310; 30; 20 INJECTION, SOLUTION INTRAVENOUS at 06:35

## 2021-12-27 RX ADMIN — MIDAZOLAM 2 MG: 1 INJECTION INTRAMUSCULAR; INTRAVENOUS at 08:08

## 2021-12-27 RX ADMIN — HYDROMORPHONE HYDROCHLORIDE 1 MG: 2 INJECTION, SOLUTION INTRAMUSCULAR; INTRAVENOUS; SUBCUTANEOUS at 08:50

## 2021-12-27 RX ADMIN — ROCURONIUM BROMIDE 10 MG: 10 INJECTION, SOLUTION INTRAVENOUS at 09:35

## 2021-12-27 RX ADMIN — LIDOCAINE HYDROCHLORIDE 100 MG: 20 INJECTION, SOLUTION INTRAVENOUS at 08:16

## 2021-12-27 RX ADMIN — DIAZEPAM 5 MG: 5 TABLET ORAL at 07:08

## 2021-12-27 RX ADMIN — Medication 150 MG: at 08:17

## 2021-12-27 RX ADMIN — ROCURONIUM BROMIDE 20 MG: 10 INJECTION, SOLUTION INTRAVENOUS at 08:53

## 2021-12-27 RX ADMIN — ROCURONIUM BROMIDE 50 MG: 10 INJECTION, SOLUTION INTRAVENOUS at 08:25

## 2021-12-27 RX ADMIN — PROPOFOL 250 MG: 10 INJECTION, EMULSION INTRAVENOUS at 08:17

## 2021-12-27 RX ADMIN — SODIUM CHLORIDE, SODIUM LACTATE, POTASSIUM CHLORIDE, AND CALCIUM CHLORIDE: 600; 310; 30; 20 INJECTION, SOLUTION INTRAVENOUS at 08:43

## 2021-12-27 RX ADMIN — FENTANYL CITRATE 50 MCG: 50 INJECTION, SOLUTION INTRAMUSCULAR; INTRAVENOUS at 08:16

## 2021-12-27 RX ADMIN — SODIUM CHLORIDE, SODIUM LACTATE, POTASSIUM CHLORIDE, AND CALCIUM CHLORIDE: 600; 310; 30; 20 INJECTION, SOLUTION INTRAVENOUS at 10:30

## 2021-12-27 RX ADMIN — FENTANYL CITRATE 50 MCG: 50 INJECTION, SOLUTION INTRAMUSCULAR; INTRAVENOUS at 09:19

## 2021-12-27 RX ADMIN — ONDANSETRON HYDROCHLORIDE 4 MG: 2 INJECTION INTRAMUSCULAR; INTRAVENOUS at 08:11

## 2021-12-27 RX ADMIN — DEXAMETHASONE SODIUM PHOSPHATE 4 MG: 4 INJECTION, SOLUTION INTRAMUSCULAR; INTRAVENOUS at 10:18

## 2021-12-27 RX ADMIN — Medication 3 MG: at 10:19

## 2021-12-27 RX ADMIN — FENTANYL CITRATE 50 MCG: 50 INJECTION, SOLUTION INTRAMUSCULAR; INTRAVENOUS at 08:28

## 2021-12-27 RX ADMIN — FENTANYL CITRATE 50 MCG: 50 INJECTION, SOLUTION INTRAMUSCULAR; INTRAVENOUS at 09:03

## 2021-12-27 RX ADMIN — GLYCOPYRROLATE 0.2 MG: 0.2 INJECTION INTRAMUSCULAR; INTRAVENOUS at 08:11

## 2021-12-27 RX ADMIN — ROCURONIUM BROMIDE 10 MG: 10 INJECTION, SOLUTION INTRAVENOUS at 09:10

## 2021-12-27 RX ADMIN — GLYCOPYRROLATE 0.4 MG: 0.2 INJECTION INTRAMUSCULAR; INTRAVENOUS at 10:19

## 2021-12-27 NOTE — PROGRESS NOTES
Made a pre-procedural spiritual care visit to the patient.     [address]   [phone] - Office

## 2021-12-27 NOTE — PERIOP NOTES
Discharge instructions reviewed with patient and family. Opportunity for questions and answers given. No further questions at this time.  Including HOLLY drain instructions

## 2021-12-27 NOTE — PERIOP NOTES
Reviewed PTA medication list with patient/caregiver and patient/caregiver denies any additional medications. Patient admits to having a responsible adult care for them at home for at least 24 hours after surgery. Patient encouraged to use gown warming system and informed that using said warming gown to regulate body temperature prior to a procedure has been shown to help reduce the risks of blood clots and infection. Patient's pharmacy of choice verified and documented in PTA medication section. Dual skin assessment & fall risk band verification completed with [facility].

## 2021-12-27 NOTE — ANESTHESIA PREPROCEDURE EVALUATION
Relevant Problems   RESPIRATORY SYSTEM   (+) Sleep apnea      NEUROLOGY   (+) Depression      CARDIOVASCULAR   (+) Atrial fibrillation (HCC)      GASTROINTESTINAL   (+) NAFLD (nonalcoholic fatty liver disease)      ENDOCRINE   (+) Ankylosing spondylitis of cervical region (HCC)   (+) Severe obesity (BMI 35.0-39. 9) with comorbidity ([facility] [address]       Anesthetic History     PONV          Review of Systems / Medical History  Patient summary reviewed, nursing notes reviewed and pertinent labs reviewed    Pulmonary        Sleep apnea           Neuro/Psych         Psychiatric history     Cardiovascular    Hypertension        Dysrhythmias            GI/Hepatic/Renal           Liver disease     Endo/Other        Morbid obesity and arthritis     Other Findings              Physical Exam    Airway  Mallampati: IV  TM Distance: 4 - 6 cm  Neck ROM: decreased range of motion   Mouth opening: Normal     Cardiovascular  Regular rate and rhythm,  S1 and S2 normal,  no murmur, click, rub, or gallop             Dental  No notable dental hx       Pulmonary  Breath sounds clear to auscultation               Abdominal  GI exam deferred       Other Findings            Anesthetic Plan    ASA: 3  Anesthesia type: general          Induction: Intravenous  Anesthetic plan and risks discussed with: Patient

## 2021-12-27 NOTE — PERIOP NOTES
Tolerating po fluids without difficulty - denies swallowing difficulties , denies SOB - alert and oriented

## 2021-12-27 NOTE — DISCHARGE INSTRUCTIONS
Dr. Batlazar Leroy Operative Instructions: Cervical Fusion    *YOU MUST AVOID SMOKING OR BEING AROUND ANYONE WHO SMOKES. AVOID ALL PRODUCTS THAT CONTAIN NICOTINE. DO NOT TAKE IBUPROFEN OR ANTI--INFLAMMATORIES, AS THESE MAY ALTER THE HEALING OF THE FUSION. Diet:   1. Begin with liquids and light foods such as jell-o or soups  2. Advance as tolerated to your regular diet if not nauseated. 3.  Swallowing difficulties may be experienced up to 2 weeks post-operatively. Modify food thickness as necessary. You may also obtain over-the-counter chloraseptic spray. Medications:  1. Strong oral narcotic pain medications have been prescribed for the first few days. Use only as directed. No pain medication is capable of taking away all the pain. Taking your pills at regular intervals will give you the best chance of having less pain. 2. If you need a refill PLEASE PLAN AHEAD. Call our office during regular hours (8-5). 3. Do not combine with alcoholic beverages. 4. Be careful as you walk, climb stairs or drive as mild dizziness is not unusual.  5. Do not take medications that have not been prescribed by your surgeon. 6. You may switch to over the counter pain medication of your choice as you become more comfortable. Activity and Restrictions:  1. You should not drive until you are clear by your surgeon at your post-operative visit. 2.  In regards to your cervical collar, you MUST wear this at all times until your first follow-up appointment. 3.  You should wear the collar even when sleeping. 4.  It can be removed for short periods of time as long as you are keeping your head centered over the shoulders without rotating or looking up or down. 5. You may take short walks inside and outside of your home and climb stairs. 6. You are to avoid work, housework, yard work, snow shoveling, lifting of more than a few pounds, overhead work or strenuous activity.   7. Avoid any excessive stretching or range-of-motion of the neck. Non-painful range-of-motion of the neck is allowed  8. Follow-up with Dr. Leslie Norman in 7-10 days. DRIVIN. You should not drive until after your follow-up appointment. 2.  You can be in a vehicle for short distances, but if you travel any long distance, please stop about every 30 minutes and walk/stretch. 3.  You should NEVER drive while taking narcotic medication. BATHING and INCISION CARE:  1. The incision may be tender to touch or feel numb: this is normal.   2.  Keep the incision clean and dry. Do not get the incision wet for 5 days. The incision will be closed with sutures under the skin and the skin will be glued. 3.  Do not apply any lotions, ointments or oils on the incision. 4.  If you notice any excessive swelling, redness, or persistent drainage around the incision, notify the office immediately. RETURN TO WORK :  1. The decision to return to work will be determined on an individual basis. 2.  Many people who have a strenuous job (construction, heavy labor, etc) may need to be off work for up to 8 weeks. 3.  If you need a work note, please let us know as soon as possible, and not the same day you are planning to return to work. NUTRITION :  1.  Good nutrition is an essential part of healing. 2.  You should eat a balanced diet each day, including fruits, vegetables, dairy products and protein. 3.  Remember to drink plenty of water. 4.  If you have not had a bowel movement within 3 days of surgery, you will need to use a laxative or suppository that can be obtained over-the-counter at your local pharmacy. BONE STIMULATOR:  1. A spinal bone stimulator may be prescribed for you under certain situations. 2.  A nurse will call you if one has been requested and discuss its use for approximately 4-6 months post-op every day. 3.  This device assists in bone healing and fusion.     CALL THE OFFICE:   If you have severe pain unrelieved by the medications, new numbness or tingling in your arms;   If you have a fever of 101.0°F or greater;    If you notice excessive swelling, redness, or persistent drainage from the incision or IV site; [facility] office number is [phone] from 8:00am to 5:00pm Monday through Friday. After 5:00pm, on weekends, or holidays, please leave a message with our answering service and the doctor on-call will get back to you shortly. Patient armband removed and shredded    DISCHARGE SUMMARY from Nurse    PATIENT INSTRUCTIONS:    After general anesthesia or intravenous sedation, for 24 hours or while taking prescription Narcotics:  · Limit your activities  · Do not drive and operate hazardous machinery  · Do not make important personal or business decisions  · Do  not drink alcoholic beverages  · If you have not urinated within 8 hours after discharge, please contact your surgeon on call. Report the following to your surgeon:  · Excessive pain, swelling, redness or odor of or around the surgical area  · Temperature over 100.5  · Nausea and vomiting lasting longer than 4 hours or if unable to take medications  · Any signs of decreased circulation or nerve impairment to extremity: change in color, persistent  numbness, tingling, coldness or increase pain  · Any questions    What to do at Home:  Recommended activity: Activity as tolerated and no driving for today, No driving while on analgesics and See surgical instructions    If you experience any of the following symptoms Fever, chills, pain uncontrolled by prescribed pain meds, odorous drainage, redness and or swelling at incision site, please follow up with . *  Please give a list of your current medications to your Primary Care Provider. *  Please update this list whenever your medications are discontinued, doses are      changed, or new medications (including over-the-counter products) are added.     *  Please carry medication information at all times in case of emergency situations. These are general instructions for a healthy lifestyle:    No smoking/ No tobacco products/ Avoid exposure to second hand smoke  Surgeon General's Warning:  Quitting smoking now greatly reduces serious risk to your health. Obesity, smoking, and sedentary lifestyle greatly increases your risk for illness    A healthy diet, regular physical exercise & weight monitoring are important for maintaining a healthy lifestyle    You may be retaining fluid if you have a history of heart failure or if you experience any of the following symptoms:  Weight gain of 3 pounds or more overnight or 5 pounds in a week, increased swelling in our hands or feet or shortness of breath while lying flat in bed. Please call your doctor as soon as you notice any of these symptoms; do not wait until your next office visit. The discharge information has been reviewed with the patient and caregiver. The patient and caregiver verbalized understanding. Discharge medications reviewed with the patient and caregiver and appropriate educational materials and side effects teaching were provided. ___________________________________________________________________________________________________________________________________  Learning About Coronavirus ([id number])  Coronavirus [phone]): Overview  What is coronavirus ([id number]-[id number])? The coronavirus disease (COVID-19) is caused by a virus. It is an illness that was first found in [place], [place], in December 2019. It has since spread worldwide. The virus can cause fever, cough, and trouble breathing. In severe cases, it can cause pneumonia and make it hard to breathe without help. It can cause death. Coronaviruses are a large group of viruses. They cause the common cold. They also cause more serious illnesses like Middle East respiratory syndrome (MERS) and severe acute respiratory syndrome (SARS).  COVID-19 is caused by a novel coronavirus. That means it's a new type that has not been seen in people before. This virus spreads person-to-person through droplets from coughing and sneezing. It can also spread when you are close to someone who is infected. And it can spread when you touch something that has the virus on it, such as a doorknob or a tabletop. What can you do to protect yourself from coronavirus (COVID-19)? The best way to protect yourself from getting sick is to:  · Avoid areas where there is an outbreak. · Avoid contact with people who may be infected. · Wash your hands often with soap or alcohol-based hand sanitizers. · Avoid crowds and try to stay at least 6 feet away from other people. · Wash your hands often, especially after you cough or sneeze. Use soap and water, and scrub for at least 20 seconds. If soap and water aren't available, use an alcohol-based hand . · Avoid touching your mouth, nose, and eyes. What can you do to avoid spreading the virus to others? To help avoid spreading the virus to others:  · Cover your mouth with a tissue when you cough or sneeze. Then throw the tissue in the trash. · Use a disinfectant to clean things that you touch often. · Stay home if you are sick or have been exposed to the virus. Don't go to school, work, or public areas. And don't use public transportation. · If you are sick:  ? Leave your home only if you need to get medical care. But call the doctor's office first so they know you're coming. And wear a face mask, if you have one.  ? If you have a face mask, wear it whenever you're around other people. It can help stop the spread of the virus when you cough or sneeze. ? Clean and disinfect your home every day. Use household  and disinfectant wipes or sprays. Take special care to clean things that you grab with your hands. These include doorknobs, remote controls, phones, and handles on your refrigerator and microwave.  And don't forget countertops, tabletops, bathrooms, and computer keyboards. When to call for help  Call 911 anytime you think you may need emergency care. For example, call if:  · You have severe trouble breathing. (You can't talk at all.)  · You have constant chest pain or pressure. · You are severely dizzy or lightheaded. · You are confused or can't think clearly. · Your face and lips have a blue color. · You pass out (lose consciousness) or are very hard to wake up. Call your doctor now if you develop symptoms such as:  · Shortness of breath. · Fever. · Cough. If you need to get care, call ahead to the doctor's office for instructions before you go. Make sure you wear a face mask, if you have one, to prevent exposing other people to the virus. Where can you get the latest information? The following health organizations are tracking and studying this virus. Their websites contain the most up-to-date information. Iggy Duran also learn what to do if you think you may have been exposed to the virus. · U.S. Centers for Disease Control and Prevention (CDC): The CDC provides updated news about the disease and travel advice. The website also tells you how to prevent the spread of infection. www.cdc.gov  · World Health Organization [facility]): WHO offers information about the virus outbreaks. WHO also has travel advice. www.who.int  Current as of: April 1, 2020               Content Version: 12.4  © 2901-3240 Healthwise, Incorporated. Care instructions adapted under license by your healthcare professional. If you have questions about a medical condition or this instruction, always ask your healthcare professional. [address] any warranty or liability for your use of this information.

## 2021-12-27 NOTE — INTERVAL H&P NOTE
Update History & Physical    The Patient's History and Physical was reviewed with the patient and I examined the patient. There was no change. The surgical site was confirmed by the patient and me. Plan:  The risk, benefits, expected outcome, and alternative to the recommended procedure have been discussed with the patient. Patient understands and wants to proceed with the procedure.     Electronically signed by Luci Haynes MD on 12/27/2021 at 7:15 AM

## 2021-12-27 NOTE — OP NOTES
Operative Note      Patient: Megha Coleman               Sex: male          DOA: 12/27/2021         YOB: 1960      Age:  63 y.o. Preoperative Diagnosis: CERVICAL HNP WITH RADICULOPATHY C6-7, CERVICALGIA, OSTEOPHYTE, STENOSIS CERVICAL REGION    Postoperative Diagnosis:  CERVICAL HNP WITH RADICULOPATHY C6-7, CERVICALGIA, OSTEOPHYTE, STENOSIS CERVICAL REGION    Surgeon: Surgeon(s) and Role:     * Slime Weiss MD - Primary  Assistant: Kizzy Gupta PA-C    OR Assitance: Physician Assistant: NORMAN Goodman  Surg Asst-1: Moni Faulkner    Anesthesia:  General    Procedure:  Procedure(s):  CERVICAL 6 - THORACIC 1 ANTERIOR CERVICAL DISC FUSION WITH C-ARM     Estimated Blood Loss: 50cc                 Implants:   Implant Name Type Inv. Item Serial No.  Lot No. LRB No. Used Action   SPACER SPNL ZERO-P VA IMPL 9MM LORDOTIC STERILE [DEPUY SYNTHES [place]] - [id number]  SPACER SPNL ZERO-P VA IMPL 9MM LORDOTIC STERILE [DEPUY SYN[employer]ES [place]]  DEPUY SYNTHES [place]_WD [id number] N/A 1 Implanted   GRAFT BONE SUBSTITUTEXSM 2CC BIOACTIVE GLS PUTTY FIBERGRFT - [id number]  GRAFT BONE SUBSTITUTEXSM 2CC BIOACTIVE GLS PUTTY FIBERGRFT  PROSIDYAN_WD [id number] N/A 1 Implanted   SPACER SPNL ZERO-P VA IMPL 8MM LORDOTIC STERILE [DEPUY SYNTHES [place]] - [id number]  SPACER SPNL ZERO-P VA IMPL 8MM LORDOTIC STERILE [DEPUY SYNTHES [place]]  DEPUY SYNTHES [place]_WD [id number] N/A 1 Implanted   SCREW SPNL L16MM DIA3.7MM NONSTERILE ANT CERV PUR TI SELF - [id number]  SCREW SPNL L16MM DIA3.7MM NONSTERILE ANT CERV PUR TI SELF 000 DEPUY SYNTHES [place]_WD  N/A 4 Implanted       Specimens: * No specimens in log *    Drains: HOLLY           Complications:  None              Indications: This is a 63y.o. year-old male who presents with significant neck and upper extremity pain, worsening ability to do activities of daily living. X-rays and MRI scan revealing spinal stenosis, degenerative disk disease and disk herniation.  Having failed conservative care, he comes for operative intervention. Procedure Details:   After appropriate informed consent was obtained, the patient was taken to the operating suite and placed in a supine position on the operating table. Satisfactory general endotracheal anesthesia was induced. All pressure points were carefully padded. Perioperative antibiotics were given. The anterior neck was shaved, prepped, and draped in the usual sterile fashion. Intraoperative fluoroscopy was used to localize the C7-T1 level. A transverse linear incision was made in the left anterior neck directly and was carried down through the subcutaneous tissue. The platysma was divided with electrocautery in a transverse fashion and was undermined both superiorly and inferiorly. Dissection was continued down along the anterior border of the sternocleidomastoid muscle. The carotid artery was palpated and was swept laterally. A plane was identified between the paratracheal musculature and the sternocleidmastoid  into the prevertebral space and was developed both superiorly and inferiorly using blunt dissection. Intraoperative fluoroscopy and a spinal needle were used to localize the C7-T1 disc space. The prevertebral fascia was then incised longitudinally, and the longus colli muscles were swept laterally away from the bony elements of the spine on both sides. A self-retaining retractor with smooth blades was placed in the medial and lateral wound. 14 mm distraction pins were placed in the superior and inferior vertebral bodies. Due to the Ankylosing spondylitis there were large bone spurs and care was taken to determine the entry to the disc space. It was noted during the dissection that the disc space led to a more cranial angle and need to move the superior distraction pin. Intra-operative fluoro revealed the disc at C6-7 had need entered.   Due to damage of the disc space and Ankylosing Spondylitis, it was determined that this disc also would require fusion. Next, the C6-7 and C7-T1 discs were removed completely with curettes and pituitary rongeurs. Cartilaginous endplates were removed. Posterolateral osteophytes were removed using the 2mm Kerrison rongeur. The posterior longitudinal ligament was opened with nerve hook and generous foraminotomies were created bilaterally. The nerve roots and spinal cord were found to be freely decompressed. The wound was then irrigated copiously with 3.5% Betadine solution. Meticulous hemostasis was obtained. Osteophytes were removed from the posterior and anterior vertebral bodies with the kelli. The cartilagenous endplates were also removed from each of the vertebral bodies down to bleeding subchondral bone. The interbody space were then sized for PEEK interbody spacers with trial sizers and PEEK interbody spacers were filled with Conform Cube bone allograft then impacted into the interbody space. Each found to have a nice, snug fit. ANTERIOR INSTRUMENTATION:  Next, a Synthes Zero-P anterior cervical plate was placed at each level C6-7 and C7-T1. Screws were then placed sequentially starting with an drill then followed by 16 mm locking screws. Two screws were placed through each plate securing the plates to each vertebra under fluoroscopic guidance. Intraoperative fluoroscopy confirmed the entire construct to be in good position. The wound was once more copiously irrigated. The self-retaining retractor was removed from the wound. Meticulous hemostasis was obtained. The esophagus was inspected and was found to be intact. A HOLLY drain was inserted. The platysma was closed using interrupted 2-0 Vicryl sutures. The skin edges were closed with 3-0 PDS suture and approximated using Dermabond. A sterile dressing was applied to the wound. [facility] general endotracheal anesthesia was reversed.   The patient was then taken to the Recovery Room in satisfactory condition. Assistant surgeon was needed throughout the procedure for managing bleeding, improving visualization and closure of the surgical wounds. Procedure and extra level was discussed with Mukul Yarbrough. Unexpected to change recovery or outcomes. She expressed understanding  and all questions were answered.

## 2021-12-27 NOTE — ANESTHESIA POSTPROCEDURE EVALUATION
Post-Anesthesia Evaluation and Assessment    Cardiovascular Function/Vital Signs  Visit Vitals  BP (!) 155/69   Pulse 89   Temp 37 °C (98.6 °F)   Resp 13   Ht 6' 5\" (1.956 m)   Wt 127.8 kg (281 lb 11.2 oz)   SpO2 99%   BMI 33.40 kg/m²       Patient is status post Procedure(s):  CERVICAL 6 - THORACIC 1 ANTERIOR CERVICAL DISC FUSION WITH C-ARM. Nausea/Vomiting: Controlled. Postoperative hydration reviewed and adequate. Pain:  Pain Scale 1: Numeric (0 - 10) (12/27/21 1058)  Pain Intensity 1: 0 (12/27/21 1058)   Managed. Neurological Status:   Neuro (WDL): Within Defined Limits (12/27/21 1058)   At baseline. Mental Status and Level of Consciousness: Baseline and stable. Pulmonary Status:   O2 Device: Nasal cannula (12/27/21 1052)   Adequate oxygenation and airway patent. Complications related to anesthesia: None    Post-anesthesia assessment completed. No concerns. Patient has met all discharge requirements.     Signed By: Mervat Pike MD

## 2021-12-27 NOTE — PERIOP NOTES
Called into OR 7 requested discharge instructions to be changed from Spine to Neck and pts prescriptions sent to his pharmacy in [place]

## 2022-03-18 PROBLEM — E83.119 HEMOCHROMATOSIS: Status: ACTIVE | Noted: 2019-12-18

## 2022-03-19 PROBLEM — M45.2 ANKYLOSING SPONDYLITIS OF CERVICAL REGION (HCC): Status: ACTIVE | Noted: 2021-12-21

## 2022-03-19 PROBLEM — E66.01 SEVERE OBESITY (BMI 35.0-39.9) WITH COMORBIDITY (HCC): Status: ACTIVE | Noted: 2018-05-03

## 2022-03-22 ENCOUNTER — DOCUMENTATION ONLY (OUTPATIENT)
Dept: SURGERY | Age: 62
End: 2022-03-22

## 2022-03-22 NOTE — PROGRESS NOTES
Per [facility] requirements;  E-mail and letter sent for follow up appointment. [facility]  [address]   [address] [place], [address]                 [facility] [place]  [facility]  [facility]      Dear Patient,    Your health is our main concern. It is important for your health to have follow-up lab work and to see your surgeon at 2 months, 4 months, 6 months, 9 months and annually after your weight loss surgery. Additionally, the Department of Bariatric Surgery at our hospital is a member of the Metabolic and Bariatric Surgery Accreditation and Quality Improvement Program [facility]). As a participant in this program, we gather information on the outcomes of our patients after surgery. Please call the office for a follow up appointment at [phone]. If you have moved out of the area or have changed surgeons please call us and let us know the name of your doctor. Your health and feedback are important to us. We greatly appreciate your response.        Thank you,  [facility] [address]

## 2022-06-21 ENCOUNTER — OFFICE VISIT (OUTPATIENT)
Dept: HEMATOLOGY | Age: 62
End: 2022-06-21
Payer: OTHER GOVERNMENT

## 2022-06-21 ENCOUNTER — HOSPITAL ENCOUNTER (OUTPATIENT)
Dept: LAB | Age: 62
Discharge: HOME | End: 2022-06-21
Payer: OTHER GOVERNMENT

## 2022-06-21 VITALS
WEIGHT: 258 LBS | TEMPERATURE: 97.6 F | HEART RATE: 70 BPM | OXYGEN SATURATION: 96 % | DIASTOLIC BLOOD PRESSURE: 89 MMHG | SYSTOLIC BLOOD PRESSURE: 142 MMHG | BODY MASS INDEX: 30.59 KG/M2

## 2022-06-21 DIAGNOSIS — K76.0 NAFLD (NONALCOHOLIC FATTY LIVER DISEASE): Primary | ICD-10-CM

## 2022-06-21 DIAGNOSIS — K76.0 NAFLD (NONALCOHOLIC FATTY LIVER DISEASE): ICD-10-CM

## 2022-06-21 DIAGNOSIS — M45.2 ANKYLOSING SPONDYLITIS OF CERVICAL REGION (HCC): ICD-10-CM

## 2022-06-21 PROBLEM — E66.01 SEVERE OBESITY (BMI 35.0-39.9) WITH COMORBIDITY (HCC): Status: RESOLVED | Noted: 2018-05-03 | Resolved: 2022-06-21

## 2022-06-21 LAB
ALBUMIN SERPL-MCNC: 4 G/DL (ref 3.4–5)
ALBUMIN/GLOB SERPL: 1.1 {RATIO} (ref 0.8–1.7)
ALP SERPL-CCNC: 81 U/L (ref 45–117)
ALT SERPL-CCNC: 30 U/L (ref 16–61)
ANION GAP SERPL CALC-SCNC: 3 MMOL/L (ref 3–18)
AST SERPL-CCNC: 27 U/L (ref 10–38)
BASOPHILS # BLD: 0 K/UL (ref 0–0.1)
BASOPHILS NFR BLD: 1 % (ref 0–2)
BILIRUB DIRECT SERPL-MCNC: 0.4 MG/DL (ref 0–0.2)
BILIRUB SERPL-MCNC: 1.4 MG/DL (ref 0.2–1)
BUN SERPL-MCNC: 9 MG/DL (ref 7–18)
BUN/CREAT SERPL: 11 (ref 12–20)
CALCIUM SERPL-MCNC: 9.2 MG/DL (ref 8.5–10.1)
CHLORIDE SERPL-SCNC: 110 MMOL/L (ref 100–111)
CO2 SERPL-SCNC: 27 MMOL/L (ref 21–32)
CREAT SERPL-MCNC: 0.82 MG/DL (ref 0.6–1.3)
DIFFERENTIAL METHOD BLD: ABNORMAL
EOSINOPHIL # BLD: 0.1 K/UL (ref 0–0.4)
EOSINOPHIL NFR BLD: 2 % (ref 0–5)
ERYTHROCYTE [DISTWIDTH] IN BLOOD BY AUTOMATED COUNT: 17.8 % (ref 11.6–14.5)
FERRITIN SERPL-MCNC: 19 NG/ML (ref 8–388)
GLOBULIN SER CALC-MCNC: 3.6 G/DL (ref 2–4)
GLUCOSE SERPL-MCNC: 69 MG/DL (ref 74–99)
HCT VFR BLD AUTO: 46.8 % (ref 36–48)
HGB BLD-MCNC: 15.1 G/DL (ref 13–16)
IMM GRANULOCYTES # BLD AUTO: 0 K/UL (ref 0–0.04)
IMM GRANULOCYTES NFR BLD AUTO: 0 % (ref 0–0.5)
IRON SATN MFR SERPL: 17 % (ref 20–50)
IRON SERPL-MCNC: 69 UG/DL (ref 50–175)
LYMPHOCYTES # BLD: 1.3 K/UL (ref 0.9–3.6)
LYMPHOCYTES NFR BLD: 26 % (ref 21–52)
MCH RBC QN AUTO: 26.1 PG (ref 24–34)
MCHC RBC AUTO-ENTMCNC: 32.3 G/DL (ref 31–37)
MCV RBC AUTO: 80.8 FL (ref 78–100)
MONOCYTES # BLD: 0.6 K/UL (ref 0.05–1.2)
MONOCYTES NFR BLD: 12 % (ref 3–10)
NEUTS SEG # BLD: 3.1 K/UL (ref 1.8–8)
NEUTS SEG NFR BLD: 59 % (ref 40–73)
NRBC # BLD: 0 K/UL (ref 0–0.01)
NRBC BLD-RTO: 0 PER 100 WBC
PLATELET # BLD AUTO: 215 K/UL (ref 135–420)
PMV BLD AUTO: 10.1 FL (ref 9.2–11.8)
POTASSIUM SERPL-SCNC: 4.5 MMOL/L (ref 3.5–5.5)
PROT SERPL-MCNC: 7.6 G/DL (ref 6.4–8.2)
RBC # BLD AUTO: 5.79 M/UL (ref 4.35–5.65)
SODIUM SERPL-SCNC: 140 MMOL/L (ref 136–145)
TIBC SERPL-MCNC: 410 UG/DL (ref 250–450)
WBC # BLD AUTO: 5.2 K/UL (ref 4.6–13.2)

## 2022-06-21 PROCEDURE — 80076 HEPATIC FUNCTION PANEL: CPT

## 2022-06-21 PROCEDURE — 85025 COMPLETE CBC W/AUTO DIFF WBC: CPT

## 2022-06-21 PROCEDURE — 36415 COLL VENOUS BLD VENIPUNCTURE: CPT

## 2022-06-21 PROCEDURE — 80048 BASIC METABOLIC PNL TOTAL CA: CPT

## 2022-06-21 PROCEDURE — 83540 ASSAY OF IRON: CPT

## 2022-06-21 PROCEDURE — 82728 ASSAY OF FERRITIN: CPT

## 2022-06-21 PROCEDURE — 99214 OFFICE O/P EST MOD 30 MIN: CPT | Performed by: NURSE PRACTITIONER

## 2022-06-21 NOTE — PROGRESS NOTES
[facility] \[facility] [place] [facility]\"", MD, [place], [facility], [place]      Shelbi Cleary, VIOLA York, ACN-BC     April S Scott[place], [facility]   Michael Bar FNP-RADHA [facility]       Jaren Ayers [facility] 136    at [place] [address] [address] [facility], [address] [address]  [address].    [phone]    FAX: [address] [place] [address], [address]    [phone]    FAX: [phone]       Patient Care Team:  Sammy Cochran MD as PCP - General (Internal Medicine Physician)  Torres Spence MD (Internal Medicine Physician)      Problem List  Date Reviewed: 12/27/2021          Codes Class Noted    Ankylosing spondylitis of cervical region [facility]) ICD-10-CM: M45.2  ICD-9-CM: 720.0  12/21/2021        Hemochromatosis ICD-10-CM: E83.119  ICD-9-CM: 275.03  12/18/2019        Severe obesity (BMI 35.0-39. 9) with comorbidity [facility]) ICD-10-CM: E66.01  ICD-9-CM: 278.01  5/3/2018        S/P laparoscopic sleeve gastrectomy ICD-10-CM: W87.74  ICD-9-CM: V45.86  8/10/2016    Overview Addendum 6/25/2019 11:18 AM by Lisandra Estevez NP     4/2018. Baseline weight was 360 pounds. H/O lumbosacral spine surgery ICD-10-CM: Z98.890  ICD-9-CM: V15.29  8/10/2016        S/P appendectomy ICD-10-CM: Z90.49  ICD-9-CM: V45.89  8/10/2016        NAFLD (nonalcoholic fatty liver disease) ICD-10-CM: K76.0  ICD-9-CM: 571.8  11/7/2013        Depression ICD-10-CM: F32. A  ICD-9-CM: 732  Unknown        Atrial fibrillation (HCC) ICD-10-CM: I48.91  ICD-9-CM: 427.31  11/19/2012    Overview Signed 8/10/2016 12:06 PM by David Puga MD     In SR with medication             High triglycerides ICD-10-CM: E78.1  ICD-9-CM: 272.1  8/1/2012        Sleep apnea ICD-10-CM: G47.30  ICD-9-CM: 780.57 8/1/2012    Overview Signed 8/1/2012 11:21 AM by Estelita juarez-pap machine               Chronic back pain ICD-10-CM: M54.9, G89.29  ICD-9-CM: 724.5, 338.29  8/1/2012              Abril Asencio returns to the [facility] today for fibroscan assessment of hepatic fibrosis and for education and management of non-alcoholic fatty liver disease (NAFLD). Fibroscan was NOT performed because the patient ate within the past hour. The active problem list, all pertinent past medical history, medications, liver histology, radiologic findings and laboratory findings related to the liver disorder were reviewed with the patient. The patient is a 63 y.o.  male who was found to have fatty liver disease on liver biopsy at the time of gastric banding in 10/2013. The baseline weight was 360 pounds. This declined to 258 pounds today. This is the lowest weight the patient has achieved since the bariatric surgery. The patient has undergone gastric sleeve surgery in 04/2018 by Dr. Jannette Luz. The patient has no symptoms which could be attributed to the liver disorder. The patient completes all daily activities without any functional limitations. The patient has not experienced fatigue, fevers, chills, shortness of breath, chest pain, pain in the right side over the liver, diffuse abdominal pain, nausea, vomiting, constipation, diarrhrea, dry eyes, dry mouth, arthralgias, myalgias, yellowing of the eyes or skin, itching, dark urine, problems concentrating, swelling of the abdomen, swelling of the lower extremities, hematemesis, or hematochezia. Since the last office appointment the patient has:  Had no changes in the liver disease. Is now at his lowest weight since the gastric sleeve surgery in 2018. Has not need therapeutic phlebotomy in over 2 years. ASSESSMENT AND PLAN:  NAFLD  The patient has had 2 liver biopsies.     The first at the time at the time of gastric banding in 2013. This demonstrated SHAW with stage 3 bridging fibrosis. Th second at the time of sleeve gastrectomy in 4/2018. This demonstrated NAFLD with stage 2 fibrosis. He lost about 40 pounds or 12% of body weight but then regained back to baseline weight between 2013 and 2018. The improvement in the liver biopsy from 2013 to 2018 may reflect the weight loss he had with the gastric band. The patient has had surgical therapy for treatment of obesity. The patient lost 102 pounds since 04/2018 when he underwent gastric sleeve surgery. All features of metabolic syndrome including NAFLD should improve with weight loss. The patient was counseled regarding diet and exercise to achieve weight loss. The best diet for patients with fatty liver is one very low in carbohydrates and enriched with protein such as an [employer] [place] [employer] program.   The patient was told not to consume any food products and drinks containing fructose as this enhances hepatic fat synthesis. Serologic evaluation for other causes of chronic liver disease was negative. The most recent laboratory studies indicate that the liver transaminases are normal, alkaline phosphatase is normal, tests of hepatic synthetic and metabolic function are normal, and the platelet count is normal.      The need to perform an assessment of liver fibrosis was discussed with the patient. The Fibroscan can assess liver fibrosis and determine if a patient has advanced fibrosis or cirrhosis without the need for liver biopsy. This was performed in the office during the patient's 06/2021appointment. Results of the scan indicate mild to moderate liver stiffness. The suggested Metavir fibrosis score is F2 (poortal fibrosis). The scan also indicates a fatty liver. The Fibroscan can be repeated annually or as often as clinically indicated to assess for fibrosis progression and/or regression.     He was actually scheduled for repeat fibroscan today, but hte scan was not performed because the patient had recently eaten and the scan would not be accurate on a full stomach. Hereditary Hemochromatosis  Ferritin and iron panels drawn today. Mr. Kevin Arias has not needed therapeutic phlebotomy in over 2 years. Encouraged the patient to become a regular blood donor. 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 Treatment of other medical problems in patients with chronic liver disease  There are no contraindications for the patient to take any medications that are necessary for treatment of other medical issues. Counseling for alcohol in patients with chronic liver disease  The patient was counseled regarding alcohol consumption and the effect of alcohol on chronic liver disease. The patient was reminded that alcohol can cause fatty liver. Patients who have undergone obesity surgery are at much greater risk to develop alcoholic liver injury. Vaccinations   Vaccination for viral hepatitis A is recommended since the patient has no serologic evidence of previous exposure or vaccination with immunity. Vaccination for viral hepatitis B is not needed. The patient has serologic evidence of prior exposure or vaccination with immunity. Routine vaccinations against other bacterial and viral agents can be performed as indicated. Annual flu vaccination should be administered if indicated. ALLERGIES  No Known Allergies    MEDICATIONS  Current Outpatient Medications   Medication Sig    naloxone (NARCAN) 4 mg/actuation nasal spray Use 1 spray intranasally, then discard. Repeat with new spray every 2 min as needed for opioid overdose symptoms, alternating nostrils.  venlafaxine (EFFEXOR) 75 mg tablet Take 75 mg by mouth daily.  busPIRone (BUSPAR) 10 mg tablet Take 10 mg by mouth daily.  hydrOXYzine HCl (ATARAX) 50 mg tablet three (3) times daily as needed.  therapeutic multivitamin-minerals (VITAMINS AND MINERALS) tablet Take 1 Tab by mouth.  fexofenadine (ALLEGRA) 180 mg tablet Take 180 mg by mouth.  potassium chloride (KLOR-CON M20) 20 mEq tablet TAKE 1 TABLET DAILY    cholecalciferol (VITAMIN D3) 1,000 unit cap Take  by mouth daily.  calcium citrate 200 mg (950 mg) tablet Take  by mouth daily.  ARIPiprazole (ABILIFY) 5 mg tablet Take 5 mg by mouth daily. Indications: anxiety and depression    TESTOSTERONE (FORTESTA TD) 4 Pump(s) by TransDERmal route daily. Indications: Low T    DOFETILIDE (TIKOSYN PO) Take 500 mg by mouth two (2) times a day. A-Fib    buPROPion SR (WELLBUTRIN SR) 150 mg SR tablet Take 300 mg by mouth daily. 300 XR  Indications: ANXIETY WITH DEPRESSION     No current facility-administered medications for this visit. SYSTEM REVIEW NOT RELATED TO LIVER DISEASE OR REVIEWED ABOVE:  Constitution systems: Negative for fever, chills, weight gain, weight loss. Eyes: Negative for visual changes. ENT: Negative for sore throat, painful swallowing. Respiratory: Negative for cough, hemoptysis, SOB. Cardiology: Negative for chest pain, palpitations. GI:  Negative for constipation or diarrhea. : Negative for urinary frequency, dysuria, hematuria, nocturia. Skin: Negative for rash. Hematology: Negative for easy bruising, blood clots. Musculo-skelatal: Negative for back pain, muscle pain, weakness. Neurologic: Negative for headaches, dizziness, vertigo, memory problems not related to HE. Psychology: Negative for anxiety, depression. FAMILY HISTORY:  The father  of MI. The mother is alive and healthy. There is no family history of liver disease. SOCIAL HISTORY:  The patient is . The patient has 3 children,   The patient has never used tobacco products. The patient had consumed 5-6 alcoholic beverages per day.   Sicne my initial appointment with me in 8/2016 he now only consumes 0-2 alcoholic beverages per day. The patient used to work as a . The patient retired in 2012. PHYSICAL EXAMINATION:  Visit Vitals  BP (!) 142/89   Pulse 70   Temp 97.6 °F (36.4 °C) (Tympanic)   Wt 258 lb (117 kg)   SpO2 96%   BMI 30.59 kg/m²     General: No acute distress. Eyes: Sclera anicteric. ENT: No oral lesions. Thyroid normal.  Nodes: No adenopathy. Skin: No spider angiomata. No jaundice. No palmar erythema. Respiratory: Lungs clear to auscultation. Cardiovascular: Regular heart rate. No murmurs. No JVD. Abdomen: Soft non-tender. Liver size normal to percussion/palpation. Spleen not palpable. No obvious ascites. Extremities: No edema. No muscle wasting. No gross arthritic changes. Neurologic: Alert and oriented. Cranial nerves grossly intact. No asterixis.     LABORATORY STUDIES:  Liver [place] of [address] & Units 12/9/2021 6/22/2021   WBC 4.6 - 13.2 K/uL 5.5 4.5 (L)   ANC 1.8 - 8.0 K/UL  2.6   HGB 13.0 - 16.0 g/dL 15.2 14.4    - 420 K/uL 229 197   INR 0.8 - 1.2    1.3 (H)   AST 10 - 38 U/L 41 (H) 33   ALT 16 - 61 U/L 67 (H) 54   Alk Phos 45 - 117 U/L 84 66   Bili, Total 0.2 - 1.0 MG/DL 1.2 (H) 1.4 (H)   Bili, Direct 0.0 - 0.2 MG/DL  0.4 (H)   Albumin 3.4 - 5.0 g/dL 3.6 4.1   BUN 7.0 - 18 MG/DL 15 16   Creat 0.6 - 1.3 MG/DL 1.05 1.02   Na 136 - 145 mmol/L 140 139   K 3.5 - 5.5 mmol/L 4.7 4.4   Cl 100 - 111 mmol/L 108 107   CO2 21 - 32 mmol/L 27 28   Glucose 74 - 99 mg/dL 73 (L) 95     SEROLOGIES:  Serologies Latest Ref Rng 8/10/2016   Hep A Ab, Total NEGATIVE   NEGATIVE   Hep B Surface Ag <1.00 Index <0.10   Hep B Surface Ag Interp NEG   NEGATIVE   Hep B Core Ab, Total NEGATIVE   NEGATIVE   Hep B Surface Ab >10.0 mIU/mL 223.44   Hep B Surface Ab Interp POS   POSITIVE   Hep C Ab 0.0 - 0.9 s/co ratio <0.1   Ferritin 8 - 388 NG/ML 17   Iron % Saturation % 8   C-ANCA Neg:<1:20 titer <1:20 P-ANCA Neg:<1:20 titer <1:20   ANCA Neg:<1:20 titer <1:20   ASMCA 0 - 19 Units 15   M2 Ab 0.0 - 20.0 Units 9.0   Alpha-1 antitrypsin level 90 - 200 mg/dL 140     LIVER HISTOLOGY:  10/2013. Slides reviewed by MLS. SHAW. 40% macro and micovesicular steatosis. Mild ballooning. Moderate inflammation. Bridging fibrosis. HARVINDER (221)    12/2017. Sheer wave elastography performed at [facility]. E Range: 7.31-11.27 kPa, E Mean: 8.93 kPa, E Median: 8.71 kPa, E Std: 1.5 to kPa. The results suggested a fibrosis level of F2.    4/2018. Slides reviewed by MLS. NAFLD. 20% macro and micovesicular steatosis. Mild ballooning. Mild inflammation. Stage 2 septal fibrosis. HARVINDER (111).    07/2018. TRANSIENT HEPATIC ELASTOGRAPHY:   E Range: 7.36-12.03 kPa, previously 7.31-11.27  E Mean: 9.60 kPa, previously 8.93  E Median: 9.66 kPa, previously 8.71  E Std: 1.6 kPa, previously 1.52    08/2019. Elastography of the liver:   E range: 8.62-13.21 (previously 7.36-12.03) kPa  E mean: 10.38 (9.16) kPa  E median: 9.23 (9.66) kPa  E standard: 1.89 (1.6) kPa    06/2021. Fib[facility]Scan [facility]. EkPa was 7.6. IQR/med 15%. . The results suggested a fibrosis level of F2 in NAFLD. The CAP score suggests there is hepatic steatosis. ENDOSCOPIC PROCEDURES:  05/2022. EGD by Dr. Roby Hurley. Reports normal esophagus. RADIOLOGY:  12/2017. Ultrasound of liver. Echogenic consistent with fatty liver. Liver mass lesion 1.5 cm which may be focal fatty sparing. No dilated bile ducts. No ascites. 1/2017. Dynamic MRI of th liver. Changes consistent with fatty liver. Liver mass consistent with focal fatty sparing. Normal spleen. No ascites. 07/2018. Ultrasound of the liver. Liver has heterogeneously increased echotexture diffusely. 10 mm right hepatic lobe focal hyperechoic focus is seen, without increased color Doppler blood flow. Lesion may have a rim of hypoechogenicity.   This finding is not identified on review of prior study. 08/2019. Ultrasound of the liver. Hepatomegaly, subjectively slightly more prominent with increased echotexture and sparing around the gallbladder fossa with hepatic steatosis. Previous 10 mm hyperechoic lesion in the left lobe was not redemonstrated. 04/2022. Abdominal CT. Liver: Normal size and enhancement without focal mass or ductal dilation. OTHER TESTING:  Not available or performed    Follow up at the [facility] in one year for [address].        Mary Momin, NAS-RADHA  Liver [place] of [address], [address] [place] [address]   [phone]

## 2022-07-06 RX ORDER — LANOLIN ALCOHOL/MO/W.PET/CERES
325 CREAM (GRAM) TOPICAL
Qty: 90 TABLET | Refills: 3 | Status: SHIPPED | OUTPATIENT
Start: 2022-07-06

## 2022-11-23 NOTE — OP NOTES
[address], Catherene Crigler  MR#: [id number]  : 1960  ACCOUNT #: [de-identified]   DATE OF SERVICE: 2018    SURGEON:  Ivonne Stallings    ASSISTANT:  NORMAN Bermudez    PREOPERATIVE DIAGNOSIS:  Persistent morbid obesity, status post lap band placement. POSTOPERATIVE DIAGNOSES:    1. Persistent morbid obesity, status post lap band placement. 2.  Extensive intra-abdominal adhesions. 3.  Fatty infiltration of the liver. PROCEDURE PERFORMED:  1. Laparoscopic lysis of adhesions of greater than 30 minutes' duration. 2.  Conversion laparoscopic adjustable gastric band procedure to a laparoscopic sleeve gastrectomy. 3.  Wedge liver biopsy. 4.  Endoscopy with biopsy. Note:  NORMAN Bermudez, assisted with adhesiolysis during the case, exposure during the case, creation of the gastric sleeve, closure of fascial and skin defects. ANESTHESIA:  General endotracheal.    COMPLICATIONS:  None. ESTIMATED BLOOD LOSS:  Approximately 10 mL. SPECIMENS:  1. Sleeve gastrectomy resection specimen. 2.  Wedge liver biopsy specimen. 3.  Endoscopy biopsy specimen. IMPLANTS:  None. STATEMENT OF MEDICAL NECESSITY:  Mr. Noemy Ayala is a 61-year-old gentleman who many years ago underwent a lap band placement, which I had placed myself. He had some fair weight loss over time, but then again developed some issues with weight maintenance and also presented to me in consultation for a possible conversion procedure. He had undergone a removal of his lap band previously and at this time period he does wish to proceed with a laparoscopic sleeve gastrectomy as a secondary procedure for weight loss. At this juncture, he understands the risks of the operation and does wish to proceed. DESCRIPTION OF PROCEDURE:  The patient was brought to operating room and placed on the table in supine position.   At this time, general anesthesia was administered without any difficulty. His abdomen was then prepped and draped in a sterile fashion. Using a 15 blade, a 1 cm incision was made just above the level of the umbilicus. Veress needle approach was used to gain access to the peritoneal cavity. It was then insufflated. Once I had achieved that, I then placed the Visiport at that site and then 4 additional trocars were placed in the usual U-shaped configuration with a subxiphoid incision being made to accommodate the [facility] retractor. On entering the abdomen, the patient had had classic adhesions consistent with his prior lap band placement. I began a very arduous dissection in the subhepatic space on the left. The superior aspect of the stomach was completely adherent to the undersurface of the liver and I had to dissect the stomach off the undersurface of the liver, which was quite difficult to do. We then had to expose the left crural region such that I was confident that I would be able to proceed with the dissection and the resection. Once that was achieved, I then began to mobilize the greater curve of the stomach. I began in an area approximately 2-3 cm proximal to the pylorus and taking down the vessels of the greater curvature of the stomach individually using the Harmonic scalpel. I moved cephalad toward the short gastric vessels, which were likewise taken down all the way to the level of the left crura. In this area, there was dense adhesive disease present from the prior lap band capsule and I was finally able to expose the entire left crura in its entirety. With this having been done, I was confident that I would be able to proceed with the resection. Therefore, I positioned the ViSiGi tube transorally into the stomach and packing it to the distal prepyloric region. With this having been done, I then began the resection using the [place] stapler with black reloads and Marni-Strips. I began at the area 3 cm proximal to the pylorus.   I then in a tangential fashion fired 2 firings just past the incisura. With this having been done, I then proceeded with a vertical resection using 5 additional firings in total to complete the resection at the left crural region. The specimen itself was then removed from the operative field. I then tested the pouch using dilute methylene blue. It was noted to be clean and watertight. I then obtained hemostasis along the lateral wall of the sleeve itself. Then I turned my attention to the head of the table while performing endoscopy on the patient. The patient's oropharynx was normal.  Distal esophagus was normal.  The gastric pouch at the proximal, mid and distal sleeve was likewise normal.  I did biopsy the prepyloric region. This was submitted to pathology for permanent section. With this having been completed, I then turned my attention back to the abdominal portion of the operation, where all areas were checked once again for hemostasis and noted to be completely intact. I then placed Evicel along the entire sleeve wall. I then placed Surgicel SNoW along the entire sleeve wall likewise. With this having been completed, I then removed the liver retractor and due to its massive enlargement I did biopsy the left lobe of the liver and I submitted it to pathology for permanent section. All areas were hemostatic. Then we removed all trocars under direct visualization after placing a HOLLY drain in the upper abdomen, bringing it out via the left upper quadrant incision. With his having been done, I then closed the left lower quadrant trocar site using a transabdominal 0 PDS suture on the fascia. I closed all skin incisions using 4-0 subcuticular Monocryl. Steri-Strips and sterile dressings were applied. The patient tolerated the procedure well.       Teo HENDRICKS  D: 04/05/2018 16:52     T: 04/06/2018 05:51  JOB #: [id number]

## 2023-06-21 ENCOUNTER — HOSPITAL ENCOUNTER (OUTPATIENT)
Facility: HOSPITAL | Age: 63
Setting detail: SPECIMEN
Discharge: HOME | End: 2023-06-24
Payer: OTHER GOVERNMENT

## 2023-06-21 ENCOUNTER — OFFICE VISIT (OUTPATIENT)
Age: 63
End: 2023-06-21
Payer: OTHER GOVERNMENT

## 2023-06-21 VITALS
DIASTOLIC BLOOD PRESSURE: 72 MMHG | HEIGHT: 77 IN | SYSTOLIC BLOOD PRESSURE: 131 MMHG | WEIGHT: 253 LBS | HEART RATE: 71 BPM | OXYGEN SATURATION: 96 % | BODY MASS INDEX: 29.87 KG/M2 | TEMPERATURE: 96.9 F

## 2023-06-21 DIAGNOSIS — K76.0 FATTY (CHANGE OF) LIVER, NOT ELSEWHERE CLASSIFIED: Primary | ICD-10-CM

## 2023-06-21 DIAGNOSIS — K76.0 FATTY (CHANGE OF) LIVER, NOT ELSEWHERE CLASSIFIED: ICD-10-CM

## 2023-06-21 LAB
ALBUMIN SERPL-MCNC: 3.7 G/DL (ref 3.4–5)
ALBUMIN/GLOB SERPL: 1 (ref 0.8–1.7)
ALP SERPL-CCNC: 73 U/L (ref 45–117)
ALT SERPL-CCNC: 20 U/L (ref 16–61)
ANION GAP SERPL CALC-SCNC: 5 MMOL/L (ref 3–18)
AST SERPL-CCNC: 31 U/L (ref 10–38)
BASOPHILS # BLD: 0.1 K/UL (ref 0–0.1)
BASOPHILS NFR BLD: 1 % (ref 0–2)
BILIRUB DIRECT SERPL-MCNC: 0.4 MG/DL (ref 0–0.2)
BILIRUB SERPL-MCNC: 1.2 MG/DL (ref 0.2–1)
BUN SERPL-MCNC: 14 MG/DL (ref 7–18)
BUN/CREAT SERPL: 17 (ref 12–20)
CALCIUM SERPL-MCNC: 9.2 MG/DL (ref 8.5–10.1)
CHLORIDE SERPL-SCNC: 106 MMOL/L (ref 100–111)
CO2 SERPL-SCNC: 29 MMOL/L (ref 21–32)
CREAT SERPL-MCNC: 0.82 MG/DL (ref 0.6–1.3)
DIFFERENTIAL METHOD BLD: ABNORMAL
EOSINOPHIL # BLD: 0.1 K/UL (ref 0–0.4)
EOSINOPHIL NFR BLD: 2 % (ref 0–5)
ERYTHROCYTE [DISTWIDTH] IN BLOOD BY AUTOMATED COUNT: 16.1 % (ref 11.6–14.5)
FERRITIN SERPL-MCNC: 22 NG/ML (ref 8–388)
GLOBULIN SER CALC-MCNC: 3.8 G/DL (ref 2–4)
GLUCOSE SERPL-MCNC: 83 MG/DL (ref 74–99)
HCT VFR BLD AUTO: 44.4 % (ref 36–48)
HGB BLD-MCNC: 14.3 G/DL (ref 13–16)
IMM GRANULOCYTES # BLD AUTO: 0 K/UL (ref 0–0.04)
IMM GRANULOCYTES NFR BLD AUTO: 1 % (ref 0–0.5)
IRON SATN MFR SERPL: 12 % (ref 20–50)
IRON SERPL-MCNC: 58 UG/DL (ref 50–175)
LYMPHOCYTES # BLD: 1.2 K/UL (ref 0.9–3.6)
LYMPHOCYTES NFR BLD: 29 % (ref 21–52)
MCH RBC QN AUTO: 28.1 PG (ref 24–34)
MCHC RBC AUTO-ENTMCNC: 32.2 G/DL (ref 31–37)
MCV RBC AUTO: 87.4 FL (ref 78–100)
MONOCYTES # BLD: 0.7 K/UL (ref 0.05–1.2)
MONOCYTES NFR BLD: 18 % (ref 3–10)
NEUTS SEG # BLD: 2 K/UL (ref 1.8–8)
NEUTS SEG NFR BLD: 49 % (ref 40–73)
NRBC # BLD: 0 K/UL (ref 0–0.01)
NRBC BLD-RTO: 0 PER 100 WBC
PLATELET # BLD AUTO: 241 K/UL (ref 135–420)
PMV BLD AUTO: 9.7 FL (ref 9.2–11.8)
POTASSIUM SERPL-SCNC: 4.9 MMOL/L (ref 3.5–5.5)
PROT SERPL-MCNC: 7.5 G/DL (ref 6.4–8.2)
RBC # BLD AUTO: 5.08 M/UL (ref 4.35–5.65)
SODIUM SERPL-SCNC: 140 MMOL/L (ref 136–145)
TIBC SERPL-MCNC: 477 UG/DL (ref 250–450)
WBC # BLD AUTO: 4 K/UL (ref 4.6–13.2)

## 2023-06-21 PROCEDURE — 82728 ASSAY OF FERRITIN: CPT

## 2023-06-21 PROCEDURE — 36415 COLL VENOUS BLD VENIPUNCTURE: CPT

## 2023-06-21 PROCEDURE — 80048 BASIC METABOLIC PNL TOTAL CA: CPT

## 2023-06-21 PROCEDURE — 80076 HEPATIC FUNCTION PANEL: CPT

## 2023-06-21 PROCEDURE — 83540 ASSAY OF IRON: CPT

## 2023-06-21 PROCEDURE — 85025 COMPLETE CBC W/AUTO DIFF WBC: CPT

## 2023-06-21 PROCEDURE — 83550 IRON BINDING TEST: CPT

## 2023-06-21 PROCEDURE — 99214 OFFICE O/P EST MOD 30 MIN: CPT | Performed by: NURSE PRACTITIONER

## 2023-06-21 RX ORDER — MONTELUKAST SODIUM 10 MG/1
TABLET ORAL
COMMUNITY
Start: 2022-04-21

## 2023-06-21 RX ORDER — TEMAZEPAM 30 MG/1
CAPSULE ORAL
COMMUNITY
Start: 2022-06-16

## 2023-06-21 RX ORDER — DOFETILIDE 0.5 MG/1
CAPSULE ORAL
COMMUNITY
Start: 2023-06-12

## 2023-06-21 RX ORDER — SILDENAFIL 50 MG/1
TABLET, FILM COATED ORAL
COMMUNITY
Start: 2023-06-02

## 2023-06-21 RX ORDER — BUPROPION HYDROCHLORIDE 300 MG/1
TABLET ORAL
COMMUNITY
Start: 2023-04-25 | End: 2023-06-21

## 2023-06-21 RX ORDER — TESTOSTERONE 10 MG/.5G
GEL, METERED TOPICAL
COMMUNITY
Start: 2015-09-14

## 2023-06-21 ASSESSMENT — PATIENT HEALTH QUESTIONNAIRE - PHQ9
SUM OF ALL RESPONSES TO PHQ QUESTIONS 1-9: 0
SUM OF ALL RESPONSES TO PHQ9 QUESTIONS 1 & 2: 0
2. FEELING DOWN, DEPRESSED OR HOPELESS: 0
SUM OF ALL RESPONSES TO PHQ QUESTIONS 1-9: 0
1. LITTLE INTEREST OR PLEASURE IN DOING THINGS: 0
SUM OF ALL RESPONSES TO PHQ QUESTIONS 1-9: 0
SUM OF ALL RESPONSES TO PHQ QUESTIONS 1-9: 0

## 2023-06-21 NOTE — PROGRESS NOTES
04/2022. Abdominal CT. Liver: Normal size and enhancement without focal mass or ductal dilation. OTHER TESTING:  Not available or performed    Follow up at the [facility] in 6 months for fibroscan.        POONAM Fernandez-LISBETH  [facility] [place] of [address], [address] [place] [address] Lisbet Schulz, [address] [place] [address]   [phone]

## (undated) DEVICE — VISUALIZATION SYSTEM: Brand: CLEARIFY

## (undated) DEVICE — SHEAR HARMONIC 5MMX45CM -- ACE 7+

## (undated) DEVICE — DEVON™ KNEE AND BODY STRAP 60" X 3" (1.5 M X 7.6 CM): Brand: DEVON

## (undated) DEVICE — Device

## (undated) DEVICE — FORCEPS BX OVL CUP SERR DISP CAP L 240CM RAD JAW 4

## (undated) DEVICE — TIP APPL L35CM RIG FOR SEAL EVICEL

## (undated) DEVICE — REM POLYHESIVE ADULT PATIENT RETURN ELECTRODE: Brand: VALLEYLAB

## (undated) DEVICE — PREP SKN PREVAIL 40ML APPL --

## (undated) DEVICE — SUT MONOCRYL PLUS UD 4-0 --

## (undated) DEVICE — NEEDLE INSUF L150MM DIA2MM DISP FOR PNEUMOPERI ENDOPATH

## (undated) DEVICE — TROCAR ENDOSCP L100MM DIA12MM STBL SL BLDELSS ENDOPATH XCEL

## (undated) DEVICE — DRAIN SURG 15FR L3/16IN SIL RND 3/4 FLUT 3/16IN TRCR

## (undated) DEVICE — SUTURE ETHLN SZ 3-0 L30IN NONABSORBABLE BLK FSL L30MM 3/8 1671H

## (undated) DEVICE — SEALANT HEMSTAT 5ML HUM FIBRIN THROM 2 VI APPL DEV EVICEL

## (undated) DEVICE — 3M™ STERI-STRIP™ REINFORCED ADHESIVE SKIN CLOSURES, R1548, 1 IN X 5 IN (25 MM X 125 MM), 4 STRIPS/ENVELOPE: Brand: 3M™ STERI-STRIP™

## (undated) DEVICE — SUTURE ETHIB EXCL BR GRN TAPR PT 2-0 30 X563H X563H

## (undated) DEVICE — SYR IRR CATH TIP LR ADPT 70ML -- CONVERT TO ITEM 363120

## (undated) DEVICE — SOL IRRIGATION INJ NACL 0.9% 500ML BTL

## (undated) DEVICE — STERILE POLYISOPRENE POWDER-FREE SURGICAL GLOVES: Brand: PROTEXIS

## (undated) DEVICE — 4-PORT MANIFOLD: Brand: NEPTUNE 2

## (undated) DEVICE — DRAIN SURG 7FR END PERF 1/8IN SIL RND SMOOTH HEAT POLISHED

## (undated) DEVICE — TROCAR ENDOSCP L100MM DIA15MM BLDELSS STBL SL ENDOPATH XCEL

## (undated) DEVICE — COVADERM PLUS: Brand: DEROYAL

## (undated) DEVICE — AGENT HEMSTAT W6XL9IN OXIDIZED REGENERATED CELOS ABSRB FOR

## (undated) DEVICE — SUTURE VCRL + SZ 2-0 L18IN ABSRB VLT CT-2 1/2 CIR TAPERCUT VCP726D

## (undated) DEVICE — GARMENT,MEDLINE,DVT,INT,CALF,MED, GEN2: Brand: MEDLINE

## (undated) DEVICE — STAPLER SKIN L440MM 32MM LNG 12 FIRING B FRM PWR + GRIPPING

## (undated) DEVICE — VISIGI 3D®  CALIBRATION SYSTEM  SIZE 36FR STD W/ BULB: Brand: BOEHRINGER® VISIGI 3D™ SLEEVE GASTRECTOMY CALIBRATION SYSTEM, SIZE 36FR W/BULB

## (undated) DEVICE — RELOAD STPL L60MM H2.3-4.2MM VERY THCK TISS BLK 6 ROW

## (undated) DEVICE — MAJ-1414 SINGLE USE ADPATER BIOPSY VALV: Brand: SINGLE USE ADAPTOR BIOPSY VALVE

## (undated) DEVICE — TROCAR LAP L100MM DIA5MM BLDELSS W/ STBL SL ENDOPATH XCEL

## (undated) DEVICE — BARIATRIC: Brand: MEDLINE INDUSTRIES, INC.

## (undated) DEVICE — TUBE KT ENDFLTN INSUFFLTN SVL --

## (undated) DEVICE — DERMABOND SKIN ADH 0.7ML --

## (undated) DEVICE — AMD ANTIMICROBIAL DRAIN SPONGES, 6 PLY, 0.2% POLYHEXAMETHYLENE BIGUANIDE HCI (PHMB): Brand: EXCILON

## (undated) DEVICE — SOLUTION LACTATED RINGERS INJECTION USP

## (undated) DEVICE — ENDO CARRY-ON PROCEDURE KIT INCLUDES ENZYMATIC SPONGE, GAUZE, BIOHAZARD LABEL, TRAY, LUBRICANT, DIRTY SCOPE LABEL, WATER LABEL, TRAY, DRAWSTRING PAD, AND DEFENDO 4-PIECE KIT.: Brand: ENDO CARRY-ON PROCEDURE KIT

## (undated) DEVICE — SUTURE PDS II SZ 0 L27IN ABSRB VLT L26MM CT-2 1/2 CIR Z334H

## (undated) DEVICE — DERMABOND SKIN ADH 0.7ML -- DERMABOND ADVANCED 12/BX

## (undated) DEVICE — BULB IRR SYR TY BASIN 50ML --

## (undated) DEVICE — TROCAR ENDOSCP BLDELSS 12X100 MM W/ HNDL STBL SL OPT TIP

## (undated) DEVICE — KENDALL SCD EXPRESS SLEEVES, KNEE LENGTH, MEDIUM: Brand: KENDALL SCD

## (undated) DEVICE — SUTURE PROL SZ 3-0 L18IN NONABSORBABLE BLU L19MM PC-5 3/8 8632G

## (undated) DEVICE — RESERVOIR,SUCTION,100CC,SILICONE: Brand: MEDLINE

## (undated) DEVICE — MEDI-VAC NON-CONDUCTIVE SUCTION TUBING: Brand: CARDINAL HEALTH

## (undated) DEVICE — E-Z CLEAN, PTFE COATED, ELECTROSURGICAL LAPAROSCOPIC ELECTRODE, L-HOOK, 33 CM., SINGLE-USE, FOR USE WITH HAND CONTROL PENCIL: Brand: MEGADYNE

## (undated) DEVICE — APPLIER CLP L SHFT DIA12MM 20 ROT MULT LIGACLP

## (undated) DEVICE — FLOSEAL HEMOSTATIC MATRIX, 5 ML: Brand: FLOSEAL

## (undated) DEVICE — 3.0MM PRECISION NEURO (MATCH HEAD)